# Patient Record
Sex: MALE | Race: WHITE | Employment: FULL TIME | URBAN - METROPOLITAN AREA
[De-identification: names, ages, dates, MRNs, and addresses within clinical notes are randomized per-mention and may not be internally consistent; named-entity substitution may affect disease eponyms.]

---

## 2017-01-18 ENCOUNTER — OFFICE VISIT (OUTPATIENT)
Dept: FAMILY MEDICINE CLINIC | Age: 27
End: 2017-01-18

## 2017-01-18 VITALS
RESPIRATION RATE: 18 BRPM | BODY MASS INDEX: 37.24 KG/M2 | WEIGHT: 281 LBS | TEMPERATURE: 98.1 F | DIASTOLIC BLOOD PRESSURE: 82 MMHG | HEART RATE: 96 BPM | HEIGHT: 73 IN | OXYGEN SATURATION: 98 % | SYSTOLIC BLOOD PRESSURE: 138 MMHG

## 2017-01-18 DIAGNOSIS — T78.40XA ALLERGIC REACTION, INITIAL ENCOUNTER: Primary | ICD-10-CM

## 2017-01-18 PROCEDURE — 99213 OFFICE O/P EST LOW 20 MIN: CPT | Performed by: FAMILY MEDICINE

## 2017-01-18 RX ORDER — METHYLPREDNISOLONE 4 MG/1
TABLET ORAL
COMMUNITY
Start: 2017-01-17 | End: 2019-03-05

## 2017-01-18 RX ORDER — FAMOTIDINE 20 MG/1
TABLET, FILM COATED ORAL
COMMUNITY
Start: 2017-01-17 | End: 2019-03-05

## 2017-01-22 ASSESSMENT — ENCOUNTER SYMPTOMS
GASTROINTESTINAL NEGATIVE: 1
EYES NEGATIVE: 1
SHORTNESS OF BREATH: 0
RESPIRATORY NEGATIVE: 1
ALLERGIC/IMMUNOLOGIC NEGATIVE: 1

## 2019-03-05 ENCOUNTER — OFFICE VISIT (OUTPATIENT)
Dept: INTERNAL MEDICINE CLINIC | Age: 29
End: 2019-03-05
Payer: COMMERCIAL

## 2019-03-05 ENCOUNTER — TELEPHONE (OUTPATIENT)
Dept: INTERNAL MEDICINE CLINIC | Age: 29
End: 2019-03-05

## 2019-03-05 VITALS
HEART RATE: 78 BPM | OXYGEN SATURATION: 99 % | TEMPERATURE: 97.9 F | BODY MASS INDEX: 31.18 KG/M2 | HEIGHT: 74 IN | DIASTOLIC BLOOD PRESSURE: 88 MMHG | WEIGHT: 243 LBS | SYSTOLIC BLOOD PRESSURE: 130 MMHG | RESPIRATION RATE: 16 BRPM

## 2019-03-05 DIAGNOSIS — J02.9 SORE THROAT: ICD-10-CM

## 2019-03-05 DIAGNOSIS — R10.84 GENERALIZED ABDOMINAL PAIN: Primary | ICD-10-CM

## 2019-03-05 DIAGNOSIS — H66.002 ACUTE SUPPURATIVE OTITIS MEDIA OF LEFT EAR WITHOUT SPONTANEOUS RUPTURE OF TYMPANIC MEMBRANE, RECURRENCE NOT SPECIFIED: Primary | ICD-10-CM

## 2019-03-05 DIAGNOSIS — R53.83 FATIGUE, UNSPECIFIED TYPE: ICD-10-CM

## 2019-03-05 LAB
HETEROPHILE ANTIBODIES: NORMAL
S PYO AG THROAT QL: NORMAL

## 2019-03-05 PROCEDURE — 86308 HETEROPHILE ANTIBODY SCREEN: CPT | Performed by: NURSE PRACTITIONER

## 2019-03-05 PROCEDURE — 99213 OFFICE O/P EST LOW 20 MIN: CPT | Performed by: NURSE PRACTITIONER

## 2019-03-05 PROCEDURE — 87880 STREP A ASSAY W/OPTIC: CPT | Performed by: NURSE PRACTITIONER

## 2019-03-05 RX ORDER — CEFDINIR 300 MG/1
300 CAPSULE ORAL 2 TIMES DAILY
Qty: 20 CAPSULE | Refills: 0 | Status: SHIPPED | OUTPATIENT
Start: 2019-03-05 | End: 2019-03-15

## 2019-03-05 RX ORDER — MOXIFLOXACIN HYDROCHLORIDE 400 MG/1
400 TABLET ORAL DAILY
Qty: 10 TABLET | Refills: 0 | Status: SHIPPED | OUTPATIENT
Start: 2019-03-05 | End: 2019-03-15

## 2019-03-05 ASSESSMENT — ENCOUNTER SYMPTOMS
TROUBLE SWALLOWING: 0
EYES NEGATIVE: 1
ABDOMINAL PAIN: 0
COUGH: 0
SINUS PRESSURE: 0
SORE THROAT: 1
SHORTNESS OF BREATH: 0
FACIAL SWELLING: 0
CHANGE IN BOWEL HABIT: 0
SWOLLEN GLANDS: 1
WHEEZING: 0
NAUSEA: 0
VISUAL CHANGE: 0
VOMITING: 0

## 2019-03-05 ASSESSMENT — PATIENT HEALTH QUESTIONNAIRE - PHQ9
SUM OF ALL RESPONSES TO PHQ QUESTIONS 1-9: 2
SUM OF ALL RESPONSES TO PHQ QUESTIONS 1-9: 2
2. FEELING DOWN, DEPRESSED OR HOPELESS: 1
1. LITTLE INTEREST OR PLEASURE IN DOING THINGS: 1
SUM OF ALL RESPONSES TO PHQ9 QUESTIONS 1 & 2: 2

## 2019-03-07 ENCOUNTER — TELEPHONE (OUTPATIENT)
Dept: INTERNAL MEDICINE CLINIC | Age: 29
End: 2019-03-07

## 2019-03-08 ENCOUNTER — TELEPHONE (OUTPATIENT)
Dept: INTERNAL MEDICINE CLINIC | Age: 29
End: 2019-03-08

## 2019-03-08 RX ORDER — CEFDINIR 300 MG/1
300 CAPSULE ORAL 2 TIMES DAILY
Qty: 20 CAPSULE | Refills: 0 | Status: SHIPPED | OUTPATIENT
Start: 2019-03-08 | End: 2019-03-18

## 2019-03-28 ENCOUNTER — OFFICE VISIT (OUTPATIENT)
Dept: INTERNAL MEDICINE CLINIC | Age: 29
End: 2019-03-28
Payer: COMMERCIAL

## 2019-03-28 VITALS
OXYGEN SATURATION: 99 % | BODY MASS INDEX: 30.81 KG/M2 | DIASTOLIC BLOOD PRESSURE: 78 MMHG | SYSTOLIC BLOOD PRESSURE: 128 MMHG | WEIGHT: 240 LBS | RESPIRATION RATE: 16 BRPM | TEMPERATURE: 99 F | HEART RATE: 79 BPM

## 2019-03-28 DIAGNOSIS — K64.9 HEMORRHOIDS, UNSPECIFIED HEMORRHOID TYPE: Primary | ICD-10-CM

## 2019-03-28 DIAGNOSIS — S46.912A STRAIN OF LEFT SHOULDER, INITIAL ENCOUNTER: ICD-10-CM

## 2019-03-28 DIAGNOSIS — G47.33 OSA (OBSTRUCTIVE SLEEP APNEA): ICD-10-CM

## 2019-03-28 PROCEDURE — 99213 OFFICE O/P EST LOW 20 MIN: CPT | Performed by: FAMILY MEDICINE

## 2019-03-28 ASSESSMENT — ENCOUNTER SYMPTOMS
EYES NEGATIVE: 1
ALLERGIC/IMMUNOLOGIC NEGATIVE: 1
SHORTNESS OF BREATH: 0
RESPIRATORY NEGATIVE: 1
GASTROINTESTINAL NEGATIVE: 1

## 2019-05-16 ENCOUNTER — OFFICE VISIT (OUTPATIENT)
Dept: GASTROENTEROLOGY | Age: 29
End: 2019-05-16
Payer: COMMERCIAL

## 2019-05-16 VITALS
HEIGHT: 74 IN | DIASTOLIC BLOOD PRESSURE: 70 MMHG | TEMPERATURE: 97.6 F | OXYGEN SATURATION: 98 % | WEIGHT: 246 LBS | SYSTOLIC BLOOD PRESSURE: 122 MMHG | HEART RATE: 84 BPM | BODY MASS INDEX: 31.57 KG/M2

## 2019-05-16 DIAGNOSIS — K59.1 FUNCTIONAL DIARRHEA: ICD-10-CM

## 2019-05-16 DIAGNOSIS — K92.9 FUNCTIONAL GASTROINTESTINAL DISTURBANCE: Primary | ICD-10-CM

## 2019-05-16 DIAGNOSIS — K64.8 OTHER HEMORRHOIDS: ICD-10-CM

## 2019-05-16 PROCEDURE — 99203 OFFICE O/P NEW LOW 30 MIN: CPT | Performed by: INTERNAL MEDICINE

## 2019-05-16 NOTE — PROGRESS NOTES
Gastroenterology Clinic Visit    Radha Shannon  [id number]  Chief Complaint   Patient presents with    Consultation     HPI: 34 y.o. male presents to the clinic with symptoms of abdominal discomfort, diarrhea mostly after eating at restaurants and food delivery. Patient has made significant changes to his diet which has improved his symptoms. Patient was seen by primary care physician with symptoms consistent with hemorrhoids, was prescribed Anusol with hydrocortisone which has improved his symptoms significantly. Patient denies any constipation. Patient also has been using probiotics which he started after he was treated with antibiotics for ear infection, he has noticed significant improvement in his symptoms after starting probiotics  Patient did have erratic food intake especially at college, patient finished his studies approximately a year ago and is working with a company dealing with medical supplies currently. Between the episodes when patient eating at home he does not have any significant abdominal pain or diarrhea. He does report to excessive stress and anxiety. He denies any depression or sleep issues  He quit smoking 5 years ago. He drinks on occasional basis. Review of Systems   All other systems reviewed and are negative. History reviewed. No pertinent past medical history. Past Surgical History:   Procedure Laterality Date    TONSILLECTOMY AND ADENOIDECTOMY       Current Outpatient Medications on File Prior to Visit   Medication Sig Dispense Refill    Probiotic Product (PROBIOTIC DAILY PO) Take 300 mg by mouth      hydrocortisone (ANUSOL-HC) 2.5 % rectal cream Place rectally 2 times daily. 1 Tube 3     No current facility-administered medications on file prior to visit.       Family History   Problem Relation Age of Onset    High Blood Pressure Father     Cancer Other         GM      Social History     Socioeconomic History    Marital status: Single     Spouse name: None  Number of children: None    Years of education: None    Highest education level: None   Occupational History    None   Social Needs    Financial resource strain: None    Food insecurity:     Worry: None     Inability: None    Transportation needs:     Medical: None     Non-medical: None   Tobacco Use    Smoking status: Never Smoker    Smokeless tobacco: Never Used   Substance and Sexual Activity    Alcohol use: No    Drug use: No    Sexual activity: None   Lifestyle    Physical activity:     Days per week: None     Minutes per session: None    Stress: None   Relationships    Social connections:     Talks on phone: None     Gets together: None     Attends Bahai service: None     Active member of club or organization: None     Attends meetings of clubs or organizations: None     Relationship status: None    Intimate partner violence:     Fear of current or ex partner: None     Emotionally abused: None     Physically abused: None     Forced sexual activity: None   Other Topics Concern    None   Social History Narrative    None       Blood pressure 122/70, pulse 84, temperature 97.6 °F (36.4 °C), height 6' 2\" (1.88 m), weight 246 lb (111.6 kg), SpO2 98 %. Physical Exam   Constitutional: He is oriented to person, place, and time. He appears well-developed and well-nourished. No distress. Eyes: No scleral icterus. Cardiovascular: Normal rate and regular rhythm. Pulmonary/Chest: Effort normal and breath sounds normal.   Abdominal: Soft. Normal appearance and bowel sounds are normal. He exhibits no distension, no ascites and no mass. There is no hepatosplenomegaly. There is no tenderness. There is no rebound, no guarding and no CVA tenderness. Musculoskeletal: Normal range of motion. Lymphadenopathy:     He has no cervical adenopathy. Neurological: He is alert and oriented to person, place, and time. Psychiatric: He has a normal mood and affect.  His behavior is normal. Judgment and thought content normal.     Laboratory, Pathology, Radiology reviewed indetail with relevant important investigations summarized below:  Lab Results   Component Value Date    WBC 7.9 06/07/2016    HGB 16.0 06/07/2016    HCT 46.5 06/07/2016    MCV 85.2 06/07/2016     06/07/2016     Lab Results   Component Value Date    ALT 24 06/07/2016    AST 19 06/07/2016    ALKPHOS 102 06/07/2016    BILITOT 0.5 06/07/2016     Endoscopic investigations: None    Assessmentand Plan:  34 y.o. male with clinical history consistent with functional gastrointestinal disturbance. Hemorrhoids responding well to Anusol with hydrocortisone  - Lifestyle modification with stress reduction, relaxation techniques discussed in detail.  - Dietary changes discussed  - Fiber supplementation, issue with fiber increasing gas and bloating discussed  - Hemorrhoid care discussed    Return if symptoms worsen or fail to improve. Lm Sadler MD   Staff Gastroenterologist  [facility]    Please note this report has been partially produced using speech recognition software and may cause contain errors related to thatsystem including grammar, punctuation and spelling as well as words and phrases that may seem inappropriate. If there are questions or concerns please feel free to contact me to clarify.

## 2019-05-30 ENCOUNTER — OFFICE VISIT (OUTPATIENT)
Dept: FAMILY MEDICINE CLINIC | Age: 29
End: 2019-05-30
Payer: COMMERCIAL

## 2019-05-30 VITALS
DIASTOLIC BLOOD PRESSURE: 80 MMHG | TEMPERATURE: 98.2 F | BODY MASS INDEX: 31.13 KG/M2 | RESPIRATION RATE: 16 BRPM | WEIGHT: 242.6 LBS | HEIGHT: 74 IN | HEART RATE: 60 BPM | SYSTOLIC BLOOD PRESSURE: 128 MMHG | OXYGEN SATURATION: 98 %

## 2019-05-30 DIAGNOSIS — R07.0 THROAT PAIN IN ADULT: ICD-10-CM

## 2019-05-30 DIAGNOSIS — Z11.59 NEED FOR HEPATITIS B SCREENING TEST: ICD-10-CM

## 2019-05-30 DIAGNOSIS — Z88.9 MULTIPLE ALLERGIES: Primary | ICD-10-CM

## 2019-05-30 DIAGNOSIS — Z11.4 SCREENING FOR HIV (HUMAN IMMUNODEFICIENCY VIRUS): ICD-10-CM

## 2019-05-30 LAB
HBV SURFACE AB TITR SER: REACTIVE MIU/ML
S PYO AG THROAT QL: NORMAL

## 2019-05-30 PROCEDURE — 99213 OFFICE O/P EST LOW 20 MIN: CPT | Performed by: PHYSICIAN ASSISTANT

## 2019-05-30 PROCEDURE — 87880 STREP A ASSAY W/OPTIC: CPT | Performed by: PHYSICIAN ASSISTANT

## 2019-05-30 RX ORDER — FLUTICASONE PROPIONATE 50 MCG
1 SPRAY, SUSPENSION (ML) NASAL DAILY
Qty: 1 BOTTLE | Refills: 0 | Status: SHIPPED | OUTPATIENT
Start: 2019-05-30 | End: 2020-09-28

## 2019-05-30 RX ORDER — AZITHROMYCIN 250 MG/1
250 TABLET, FILM COATED ORAL SEE ADMIN INSTRUCTIONS
Qty: 6 TABLET | Refills: 0 | Status: SHIPPED | OUTPATIENT
Start: 2019-05-30 | End: 2019-06-04

## 2019-05-30 ASSESSMENT — ENCOUNTER SYMPTOMS
SINUS PRESSURE: 1
BACK PAIN: 1
NAUSEA: 1
DIARRHEA: 1
CONSTIPATION: 0
EYE PAIN: 0
VOMITING: 0
SINUS PAIN: 1
WHEEZING: 0
SHORTNESS OF BREATH: 0
COUGH: 0
SORE THROAT: 0

## 2019-05-30 NOTE — PROGRESS NOTES
Subjective  Sri Kory, 34 y.o. male presents today with:  Chief Complaint   Patient presents with    Sinus Problem     Patient c/o sinus pain, runny nose, spots in the back of his throat nausea and fatigue x2 days. 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 Denies fevers, chills, cough  Has history of environmental and food allergies would like to see an allergist  His work requires proof of hep b immunity - only able to get two vaccines to to local reaction  Review of Systems   Constitutional: Positive for fatigue. HENT: Positive for sinus pressure and sinus pain. Negative for ear discharge and sore throat. Eyes: Negative for pain. Respiratory: Negative for cough, shortness of breath and wheezing. Gastrointestinal: Positive for diarrhea and nausea. Negative for constipation and vomiting. Genitourinary: Negative for dysuria. Musculoskeletal: Positive for back pain. Negative for neck pain. Skin: Negative for rash. Allergic/Immunologic: Negative for environmental allergies and food allergies. Neurological: Positive for headaches. Negative for dizziness. Psychiatric/Behavioral: Negative for sleep disturbance. History reviewed. No pertinent past medical history.   Past Surgical History:   Procedure Laterality Date    TONSILLECTOMY AND ADENOIDECTOMY       Social History     Socioeconomic History    Marital status: Single     Spouse name: Not on file    Number of children: Not on file    Years of education: Not on file    Highest education level: Not on file   Occupational History    Not on file   Social Needs    Financial resource strain: Not on file    Food insecurity:     Worry: Not on file     Inability: Not on file    Transportation needs:     Medical: Not on file     Non-medical: Not on file   Tobacco Use    Smoking status: Never Smoker    Smokeless tobacco: Never Used   Substance and Sexual Activity    Alcohol use: No    Drug use: No    Sexual activity: Not on file   Lifestyle    Physical activity:     Days per week: Not on file     Minutes per session: Not on file    Stress: Not on file   Relationships    Social connections:     Talks on phone: Not on file     Gets together: Not on file     Attends Buddhist service: Not on file     Active member of club or organization: Not on file     Attends meetings of clubs or organizations: Not on file     Relationship status: Not on file    Intimate partner violence:     Fear of current or ex partner: Not on file     Emotionally abused: Not on file     Physically abused: Not on file     Forced sexual activity: Not on file   Other Topics Concern    Not on file   Social History Narrative    Not on file     Family History   Problem Relation Age of Onset    High Blood Pressure Father     Cancer Other         GM        Allergies   Allergen Reactions    Amoxicillin Itching     pt finished 10 day course of Amoxicillin and proceeded to have itching to bilateral hands, arms, neck, and top of head without any visible rash    Tessalon [Benzonatate] Itching     Current Outpatient Medications   Medication Sig Dispense Refill    azithromycin (ZITHROMAX) 250 MG tablet Take 1 tablet by mouth See Admin Instructions for 5 days 500mg on day 1 followed by 250mg on days 2 - 5 6 tablet 0    Probiotic Product (PROBIOTIC DAILY PO) Take 300 mg by mouth      hydrocortisone (ANUSOL-HC) 2.5 % rectal cream Place rectally 2 times daily. 1 Tube 3     No current facility-administered medications for this visit.         Objective    Vitals:    05/30/19 1315   BP: 128/80   Site: Right Upper Arm   Position: Sitting   Cuff Size: Large Adult   Pulse: 60   Resp: 16   Temp: 98.2 °F (36.8 °C)   TempSrc: Oral   SpO2: 98%   Weight: 242 lb 9.6 oz (110 kg)   Height: 6' 2\" (1.88 m)     Physical Exam   Constitutional: He is oriented to person, place, and time. He appears well-developed and well-nourished. HENT:   Head: Normocephalic and atraumatic. Right Ear: A middle ear effusion is present. Left Ear: A middle ear effusion is present. Mouth/Throat: Oropharyngeal exudate present. Eyes: Pupils are equal, round, and reactive to light. Conjunctivae and EOM are normal.   Neck: Normal range of motion. Neck supple. Cardiovascular: Normal rate, regular rhythm and normal heart sounds. Pulmonary/Chest: Effort normal and breath sounds normal.   Lymphadenopathy:     He has no cervical adenopathy. Neurological: He is alert and oriented to person, place, and time. Skin: Skin is warm and dry. Psychiatric: His affect is blunt. Assessment & Plan    Diagnosis Orders   1. Multiple allergies  Amb External Referral To Allergy   2. Throat pain in adult  POCT rapid strep A    Throat Culture   3. Need for hepatitis B screening test     4. Screening for HIV (human immunodeficiency virus)           Orders Placed This Encounter   Procedures    Throat Culture     Standing Status:   Future     Standing Expiration Date:   5/30/2020    HIV-1,2 Combo Ag/Ab By TAMEKA, Reflexive Panel    Hepatitis B Surface Antibody    Amb External Referral To Allergy     Referral Priority:   Routine     Referral Type:   Consult for Advice and Opinion     Referral Reason:   Specialty Services Required     Requested Specialty:   Allergy     Number of Visits Requested:   1    POCT rapid strep A     Orders Placed This Encounter   Medications    azithromycin (ZITHROMAX) 250 MG tablet     Sig: Take 1 tablet by mouth See Admin Instructions for 5 days 500mg on day 1 followed by 250mg on days 2 - 5     Dispense:  6 tablet     Refill:  0     There are no discontinued medications. Return if symptoms worsen or fail to improve.     Latonia Chavez PA-C

## 2019-06-01 LAB
HIV 1,2 COMBO ANTIGEN/ANTIBODY: NEGATIVE
THROAT CULTURE: NORMAL

## 2020-01-16 LAB
ANION GAP SERPL CALCULATED.3IONS-SCNC: 12 MMOL/L (ref 10–20)
BICARBONATE: 30 MMOL/L (ref 21–32)
CHLORIDE BLD-SCNC: 103 MMOL/L (ref 98–107)
CREAT SERPL-MCNC: 1.06 MG/DL (ref 0.5–1.3)
GFR AFRICAN AMERICAN: >60 ML/MIN/1.73M2
GFR NON-AFRICAN AMERICAN: >60 ML/MIN/1.73M2
MAGNESIUM: 2.1 MG/DL (ref 1.6–2.4)
POTASSIUM SERPL-SCNC: 4.2 MMOL/L (ref 3.5–5.3)
SODIUM BLD-SCNC: 141 MMOL/L (ref 136–145)
T4 FREE: 0.9 NG/DL (ref 0.61–1.1)
TSH SERPL DL<=0.05 MIU/L-ACNC: 2.54 MIU/L (ref 0.44–3.9)
UREA NITROGEN: 13 MG/DL (ref 6–23)

## 2020-08-10 ENCOUNTER — APPOINTMENT (OUTPATIENT)
Dept: GENERAL RADIOLOGY | Age: 30
End: 2020-08-10
Payer: COMMERCIAL

## 2020-08-10 ENCOUNTER — HOSPITAL ENCOUNTER (EMERGENCY)
Age: 30
Discharge: HOME | End: 2020-08-10
Attending: EMERGENCY MEDICINE
Payer: COMMERCIAL

## 2020-08-10 VITALS
HEART RATE: 112 BPM | HEIGHT: 75 IN | TEMPERATURE: 99.7 F | BODY MASS INDEX: 32.33 KG/M2 | WEIGHT: 260 LBS | RESPIRATION RATE: 14 BRPM | SYSTOLIC BLOOD PRESSURE: 149 MMHG | OXYGEN SATURATION: 97 % | DIASTOLIC BLOOD PRESSURE: 94 MMHG

## 2020-08-10 PROCEDURE — 99284 EMERGENCY DEPT VISIT MOD MDM: CPT

## 2020-08-10 PROCEDURE — 73562 X-RAY EXAM OF KNEE 3: CPT

## 2020-08-10 ASSESSMENT — PAIN SCALES - GENERAL: PAINLEVEL_OUTOF10: 3

## 2020-08-10 ASSESSMENT — PAIN DESCRIPTION - LOCATION: LOCATION: KNEE

## 2020-08-10 ASSESSMENT — PAIN DESCRIPTION - ORIENTATION: ORIENTATION: RIGHT

## 2020-08-10 ASSESSMENT — PAIN DESCRIPTION - PAIN TYPE: TYPE: ACUTE PAIN

## 2020-08-10 ASSESSMENT — PAIN DESCRIPTION - DESCRIPTORS: DESCRIPTORS: ACHING

## 2020-08-10 ASSESSMENT — PAIN DESCRIPTION - FREQUENCY: FREQUENCY: CONTINUOUS

## 2020-08-11 NOTE — ED TRIAGE NOTES
Pt c/o right knee pain s/p playing softball. Pt states it hurts to bear weight on it. States he head a pop/crack. Pt alert and oriented x 4. Skin pink, warm, dry. Respirations even and unlabored. No distress noted at this time.

## 2020-08-11 NOTE — ED NOTES
Knee immobilizer applied to right knee, instructed patient on use. MSPs intact. RICE encouraged at home.      Tevin Avila RN  08/10/20 0948

## 2020-08-11 NOTE — ED PROVIDER NOTES
[facility] ED  EMERGENCY DEPARTMENT ENCOUNTER      Pt Name: Golden Noe  MRN: [id number]  [place] 1990  Date of evaluation: 8/10/2020  Provider: Lee Downing MD    [address] [place] [address]       Chief Complaint   Patient presents with    Knee Pain         HISTORY OF PRESENT ILLNESS   (Location/Symptom, Timing/Onset, Context/Setting, Quality, Duration, Modifying Factors, Severity)  Note limiting factors. 79-year-old male presenting with right knee pain. Patient was playing softball when he fell onto his knee. Noted a pop and was unable to walk afterwards. Has not taken anything for pain. Pain is localized to area just below the kneecap. No numbness or tingling. Nursing Notes were reviewed. REVIEW OF SYSTEMS    (2-9 systems for level 4, 10 or more for level 5)     Review of Systems   All other systems reviewed and are negative. Except as noted above the remainder of the review of systems was reviewed and negative. PAST MEDICAL HISTORY   No past medical history on file. SURGICAL HISTORY       Past Surgical History:   Procedure Laterality Date    TONSILLECTOMY AND ADENOIDECTOMY           CURRENT MEDICATIONS       Current Discharge Medication List      CONTINUE these medications which have NOT CHANGED    Details   fluticasone (FLONASE) 50 MCG/ACT nasal spray 1 spray by Each Nostril route daily  Qty: 1 Bottle, Refills: 0      Probiotic Product (PROBIOTIC DAILY PO) Take 300 mg by mouth      hydrocortisone (ANUSOL-HC) 2.5 % rectal cream Place rectally 2 times daily.   Qty: 1 Tube, Refills: 3             ALLERGIES     Amoxicillin and Tessalon [benzonatate]    FAMILY HISTORY       Family History   Problem Relation Age of Onset    High Blood Pressure Father     Cancer Other         GM          SOCIAL HISTORY       Social History     Socioeconomic History    Marital status: Single     Spouse name: Not on file    Number of children: Not on file    Years of education: Not on file  Highest education level: Not on file   Occupational History    Not on file   Social Needs    Financial resource strain: Not on file    Food insecurity     Worry: Not on file     Inability: Not on file    Transportation needs     Medical: Not on file     Non-medical: Not on file   Tobacco Use    Smoking status: Never Smoker    Smokeless tobacco: Never Used   Substance and Sexual Activity    Alcohol use: No    Drug use: No    Sexual activity: Not on file   Lifestyle    Physical activity     Days per week: Not on file     Minutes per session: Not on file    Stress: Not on file   Relationships    Social connections     Talks on phone: Not on file     Gets together: Not on file     Attends Sabianist service: Not on file     Active member of club or organization: Not on file     Attends meetings of clubs or organizations: Not on file     Relationship status: Not on file    Intimate partner violence     Fear of current or ex partner: Not on file     Emotionally abused: Not on file     Physically abused: Not on file     Forced sexual activity: Not on file   Other Topics Concern    Not on file   Social History Narrative    Not on file       SCREENINGS               PHYSICAL EXAM    (up to 7 for level 4, 8 or more for level 5)     ED Triage Vitals   BP Temp Temp Source Pulse Resp SpO2 Height Weight   08/10/20 2141 08/10/20 2140 08/10/20 2140 08/10/20 2141 08/10/20 2141 08/10/20 2140 08/10/20 2140 08/10/20 2140   (!) 149/94 99.7 °F (37.6 °C) Oral 112 14 97 % 6' 3\" (1.905 m) 260 lb (117.9 kg)       Physical Exam  Vitals signs and nursing note reviewed. Constitutional:       General: He is not in acute distress. Appearance: Normal appearance. He is well-developed. HENT:      Head: Normocephalic and atraumatic. Eyes:      Conjunctiva/sclera: Conjunctivae normal.   Neck:      Musculoskeletal: Normal range of motion and neck supple. Cardiovascular:      Rate and Rhythm: Normal rate and regular rhythm. softball game. X-ray shows no fracture. Suspect possible ligamentous injury. Given knee immobilizer and crutches. Recommend supportive care with ice, Tylenol and Motrin. Given orthopedic follow-up. Patient will be discharged home in good condition. Patient has been hemodynamically stable throughout ED course and is appropriate for outpatient follow up. Patient should follow up with ortho in 2-3 days or return to ED immediately for any new or worsening symptoms, non weight bearing until seen by ortho. Patient is well appearing on discharge and agreeable with plan of care. Patient Progress  Patient progress: stable      Procedures    CRITICAL CARE TIME   Total Critical Care time was 0 minutes, excluding separately reportable procedures. There was a high probability of clinically significant/life threatening deterioration in the patient's condition which required my urgent intervention. FINAL IMPRESSION      1.  Acute pain of right knee          DISPOSITION/PLAN   DISPOSITION Decision To Discharge 08/10/2020 10:44:45 PM      (Please note that portions of this note were completed with a voice recognition program.  Efforts were made to edit the dictations but occasionally words are mis-transcribed.)    Garcia Suárez MD (electronically signed)  Attending Emergency Physician        Garcia Suárez MD  08/10/20 7534

## 2020-08-25 ENCOUNTER — HOSPITAL ENCOUNTER (OUTPATIENT)
Dept: MRI IMAGING | Age: 30
Discharge: HOME | End: 2020-08-27
Payer: COMMERCIAL

## 2020-08-25 PROCEDURE — 73721 MRI JNT OF LWR EXTRE W/O DYE: CPT

## 2020-09-28 ENCOUNTER — HOSPITAL ENCOUNTER (OUTPATIENT)
Dept: PREADMISSION TESTING | Age: 30
Discharge: HOME | End: 2020-10-02
Payer: COMMERCIAL

## 2020-09-28 ENCOUNTER — NURSE ONLY (OUTPATIENT)
Dept: PRIMARY CARE CLINIC | Age: 30
End: 2020-09-28

## 2020-09-28 VITALS
RESPIRATION RATE: 16 BRPM | DIASTOLIC BLOOD PRESSURE: 76 MMHG | BODY MASS INDEX: 33.29 KG/M2 | WEIGHT: 259.4 LBS | HEART RATE: 77 BPM | TEMPERATURE: 98.4 F | SYSTOLIC BLOOD PRESSURE: 142 MMHG | OXYGEN SATURATION: 98 % | HEIGHT: 74 IN

## 2020-09-28 PROCEDURE — U0003 INFECTIOUS AGENT DETECTION BY NUCLEIC ACID (DNA OR RNA); SEVERE ACUTE RESPIRATORY SYNDROME CORONAVIRUS 2 (SARS-COV-2) (CORONAVIRUS DISEASE [COVID-19]), AMPLIFIED PROBE TECHNIQUE, MAKING USE OF HIGH THROUGHPUT TECHNOLOGIES AS DESCRIBED BY CMS-2020-01-R: HCPCS

## 2020-09-28 RX ORDER — SODIUM CHLORIDE, SODIUM LACTATE, POTASSIUM CHLORIDE, CALCIUM CHLORIDE 600; 310; 30; 20 MG/100ML; MG/100ML; MG/100ML; MG/100ML
INJECTION, SOLUTION INTRAVENOUS CONTINUOUS
Status: CANCELLED | OUTPATIENT
Start: 2020-10-02

## 2020-09-28 RX ORDER — SODIUM CHLORIDE 0.9 % (FLUSH) 0.9 %
10 SYRINGE (ML) INJECTION PRN
Status: CANCELLED | OUTPATIENT
Start: 2020-10-02

## 2020-09-28 RX ORDER — LIDOCAINE HYDROCHLORIDE 10 MG/ML
1 INJECTION, SOLUTION EPIDURAL; INFILTRATION; INTRACAUDAL; PERINEURAL
Status: CANCELLED | OUTPATIENT
Start: 2020-10-02 | End: 2020-10-02

## 2020-09-28 RX ORDER — SODIUM CHLORIDE 0.9 % (FLUSH) 0.9 %
10 SYRINGE (ML) INJECTION EVERY 12 HOURS SCHEDULED
Status: CANCELLED | OUTPATIENT
Start: 2020-10-02

## 2020-10-01 NOTE — H&P
[address]                      [facility], [address] [facility]                              HISTORY AND PHYSICAL    PATIENT NAME: Barbara Aguilar                     :        1990  MED REC NO:   [id number]                            ROOM:  ACCOUNT NO:   [de-identified]                           ADMIT DATE: 10/02/2020  PROVIDER:     Qasim Bustillos PA-C    FAMILY PROVIDER:  Dr. Nat Holly. CHIEF COMPLAINT:  Right knee pain and instability. HISTORY OF PRESENT ILLNESS:  The patient injured his right knee playing  softball, had immediate pain and swelling. Diagnostic studies show ACL  tendon tearing as well as medial meniscal tear. After risks, benefits,  and alternatives were discussed, the patient elects to proceed with  right knee arthroscopy ACL reconstruction. This will be performed on  10/02/2020 at [facility] in [facility]. PAST MEDICAL HISTORY:  Noncontributory. PAST SURGICAL HISTORY:  Significant for ear tubes as infant and wisdom  teeth extraction. He had no issues. ALLERGIES:  THE PATIENT DOES REPORT ALLERGY TO PENICILLIN, HAD  ANAPHYLAXIS. HE ALSO HAS A POULTRY ALLERGY. SOCIAL HISTORY:  The patient denies any substance abuse. Reports one or  two alcoholic beverages per month. Has a previous history of smoking,  but has quit. FAMILY HISTORY:  Noncontributory. REVIEW OF SYSTEMS:  Noncontributory. PHYSICAL EXAMINATION:  GENERAL:  A 51-year-old  male. Height 6 feet 3 inches, weight  260 pounds. EYES:  Pupils equal, round, react to light and accommodation. Extraocular eye movements are intact. He wears corrective lenses. CHEST:  Lungs are clear to auscultation bilaterally. CARDIAC:  Regular rate and rhythm. No murmurs, rubs, or gallops  appreciated. ABDOMEN:  Soft, nontender. Normoactive bowel sounds x4 quadrants. EXTREMITIES:  Right knee:  Positive effusion. Positive George. Positive drawer sign. Current range of motion is from 0 to 140 degrees. NEUROLOGIC:  CN II through XII grossly intact. ASSESSMENT:  Right knee ACL tear and medial meniscal tear. PLAN:  Right knee arthroscopy ACL reconstruction, possible medial  meniscectomy versus meniscal repair. Surgery will be on 10/02/2020 at  [facility] in [facility].         Rory Ortiz    D: 10/01/2020 10:37:59       T: 10/01/2020 10:46:16     [email or web address]  Job#: [id number]     Doc#: [id number]    CC:

## 2020-10-02 ENCOUNTER — ANESTHESIA EVENT (OUTPATIENT)
Dept: OPERATING ROOM | Age: 30
End: 2020-10-02
Payer: COMMERCIAL

## 2020-10-02 ENCOUNTER — ANESTHESIA (OUTPATIENT)
Dept: OPERATING ROOM | Age: 30
End: 2020-10-02
Payer: COMMERCIAL

## 2020-10-02 ENCOUNTER — HOSPITAL ENCOUNTER (OUTPATIENT)
Age: 30
Setting detail: OUTPATIENT SURGERY
Discharge: HOME | End: 2020-10-02
Attending: ORTHOPAEDIC SURGERY | Admitting: ORTHOPAEDIC SURGERY
Payer: COMMERCIAL

## 2020-10-02 VITALS
OXYGEN SATURATION: 97 % | BODY MASS INDEX: 33.24 KG/M2 | RESPIRATION RATE: 16 BRPM | TEMPERATURE: 97.8 F | WEIGHT: 259 LBS | HEART RATE: 78 BPM | SYSTOLIC BLOOD PRESSURE: 140 MMHG | DIASTOLIC BLOOD PRESSURE: 74 MMHG | HEIGHT: 74 IN

## 2020-10-02 VITALS — SYSTOLIC BLOOD PRESSURE: 107 MMHG | DIASTOLIC BLOOD PRESSURE: 54 MMHG | OXYGEN SATURATION: 94 %

## 2020-10-02 PROCEDURE — 7100000001 HC PACU RECOVERY - ADDTL 15 MIN: Performed by: ORTHOPAEDIC SURGERY

## 2020-10-02 PROCEDURE — 2709999900 HC NON-CHARGEABLE SUPPLY: Performed by: ORTHOPAEDIC SURGERY

## 2020-10-02 PROCEDURE — 2580000003 HC RX 258: Performed by: ORTHOPAEDIC SURGERY

## 2020-10-02 PROCEDURE — 6360000002 HC RX W HCPCS: Performed by: ANESTHESIOLOGY

## 2020-10-02 PROCEDURE — 7100000000 HC PACU RECOVERY - FIRST 15 MIN: Performed by: ORTHOPAEDIC SURGERY

## 2020-10-02 PROCEDURE — 7100000011 HC PHASE II RECOVERY - ADDTL 15 MIN: Performed by: ORTHOPAEDIC SURGERY

## 2020-10-02 PROCEDURE — C1776 JOINT DEVICE (IMPLANTABLE): HCPCS | Performed by: ORTHOPAEDIC SURGERY

## 2020-10-02 PROCEDURE — 3700000000 HC ANESTHESIA ATTENDED CARE: Performed by: ORTHOPAEDIC SURGERY

## 2020-10-02 PROCEDURE — 3600000004 HC SURGERY LEVEL 4 BASE: Performed by: ORTHOPAEDIC SURGERY

## 2020-10-02 PROCEDURE — 3600000014 HC SURGERY LEVEL 4 ADDTL 15MIN: Performed by: ORTHOPAEDIC SURGERY

## 2020-10-02 PROCEDURE — 6360000002 HC RX W HCPCS: Performed by: ORTHOPAEDIC SURGERY

## 2020-10-02 PROCEDURE — 7100000010 HC PHASE II RECOVERY - FIRST 15 MIN: Performed by: ORTHOPAEDIC SURGERY

## 2020-10-02 PROCEDURE — 2500000003 HC RX 250 WO HCPCS: Performed by: ORTHOPAEDIC SURGERY

## 2020-10-02 PROCEDURE — C1713 ANCHOR/SCREW BN/BN,TIS/BN: HCPCS | Performed by: ORTHOPAEDIC SURGERY

## 2020-10-02 PROCEDURE — 64447 NJX AA&/STRD FEMORAL NRV IMG: CPT | Performed by: ANESTHESIOLOGY

## 2020-10-02 PROCEDURE — 6370000000 HC RX 637 (ALT 250 FOR IP): Performed by: ANESTHESIOLOGY

## 2020-10-02 PROCEDURE — 2580000003 HC RX 258: Performed by: NURSE PRACTITIONER

## 2020-10-02 PROCEDURE — 2720000010 HC SURG SUPPLY STERILE: Performed by: ORTHOPAEDIC SURGERY

## 2020-10-02 PROCEDURE — 3700000001 HC ADD 15 MINUTES (ANESTHESIA): Performed by: ORTHOPAEDIC SURGERY

## 2020-10-02 DEVICE — SCREW INTFR L30MM DIA10MM VENT BIOCOMPOSITE: Type: IMPLANTABLE DEVICE | Site: KNEE | Status: FUNCTIONAL

## 2020-10-02 DEVICE — PIN FIX L50MM DIA5MM SFT TISS KNEE AMORPHOUS PLLA FOR ACL: Type: IMPLANTABLE DEVICE | Site: KNEE | Status: FUNCTIONAL

## 2020-10-02 RX ORDER — OXYCODONE HYDROCHLORIDE 5 MG/1
5 TABLET ORAL EVERY 4 HOURS PRN
Status: DISCONTINUED | OUTPATIENT
Start: 2020-10-02 | End: 2020-10-02 | Stop reason: HOSPADM

## 2020-10-02 RX ORDER — SODIUM CHLORIDE, SODIUM LACTATE, POTASSIUM CHLORIDE, CALCIUM CHLORIDE 600; 310; 30; 20 MG/100ML; MG/100ML; MG/100ML; MG/100ML
INJECTION, SOLUTION INTRAVENOUS CONTINUOUS
Status: DISCONTINUED | OUTPATIENT
Start: 2020-10-02 | End: 2020-10-02 | Stop reason: HOSPADM

## 2020-10-02 RX ORDER — LIDOCAINE HYDROCHLORIDE 10 MG/ML
1 INJECTION, SOLUTION EPIDURAL; INFILTRATION; INTRACAUDAL; PERINEURAL
Status: COMPLETED | OUTPATIENT
Start: 2020-10-02 | End: 2020-10-02

## 2020-10-02 RX ORDER — METOCLOPRAMIDE HYDROCHLORIDE 5 MG/ML
10 INJECTION INTRAMUSCULAR; INTRAVENOUS
Status: COMPLETED | OUTPATIENT
Start: 2020-10-02 | End: 2020-10-02

## 2020-10-02 RX ORDER — KETOROLAC TROMETHAMINE 30 MG/ML
INJECTION, SOLUTION INTRAMUSCULAR; INTRAVENOUS PRN
Status: DISCONTINUED | OUTPATIENT
Start: 2020-10-02 | End: 2020-10-02 | Stop reason: SDUPTHER

## 2020-10-02 RX ORDER — MORPHINE SULFATE 10 MG/ML
INJECTION, SOLUTION INTRAMUSCULAR; INTRAVENOUS PRN
Status: DISCONTINUED | OUTPATIENT
Start: 2020-10-02 | End: 2020-10-02 | Stop reason: ALTCHOICE

## 2020-10-02 RX ORDER — SODIUM CHLORIDE 0.9 % (FLUSH) 0.9 %
10 SYRINGE (ML) INJECTION PRN
Status: CANCELLED | OUTPATIENT
Start: 2020-10-02

## 2020-10-02 RX ORDER — MAGNESIUM HYDROXIDE 1200 MG/15ML
LIQUID ORAL CONTINUOUS PRN
Status: COMPLETED | OUTPATIENT
Start: 2020-10-02 | End: 2020-10-02

## 2020-10-02 RX ORDER — FENTANYL CITRATE 50 UG/ML
INJECTION, SOLUTION INTRAMUSCULAR; INTRAVENOUS PRN
Status: DISCONTINUED | OUTPATIENT
Start: 2020-10-02 | End: 2020-10-02 | Stop reason: SDUPTHER

## 2020-10-02 RX ORDER — DIPHENHYDRAMINE HYDROCHLORIDE 50 MG/ML
12.5 INJECTION INTRAMUSCULAR; INTRAVENOUS
Status: DISCONTINUED | OUTPATIENT
Start: 2020-10-02 | End: 2020-10-02 | Stop reason: HOSPADM

## 2020-10-02 RX ORDER — DEXAMETHASONE SODIUM PHOSPHATE 10 MG/ML
INJECTION INTRAMUSCULAR; INTRAVENOUS PRN
Status: DISCONTINUED | OUTPATIENT
Start: 2020-10-02 | End: 2020-10-02 | Stop reason: SDUPTHER

## 2020-10-02 RX ORDER — SODIUM CHLORIDE 0.9 % (FLUSH) 0.9 %
10 SYRINGE (ML) INJECTION EVERY 12 HOURS SCHEDULED
Status: CANCELLED | OUTPATIENT
Start: 2020-10-02

## 2020-10-02 RX ORDER — ROPIVACAINE HYDROCHLORIDE 5 MG/ML
INJECTION, SOLUTION EPIDURAL; INFILTRATION; PERINEURAL PRN
Status: DISCONTINUED | OUTPATIENT
Start: 2020-10-02 | End: 2020-10-02 | Stop reason: SDUPTHER

## 2020-10-02 RX ORDER — ONDANSETRON 2 MG/ML
INJECTION INTRAMUSCULAR; INTRAVENOUS PRN
Status: DISCONTINUED | OUTPATIENT
Start: 2020-10-02 | End: 2020-10-02 | Stop reason: SDUPTHER

## 2020-10-02 RX ORDER — ONDANSETRON 2 MG/ML
4 INJECTION INTRAMUSCULAR; INTRAVENOUS
Status: COMPLETED | OUTPATIENT
Start: 2020-10-02 | End: 2020-10-02

## 2020-10-02 RX ORDER — MEPERIDINE HYDROCHLORIDE 25 MG/ML
12.5 INJECTION INTRAMUSCULAR; INTRAVENOUS; SUBCUTANEOUS EVERY 5 MIN PRN
Status: DISCONTINUED | OUTPATIENT
Start: 2020-10-02 | End: 2020-10-02 | Stop reason: HOSPADM

## 2020-10-02 RX ORDER — MORPHINE SULFATE 2 MG/ML
2 INJECTION, SOLUTION INTRAMUSCULAR; INTRAVENOUS
Status: DISCONTINUED | OUTPATIENT
Start: 2020-10-02 | End: 2020-10-02 | Stop reason: HOSPADM

## 2020-10-02 RX ORDER — PROPOFOL 10 MG/ML
INJECTION, EMULSION INTRAVENOUS PRN
Status: DISCONTINUED | OUTPATIENT
Start: 2020-10-02 | End: 2020-10-02 | Stop reason: SDUPTHER

## 2020-10-02 RX ORDER — MIDAZOLAM HYDROCHLORIDE 1 MG/ML
INJECTION INTRAMUSCULAR; INTRAVENOUS PRN
Status: DISCONTINUED | OUTPATIENT
Start: 2020-10-02 | End: 2020-10-02 | Stop reason: SDUPTHER

## 2020-10-02 RX ORDER — HYDROCODONE BITARTRATE AND ACETAMINOPHEN 5; 325 MG/1; MG/1
2 TABLET ORAL PRN
Status: COMPLETED | OUTPATIENT
Start: 2020-10-02 | End: 2020-10-02

## 2020-10-02 RX ORDER — HYDROCODONE BITARTRATE AND ACETAMINOPHEN 5; 325 MG/1; MG/1
1 TABLET ORAL PRN
Status: COMPLETED | OUTPATIENT
Start: 2020-10-02 | End: 2020-10-02

## 2020-10-02 RX ORDER — FENTANYL CITRATE 50 UG/ML
50 INJECTION, SOLUTION INTRAMUSCULAR; INTRAVENOUS EVERY 10 MIN PRN
Status: DISCONTINUED | OUTPATIENT
Start: 2020-10-02 | End: 2020-10-02 | Stop reason: HOSPADM

## 2020-10-02 RX ORDER — SODIUM CHLORIDE 0.9 % (FLUSH) 0.9 %
10 SYRINGE (ML) INJECTION EVERY 12 HOURS SCHEDULED
Status: DISCONTINUED | OUTPATIENT
Start: 2020-10-02 | End: 2020-10-02 | Stop reason: HOSPADM

## 2020-10-02 RX ORDER — MORPHINE SULFATE 4 MG/ML
4 INJECTION, SOLUTION INTRAMUSCULAR; INTRAVENOUS
Status: DISCONTINUED | OUTPATIENT
Start: 2020-10-02 | End: 2020-10-02 | Stop reason: HOSPADM

## 2020-10-02 RX ORDER — BUPIVACAINE HYDROCHLORIDE 2.5 MG/ML
INJECTION, SOLUTION EPIDURAL; INFILTRATION; INTRACAUDAL PRN
Status: DISCONTINUED | OUTPATIENT
Start: 2020-10-02 | End: 2020-10-02 | Stop reason: ALTCHOICE

## 2020-10-02 RX ORDER — SODIUM CHLORIDE 0.9 % (FLUSH) 0.9 %
10 SYRINGE (ML) INJECTION PRN
Status: DISCONTINUED | OUTPATIENT
Start: 2020-10-02 | End: 2020-10-02 | Stop reason: HOSPADM

## 2020-10-02 RX ADMIN — FENTANYL CITRATE 50 MCG: 50 INJECTION, SOLUTION INTRAMUSCULAR; INTRAVENOUS at 07:33

## 2020-10-02 RX ADMIN — PROPOFOL 300 MG: 10 INJECTION, EMULSION INTRAVENOUS at 07:33

## 2020-10-02 RX ADMIN — MIDAZOLAM HYDROCHLORIDE 2 MG: 2 INJECTION, SOLUTION INTRAMUSCULAR; INTRAVENOUS at 07:06

## 2020-10-02 RX ADMIN — KETOROLAC TROMETHAMINE 30 MG: 30 INJECTION, SOLUTION INTRAMUSCULAR; INTRAVENOUS at 08:38

## 2020-10-02 RX ADMIN — METOCLOPRAMIDE HYDROCHLORIDE 10 MG: 5 INJECTION INTRAMUSCULAR; INTRAVENOUS at 09:30

## 2020-10-02 RX ADMIN — ROPIVACAINE HYDROCHLORIDE 30 ML: 5 INJECTION, SOLUTION EPIDURAL; INFILTRATION; PERINEURAL at 07:10

## 2020-10-02 RX ADMIN — LIDOCAINE HYDROCHLORIDE 0.1 ML: 10 INJECTION, SOLUTION EPIDURAL; INFILTRATION; INTRACAUDAL; PERINEURAL at 06:36

## 2020-10-02 RX ADMIN — ONDANSETRON 4 MG: 2 INJECTION INTRAMUSCULAR; INTRAVENOUS at 09:20

## 2020-10-02 RX ADMIN — HYDROCODONE BITARTRATE AND ACETAMINOPHEN 1 TABLET: 5; 325 TABLET ORAL at 10:17

## 2020-10-02 RX ADMIN — ONDANSETRON 4 MG: 2 INJECTION INTRAMUSCULAR; INTRAVENOUS at 07:35

## 2020-10-02 RX ADMIN — FENTANYL CITRATE 100 MCG: 50 INJECTION, SOLUTION INTRAMUSCULAR; INTRAVENOUS at 07:06

## 2020-10-02 RX ADMIN — KETOROLAC TROMETHAMINE 30 MG: 30 INJECTION, SOLUTION INTRAMUSCULAR; INTRAVENOUS at 08:30

## 2020-10-02 RX ADMIN — VANCOMYCIN HYDROCHLORIDE 1000 MG: 1 INJECTION, POWDER, LYOPHILIZED, FOR SOLUTION INTRAVENOUS at 06:41

## 2020-10-02 RX ADMIN — DEXAMETHASONE SODIUM PHOSPHATE 10 MG: 10 INJECTION INTRAMUSCULAR; INTRAVENOUS at 07:38

## 2020-10-02 RX ADMIN — SODIUM CHLORIDE, POTASSIUM CHLORIDE, SODIUM LACTATE AND CALCIUM CHLORIDE 1000 ML: 600; 310; 30; 20 INJECTION, SOLUTION INTRAVENOUS at 06:37

## 2020-10-02 ASSESSMENT — PULMONARY FUNCTION TESTS
PIF_VALUE: 14
PIF_VALUE: 14
PIF_VALUE: 13
PIF_VALUE: 14
PIF_VALUE: 13
PIF_VALUE: 14
PIF_VALUE: 11
PIF_VALUE: 13
PIF_VALUE: 13
PIF_VALUE: 1
PIF_VALUE: 12
PIF_VALUE: 13
PIF_VALUE: 8
PIF_VALUE: 13
PIF_VALUE: 11
PIF_VALUE: 8
PIF_VALUE: 11
PIF_VALUE: 13
PIF_VALUE: 11
PIF_VALUE: 11
PIF_VALUE: 13
PIF_VALUE: 13
PIF_VALUE: 2
PIF_VALUE: 13
PIF_VALUE: 13
PIF_VALUE: 2
PIF_VALUE: 13
PIF_VALUE: 12
PIF_VALUE: 8
PIF_VALUE: 13
PIF_VALUE: 8
PIF_VALUE: 13
PIF_VALUE: 14
PIF_VALUE: 13
PIF_VALUE: 13
PIF_VALUE: 11
PIF_VALUE: 4
PIF_VALUE: 11
PIF_VALUE: 12
PIF_VALUE: 13
PIF_VALUE: 21
PIF_VALUE: 13
PIF_VALUE: 13
PIF_VALUE: 11
PIF_VALUE: 13
PIF_VALUE: 11
PIF_VALUE: 14
PIF_VALUE: 11
PIF_VALUE: 13
PIF_VALUE: 14
PIF_VALUE: 7
PIF_VALUE: 13
PIF_VALUE: 12
PIF_VALUE: 13
PIF_VALUE: 12
PIF_VALUE: 13
PIF_VALUE: 0
PIF_VALUE: 13
PIF_VALUE: 13
PIF_VALUE: 14
PIF_VALUE: 12
PIF_VALUE: 12
PIF_VALUE: 11
PIF_VALUE: 1
PIF_VALUE: 13
PIF_VALUE: 13
PIF_VALUE: 8
PIF_VALUE: 13
PIF_VALUE: 8
PIF_VALUE: 1
PIF_VALUE: 19
PIF_VALUE: 13
PIF_VALUE: 12
PIF_VALUE: 13
PIF_VALUE: 13
PIF_VALUE: 14
PIF_VALUE: 11
PIF_VALUE: 11
PIF_VALUE: 13
PIF_VALUE: 12
PIF_VALUE: 8

## 2020-10-02 ASSESSMENT — PAIN SCALES - GENERAL
PAINLEVEL_OUTOF10: 2
PAINLEVEL_OUTOF10: 4
PAINLEVEL_OUTOF10: 5
PAINLEVEL_OUTOF10: 4

## 2020-10-02 ASSESSMENT — PAIN DESCRIPTION - DESCRIPTORS: DESCRIPTORS: ACHING

## 2020-10-02 ASSESSMENT — PAIN DESCRIPTION - LOCATION: LOCATION: KNEE

## 2020-10-02 ASSESSMENT — PAIN DESCRIPTION - PAIN TYPE: TYPE: SURGICAL PAIN

## 2020-10-02 NOTE — OP NOTE
[address]                      [address], [address] [facility]                                OPERATIVE REPORT    PATIENT NAME: Chelsi Condon                     :        1990  MED REC NO:   [id number]                            ROOM:  ACCOUNT NO:   [de-identified]                           ADMIT DATE: 10/02/2020  PROVIDER:     Anatoly Martinez MD    DATE OF PROCEDURE:  10/02/2020    PREOPERATIVE DIAGNOSES:  Right ACL tear, medial meniscus tear. POSTOPERATIVE DIAGNOSES:  Right ACL tear, medial meniscus tear, lateral  meniscus tear. OPERATIONS PERFORMED:  1. Arthroscopic partial, medial, and lateral meniscectomy. 2.  Arthroscopic ACL reconstruction with autogenous hamstring tendons. 3.  Harvesting of right knee autogenous hamstring tendons. SURGEON:  Anatoly Martinez MD    ASSISTANT:  Adrian Schultz PA-C was present throughout the entire case. Given the nature of the disease process and the procedure, a skilled  surgical first assistant was necessary during the case. The assistant  was necessary to hold retractors and manipulate the extremity during the  procedure. A certified scrub tech was at the back table managing the  instruments and supplies for the surgical case. ESTIMATED BLOOD LOSS:  Minimal.    FLUIDS:  Less than 3 L. ANESTHESIA:  General with a regional block. COMPLICATIONS:  None. INDICATIONS:  A 81-year-old male with knee pain, ACL tear, meniscus  tear, now for arthroscopy. SUMMATION OF EVENTS:  The patient was brought to the operating room. After induction of anesthesia, routine prep and drape. At this time,  exam with anesthesia showed a 3 to 4+ Lachman and Pivot shift. The  patient had stability of the collaterals. There was no rotatory  instability. There was no posterior dial test.  It was negative at 0,  30, and 60 degrees. There was no posterior instability.     Standard routine prep and drape, tourniquet was placed, but not inflated  and we harvested the hamstring tendons. The skin incision was made of 2  to 3 cm just medial to tibial tubercle. We identified the pes anserinus  and harvested bluntly the gracilis and semitendinosus tendons with a  tendon stripper bluntly. Hemostasis was obtained. The tendons were  taken to the back table, cleaned, and folded into a quadruple strand  graft that did nicely to a 9 mm tunnel. At this time, we began the arthroscopic portion of the case. Esmarch exsanguination was employed. Tourniquet was inflated. Portals  were established around the patellar tendon and we sequentially  inspected the knee. Patellofemoral joint was clean. Medial and lateral gutters were empty. Medial joint space and chondral surface was intact. There was a complex  tear of the posterior horn and medial meniscus as explained by the MRI  and partial meniscectomy comprising around 50% of the posterior horn of  the medial meniscus was carried out. Transition zones were created and  the majority of the meniscus was left intact. Just the posterior horn  was partially resected. The chondral surfaces were intact. In the ACL notch position, the ACL was completely devoured of its  attachment. There was a large wadded up piece of ACL or cyclops lesion  that was debrided and cleaned. The over the top position was inspected  and a small notchplasty was performed. In the figure-of-four position, there were similarly complex tear of the  posterior horn of the lateral meniscus just involving the inner rim  about 20% of the meniscus in the white zone, so a partial meniscectomy  was out at the posterior horn of lateral meniscus with transition zone  to the lateral size of the meniscus. The anterior portion and direct  lateral meniscus was left intact. There was good circumferential  meniscus tissue. The chondral surfaces were intact.   At this time, we  began the ACL reconstruction portion of the case.    Through the same skin incision, but a separate fascial plain, a guide  pin was inserted into the old ACL footprint from the tibial side. We  opened the tunnel to 9 mm and smoothened and cleaned it. Using a 7 mm over the top position, the graft was placed at around the  10 o'clock position on the right knee. A guide pin was resected. Tunnel was drilled 30 mm in length and 9 mm in diameter. Using the Arthrex outrigger system, a guide pin was then brought from  the lateral to medial across the condylar access of the femur through  the outrigger system and out the medial side of the knee. The lateral cortex was opened. A flexible wire was exchanged and the  wire was pulled through the knee joint out the tibial tunnel. The graft was placed over the flexible wire and the graft was easily  recessed to roughly 25 to 30 mm on the femoral side. Using the Arthrex Bio-cross pin, the pin was inserted from lateral to  medial locking the graft into place securing it under direct  observation. We then tensioned the graft in situ taking it through 30  ranges of motions at 35 pounds of pressure and at 35 degrees. We  inserted a 10 mm Bio-Composite Arthrex tibial screw with excellent  purchase. This allowed for photo-documentation. A hands-free showed  that there was negative Lachman and Pivoted full extension. Lavage  irrigation was completed. Photo-documentation of the graft was  completed. All wounds were closed with 2-0 and 4-0 Monocryl. Compressive dressing  was applied and Marcaine, Duramorph was inserted into the portal site  and tibial tunnel for comfort. The patient was transferred to recovery  room in a knee immobilizer.         Johnson Manrique MD    D: 10/02/2020 9:48:15       T: 10/02/2020 11:15:53     [email or web address]  Job#: [id number]     Doc#: [id number]    CC:

## 2020-10-02 NOTE — BRIEF OP NOTE
Brief Postoperative Note      Patient: Nirali Chavez  YOB: 1990  MRN: [id number]    Date of Procedure: 10/2/2020    Pre-Op Diagnosis: RIGHT: ACL TEAR - KNEE, MMT, LMT    Post-Op Diagnosis: Same       Procedure(s):  RIGHT: ARTHROSCOPY KNEE ANTERIOR CRUCIATE LIGAMENT RECONSTRUCTION AND POSSIBLE MEDIAL REPAIR. Surgeon(s):  Mike Rodriguez MD    Assistant:  Physician Assistant: Tiffanie Shanks PA-C    Anesthesia: General    Estimated Blood Loss (mL): Minimal    Complications: None    Specimens:   * No specimens in log *    Implants:  Implant Name Type Inv.  Item Serial No.  Lot No. LRB No. Used Action   IMPL KNEE BIO TRNSFX INTERFER ST [id number] Knee IMPL KNEE BIO TRNSFX INTERFER ST [id number]  [employer] [id number] Right 1 Implanted   SCREW BIOCOMP IF VENTED [id number] Knee SCREW BIOCOMP IF VENTED [id number]  [employer] [id number] Right 1 Implanted         Drains: * No LDAs found *    Findings: acl tear  lmt and mmt    Electronically signed by Mike Rodriguez MD on 10/2/2020 at 8:51 AM

## 2020-10-02 NOTE — ANESTHESIA POSTPROCEDURE EVALUATION
Department of Anesthesiology  Postprocedure Note    Patient: Nirali Chavez  MRN: [id number]  YOB: 1990  Date of evaluation: 10/2/2020  Time:  9:02 AM     Procedure Summary     Date:  10/02/20 Room / Location:  [address]    Anesthesia Start:  6402 Anesthesia Stop:      Procedure:  RIGHT: ARTHROSCOPY KNEE ANTERIOR CRUCIATE LIGAMENT RECONSTRUCTION AND POSSIBLE MEDIAL REPAIR. (Right ) Diagnosis:  (RIGHT: ACL TEAR - KNEE, MMT)    Surgeon:  Mike Rodriguez MD Responsible Provider:  Vicente Greenberg MD    Anesthesia Type:  general ASA Status:  2          Anesthesia Type: general    Taylor Phase I: Taylor Score: 10    Taylor Phase II:      Last vitals: Reviewed and per EMR flowsheets.        Anesthesia Post Evaluation    Patient location during evaluation: PACU  Patient participation: complete - patient participated  Level of consciousness: awake  Pain score: 0  Airway patency: patent  Nausea & Vomiting: no nausea  Complications: no  Cardiovascular status: blood pressure returned to baseline  Respiratory status: acceptable  Hydration status: euvolemic

## 2020-10-02 NOTE — PROGRESS NOTES
Pt assisted up to side of cart , states \"I feel slightly light headed\", encouraged to si on side of cart and use urinal, voided, rested back on cart. Pt states \"I feel better now. \" right leg dressing clean and dry, ice and immobilizer maintained. Right pedal pulse palpable. Right toes cool and mobile, pink in color.

## 2020-10-02 NOTE — PROGRESS NOTES
Received in short stay from pacu accompanied in by GABRIELA damico rn, Pt awake and alert. Breath sounds clear to anterior auscultation. SAO2  97% on room air. Right knee dressing clean and dry, ice and immobilizer on. Right pedal pulse palpable at 2+. Right toes slight cool and mobile, pt states \"slight numbness\" but also right knee \"is sore at \"5\" on pain scale.  States, \"I want to wait a minute on pain med \"

## 2020-10-02 NOTE — ANESTHESIA PROCEDURE NOTES
Peripheral Block    Patient location during procedure: pre-op  Staffing  Anesthesiologist: Blake Marsh MD  Performed: anesthesiologist   Preanesthetic Checklist  Completed: patient identified, site marked, surgical consent, pre-op evaluation, timeout performed, IV checked, risks and benefits discussed, monitors and equipment checked, anesthesia consent given, oxygen available and patient being monitored  Peripheral Block  Patient position: supine  Prep: ChloraPrep  Patient monitoring: cardiac monitor, continuous pulse ox, IV access and frequent blood pressure checks  Block type: Saphenous  Laterality: right  Injection technique: single-shot  Procedures: ultrasound guided  Local infiltration: ropivacaine  Infiltration strength: 0.5 %  Dose: 30 mL  Provider prep: mask and sterile gloves  Local infiltration: ropivacaine  Needle  Needle type: combined needle/nerve stimulator   Needle gauge: 21 G  Needle length: 10 cm  Needle localization: ultrasound guidance  Test dose: negative  Assessment  Injection assessment: negative aspiration for heme, no paresthesia on injection and local visualized surrounding nerve on ultrasound  Paresthesia pain: none  Slow fractionated injection: yes  Hemodynamics: stable  Reason for block: post-op pain management

## 2020-10-02 NOTE — PROGRESS NOTES
Discharge instructions reviewed with patient, patient verbalizes understanding. Pt states \"i'd like to rest a minute before I have to get dressed. \" Pt encourgaed to rest. More gingerale and crackers given to patient. Statement Selected

## 2020-10-02 NOTE — ANESTHESIA PRE PROCEDURE
Department of Anesthesiology  Preprocedure Note       Name:  Torie Scott   Age:  31 y.o.  :  1990                                          MRN:  [id number]         Date:  10/2/2020      Surgeon: Mia Camarena):  Cortez Girard MD    Procedure: Procedure(s):  RIGHT: ARTHROSCOPY KNEE ANTERIOR CRUCIATE LIGAMENT RECONSTRUCTION AND POSSIBLE MEDIAL REPAIR. ARTHREX; AUTOGRAFT LATEX ALLERGY    Medications prior to admission:   Prior to Admission medications    Medication Sig Start Date End Date Taking? Authorizing Provider   Probiotic Product (PROBIOTIC DAILY PO) Take 300 mg by mouth   Yes Historical Provider, MD   hydrocortisone (ANUSOL-HC) 2.5 % rectal cream Place rectally 2 times daily.  3/28/19   Zoe Ruiz MD       Current medications:    Current Facility-Administered Medications   Medication Dose Route Frequency Provider Last Rate Last Dose    lactated ringers infusion   Intravenous Continuous ADARSH Bolton  mL/hr at 10/02/20 0637 1,000 mL at 10/02/20 6739    sodium chloride flush 0.9 % injection 10 mL  10 mL Intravenous 2 times per day ADARSH Bolton CNP        sodium chloride flush 0.9 % injection 10 mL  10 mL Intravenous PRN ADARSH Bolton CNP        vancomycin 1000 mg IVPB in 250 mL D5W addavial  1,000 mg Intravenous Once Cortez Girard  mL/hr at 10/02/20 0641 1,000 mg at 10/02/20 0641    fentaNYL (SUBLIMAZE) injection 50 mcg  50 mcg Intravenous Q10 Min PRN Bianca Cooper MD        HYDROmorphone (DILAUDID) injection 0.5 mg  0.5 mg Intravenous Q10 Min PRN Bianca Cooper MD        HYDROcodone-acetaminophen [facility]) 5-325 MG per tablet 1 tablet  1 tablet Oral PRN Bianca Cooper MD        Or    HYDROcodone-acetaminophen [facility]) 5-325 MG per tablet 2 tablet  2 tablet Oral PRN Bianca Cooper MD        diphenhydrAMINE (BENADRYL) injection 12.5 mg  12.5 mg Intravenous Once PRN Bianca Cooper MD       [facility] ondansetron (ZOFRAN) injection 4 mg  4 mg Intravenous Once PRN [place] MD Leticia        metoclopramide [facility]) injection 10 mg  10 mg Intravenous Once PRN [place] MD Leticia        meperidine (DEMEROL) injection 12.5 mg  12.5 mg Intravenous Q5 Min PRN [place] MD Leticia           Allergies: Allergies   Allergen Reactions    Latex Itching     Added based on information entered during case entry, please review and add reactions, type, and severity as needed    Pcn [Penicillins] Anaphylaxis    Poultry Meal Anaphylaxis    Amoxicillin Itching     pt finished 10 day course of Amoxicillin and proceeded to have itching to bilateral hands, arms, neck, and top of head without any visible rash    Tessalon [Benzonatate] Itching       Problem List:  There is no problem list on file for this patient. Past Medical History:        Diagnosis Date    Heart palpitations        Past Surgical History:        Procedure Laterality Date    TONSILLECTOMY AND ADENOIDECTOMY      TYMPANOSTOMY TUBE PLACEMENT      WISDOM TOOTH EXTRACTION         Social History:    Social History     Tobacco Use    Smoking status: Former Smoker     Packs/day: 0.25     Types: Cigarettes     Last attempt to quit: 2015     Years since quittin.0    Smokeless tobacco: Never Used   Substance Use Topics    Alcohol use:  No                                Counseling given: Not Answered      Vital Signs (Current):   Vitals:    10/02/20 0611   BP: (!) 142/88   Pulse: 87   Resp: 20   Temp: 97.8 °F (36.6 °C)   TempSrc: Temporal   SpO2: 98%   Weight: 259 lb (117.5 kg)   Height: 6' 2\" (1.88 m)                                              BP Readings from Last 3 Encounters:   10/02/20 (!) 142/88   20 (!) 142/76   08/10/20 (!) 149/94       NPO Status: Time of last liquid consumption: 0000                        Time of last solid consumption: 0000                        Date of last liquid consumption: 10/02/20 Date of last solid food consumption: 10/02/20    BMI:   Wt Readings from Last 3 Encounters:   10/02/20 259 lb (117.5 kg)   09/28/20 259 lb 6.4 oz (117.7 kg)   08/10/20 260 lb (117.9 kg)     Body mass index is 33.25 kg/m². CBC:   Lab Results   Component Value Date    WBC 7.9 06/07/2016    RBC 5.45 06/07/2016    HGB 16.0 06/07/2016    HCT 46.5 06/07/2016    MCV 85.2 06/07/2016    RDW 13.0 06/07/2016     06/07/2016       CMP:   Lab Results   Component Value Date     01/16/2020    K 4.2 01/16/2020     01/16/2020    CO2 28 06/07/2016    BUN 12 06/07/2016    CREATININE 1.06 01/16/2020    GFRAA >60 01/16/2020    LABGLOM >60 01/16/2020    GLUCOSE 94 06/07/2016    PROT 7.6 06/07/2016    CALCIUM 10.0 06/07/2016    BILITOT 0.5 06/07/2016    ALKPHOS 102 06/07/2016    AST 19 06/07/2016    ALT 24 06/07/2016       POC Tests: No results for input(s): POCGLU, POCNA, POCK, POCCL, POCBUN, POCHEMO, POCHCT in the last 72 hours.     Coags: No results found for: PROTIME, INR, APTT    HCG (If Applicable): No results found for: PREGTESTUR, PREGSERUM, HCG, HCGQUANT     ABGs: No results found for: PHART, PO2ART, VXO3UXF, ZSF9EFL, BEART, X2BNNVKY     Type & Screen (If Applicable):  No results found for: LABABO, LABRH    Drug/Infectious Status (If Applicable):  No results found for: HIV, HEPCAB    COVID-19 Screening (If Applicable):   Lab Results   Component Value Date    COVID19 Not Detected 09/28/2020         Anesthesia Evaluation  Patient summary reviewed and Nursing notes reviewed no history of anesthetic complications:   Airway: Mallampati: II  TM distance: >3 FB   Neck ROM: full  Mouth opening: > = 3 FB Dental: normal exam         Pulmonary:Negative Pulmonary ROS and normal exam                               Cardiovascular:Negative CV ROS                      Neuro/Psych:   Negative Neuro/Psych ROS              GI/Hepatic/Renal: Neg GI/Hepatic/Renal ROS            Endo/Other: Negative Endo/Other ROS Pt had PAT visit. Abdominal:           Vascular: negative vascular ROS. Anesthesia Plan      general     ASA 2       Induction: intravenous. MIPS: Prophylactic antiemetics administered. Anesthetic plan and risks discussed with patient.         Attending anesthesiologist reviewed and agrees with Pre Eval content              Tahmina Mcmillan MD   10/2/2020

## 2020-10-09 ENCOUNTER — HOSPITAL ENCOUNTER (OUTPATIENT)
Dept: PHYSICAL THERAPY | Age: 30
Setting detail: THERAPIES SERIES
Discharge: HOME | End: 2020-10-09
Payer: COMMERCIAL

## 2020-10-09 PROCEDURE — 97110 THERAPEUTIC EXERCISES: CPT

## 2020-10-09 PROCEDURE — 97162 PT EVAL MOD COMPLEX 30 MIN: CPT

## 2020-10-09 PROCEDURE — 97016 VASOPNEUMATIC DEVICE THERAPY: CPT

## 2020-10-09 ASSESSMENT — PAIN DESCRIPTION - DESCRIPTORS: DESCRIPTORS: ACHING;SORE;SHARP

## 2020-10-09 ASSESSMENT — PAIN DESCRIPTION - ORIENTATION: ORIENTATION: RIGHT

## 2020-10-09 ASSESSMENT — PAIN DESCRIPTION - PAIN TYPE: TYPE: SURGICAL PAIN

## 2020-10-09 ASSESSMENT — PAIN SCALES - GENERAL: PAINLEVEL_OUTOF10: 0

## 2020-10-09 ASSESSMENT — PAIN DESCRIPTION - PROGRESSION: CLINICAL_PROGRESSION: GRADUALLY IMPROVING

## 2020-10-09 ASSESSMENT — PAIN - FUNCTIONAL ASSESSMENT: PAIN_FUNCTIONAL_ASSESSMENT: PREVENTS OR INTERFERES WITH ALL ACTIVE AND SOME PASSIVE ACTIVITIES

## 2020-10-09 ASSESSMENT — PAIN DESCRIPTION - FREQUENCY: FREQUENCY: CONTINUOUS

## 2020-10-09 ASSESSMENT — PAIN DESCRIPTION - LOCATION: LOCATION: KNEE

## 2020-10-09 ASSESSMENT — PAIN DESCRIPTION - ONSET: ONSET: SUDDEN

## 2020-10-09 NOTE — PROGRESS NOTES
[address]     Ph: [phone]  Fax: [phone]    [] Certification  [] Recertification [x]  Plan of Care  [] Progress Note [] Discharge      To:  Felipe Suárez      From:  Jermaine Dietrich, PT, DPT  Patient: Saurabh Méndez     : 1990  Diagnosis: ACL tear, MMT     Date: 10/9/2020  Treatment Diagnosis: decreased R knee AROM, decreased R LE strength, impaired gait and functional mobility, and decreased functional activity tolerance     Progress Report Period from:  10/9/2020  to 10/9/2020    Total # of Visits to Date: 1   No Show: 0    Canceled Appointment: 0     OBJECTIVE:   Short Term Goals - Time Frame for Short term goals: 4- 5 weeks    Goals Current/Discharge status  Met   Short term goal 1: The pt will report decreased R knee pain </= 2/10 at worst to increase ease with ADL's  8/10 at worst  [] yes  [x] no   Short term goal 2: The patient will ambulate >/=350 ft independently without AD in order to safely ambulate in the community  Bed mobility  Supine to Sit: Modified independent  Sit to Supine: Modified independent  Comment: slight increased efort to lift R LE though able to indep    Transfers  Sit to Stand: Modified independent  Stand to sit: Modified independent  Comment: requires adaptations d/t immobilizer    Ambulation 1  Surface: carpet  Device: Axillary Crutches  Other Apparatus: Knee Immobilizer(Right)  Assistance: Modified Independent  Quality of Gait: good reciprocal pattern with normal step length  Distance: within dept clincal distances [] yes  [] no     Long Term Goals - Time Frame for Long term goals : 10 weeks  Goals Current/ Discharge status Met   Long term goal 1: The pt will demo improved R LE strength 5/5 in order to safely ambulate unlimited distances without AD indep at PLOF Strength RLE  Comment: hip flex 4/5 ankle DF 5/5 knee 3/5   [] yes  [x] no   Long term goal 2: The pt will demo improved R knee AROM >/=0-130* in order to perform stairs recip indep at PLOF AROM RLE (degrees)  RLE General AROM: knee in supine -10-65; AAROM flexion 85     Stairs  # Steps : 4  Stairs Height: 6\"  Device: Crutches  Assistance: Modified independent   Comment: Vc's for sequencing with good carryover [] yes  [x] no   Long term goal 3: The pt will have a increase in LEFS score >/=10 points in order to increase functional activity tolerance 13/80 [] yes  [x] no   Long term goal 4: The pt will demo improved R SLS >/=30 seconds to increase ease with ambulation NT this date  [] yes  [x] no   Long term goal 5: The pt will be indep/compliant with HEP in order to self manage symptoms upon D/C ongoing [] yes  [x] no      Body structures, Functions, Activity limitations: Decreased functional mobility , Decreased ADL status, Decreased ROM, Decreased strength, Decreased balance, Increased pain, Decreased high-level IADLs, Decreased endurance  Assessment: Pt presents s/p R ACL reconstruction with HS graft and medial menisectomy 10/2/20 with decreased R knee AROM, decreased R Le strength, impaired gait and functional mobility, and decreased functional activity tolerance. These impairments currently limit his fucntional abilities to stand, ambulate, squat, perofrm ADL's, household duties, work duties, or stairs at [employer] without limitations.   Prognosis: Excellent  Discharge Recommendations: Continue to assess pending progress    PT Education: Goals;PT Role;Plan of Care;Home Exercise Program;Functional Mobility Training  Patient Education: stair training    PLAN: [x] Evaluate and Treat  Frequency/Duration:  Plan  Times per week: 1-2  Plan weeks: 10  Current Treatment Recommendations: Strengthening, ROM, Balance Training, Neuromuscular Re-education, Manual Therapy - Soft Tissue Mobilization, Manual Therapy - Joint Manipulation, Gait Training, Transfer Training, Stair training, Patient/Caregiver Education & Training, Safety Education & Training, Home Exercise Program, Aquatics, Equipment Evaluation, Education, & procurement, Modalities, Positioning  Plan Comment: start 2x per week decrease to 1 as able d/t visit limit     Precautions:Restrictions/Precautions: Weight Bearing Right Lower Extremity Weight Bearing: Weight Bearing As Tolerated      Other position/activity restrictions: WBAT with crutches and knee immobilizer x2 weeks, may remove for PT  Mod falls risk, follow protocol in chart                   Patient Status:[x] Continue/ Initiate plan of Care    [] Discharge PT. Recommend pt continue with HEP. [] Additional visits requested, Please re-certify for additional visits:        Signature: Electronically signed by Renae Cadena PT on 10/9/20 at 9:58 AM EDT    If you have any questions or concerns, please don't hesitate to call. Thank you for your referral.    I have reviewed this plan of care and certify a need for medically necessary rehabilitation services.     Physician Signature:__________________________________________________________  Date:  Please sign and return

## 2020-10-09 NOTE — PROGRESS NOTES
from home next week)  Type of occupation:   Leisure & Hobbies: softball, playing drums, working on cars  IADL Comments: current functional level </=20%     Subjective:  Subjective: Pt reports tearing R ACL and medial meniscus while playing softabll 8/10/20 and had surgical repair of ACL with HS graft and medial menisectomy 10/2/20. Pt also reports he strained his MCL at the time of injury. Pt reports he is currently not taking pain meds aside from OTC. Pt was provided SLR and heel slides.   Comments: RTD 10/21/20    Objective:   Ambulation 1  Surface: carpet  Device: Axillary Crutches  Other Apparatus: Knee Immobilizer(Right)  Assistance: Modified Independent  Quality of Gait: good reciprocal pattern with normal step length  Distance: within dept clincal distances  Stairs  # Steps : 4  Stairs Height: 6\"  Device: Crutches  Assistance: Modified independent   Comment: Vc's for sequencing with good carryover     Transfers  Sit to Stand: Modified independent  Stand to sit: Modified independent  Comment: requires adaptations d/t immobilizer    Strength RLE  Comment: hip flex 4/5 ankle DF 5/5 knee 3/5  Strength LLE  Comment: 5/5 grossly     AROM RLE (degrees)  RLE General AROM: knee in supine -10-65; AAROM flexion 85     AROM LLE (degrees)  LLE General AROM: knee 0-138 in supine    Observation/Palpation  Observation: increased ecchymosis noted throughout R HS, popliteal fossa, gastroc/soleus, R quad atrophy  Scar: x5 R anterior knee with scabbing covered with steri strips, scabbing noted throughout  Bed mobility  Supine to Sit: Modified independent  Sit to Supine: Modified independent  Comment: slight increased efort to lift R LE though able to indep    Additional Measures  Special Tests: NA d/t recent surgery     Exercises:   Exercises  Exercise 1: QS 5\"x10  Exercise 2: Heel slides with strap supine 5\"x10  Exercise 3: SLR with QS prior to lift x5  Exercise 4: gastroc stretch*  Exercise 5: Hip abd*  Exercise 6: HR/TR in immobilizer*  Exercise 7: ankle pumps*  Exercise 20: HEP: heel slides with strap, QS, SLR  Modalities:  Modalities  Cryotherapy (Minutes\Location): Int cold/compression x10 min to R knee supine with LE elevated for pain/edema control mid patella pre=44 cm post= NT   Manual:  Manual therapy  Joint mobilization: R patella*  PROM: R knee*  *Indicates exercise,modality, or manual techniques to be initiated when appropriate  Assessment: Body structures, Functions, Activity limitations: Decreased functional mobility , Decreased ADL status, Decreased ROM, Decreased strength, Decreased balance, Increased pain, Decreased high-level IADLs, Decreased endurance  Assessment: Pt presents s/p R ACL reconstruction with HS graft and medial menisectomy 10/2/20 with decreased R knee AROM, decreased R Le strength, impaired gait and functional mobility, and decreased functional activity tolerance. These impairments currently limit his fucntional abilities to stand, ambulate, squat, perofrm ADL's, household duties, work duties, or stairs at [facility] without limitations.   Prognosis: Excellent  Discharge Recommendations: Continue to assess pending progress  Activity Tolerance: Patient Tolerated treatment well     Decision Making: Medium Complexity  History: med  Exam: high; LEFS 13/80  Clinical Presentation: med    Plan  Frequency/Duration:  Plan  Times per week: 1-2  Plan weeks: 10  Current Treatment Recommendations: Strengthening, ROM, Balance Training, Neuromuscular Re-education, Manual Therapy - Soft Tissue Mobilization, Manual Therapy - Joint Manipulation, Gait Training, Transfer Training, Stair training, Patient/Caregiver Education & Training, Safety Education & Training, Home Exercise Program, Aquatics, Equipment Evaluation, Education, & procurement, Modalities, Positioning  Plan Comment: start 2x per week decrease to 1 as able d/t visit limit     Patient Education  New Education Provided: PT Education: Goals;PT Role;Plan of Care;Home Exercise Program;Functional Mobility Training  Patient Education: stair training    POST-PAIN     Pain Rating (0-10 pain scale):   0/10  Location and pain description same as pre-treatment unless indicated. Action: [] NA  [] Call Physician  [x] Perform HEP  [] Meds as prescribed    Evaluation and patient rights have been reviewed and patient agrees with plan of care. Yes  [x]  No  []   Explain:     Marks Fall Risk Assessment  Risk Factor Scale  Score   History of Falls [] Yes  [x] No 25  0    Secondary Diagnosis [] Yes  [x] No 15  0    Ambulatory Aid [] Furniture  [x] Crutches/cane/walker  [] None/bedrest/wheelchair/nurse 30  15  0 15   IV/Heparin Lock [] Yes  [x] No 20  0    Gait/Transferring [] Impaired  [x] Weak  [] Normal/bedrest/immobile 20  10  0 10   Mental Status [] Forgets limitations  [x] Oriented to own ability 15  0       Total: 25     Based on the Assessment score: check the appropriate box.   [x]  No intervention needed   Low =   Score of 0-24  []  Use standard prevention interventions Moderate =  Score of 24-44   [] Discuss fall prevention strategies   [] Indicate moderate falls risk on eval  []  Use high risk prevention interventions High = Score of 45 and higher   [] Discuss fall prevention strategies   [] Provide supervision during treatment time    Goals  Short term goals  Time Frame for Short term goals: 4- 5 weeks  Short term goal 1: The pt will report decreased R knee pain </= 2/10 at worst to increase ease with ADL's  Short term goal 2: The patient will ambulate >/=350 ft independently without AD in order to safely ambulate in the community  Long term goals  Time Frame for Long term goals : 10 weeks  Long term goal 1: The pt will demo improved R LE strength 5/5 in order to safely ambulate unlimited distances without AD indep at PLOF  Long term goal 2: The pt will demo improved R knee AROM >/=0-130* in order to perform stairs recip indep at PLOF  Long term goal 3: The pt will have a

## 2020-10-14 ENCOUNTER — HOSPITAL ENCOUNTER (OUTPATIENT)
Dept: PHYSICAL THERAPY | Age: 30
Setting detail: THERAPIES SERIES
Discharge: HOME | End: 2020-10-14
Payer: COMMERCIAL

## 2020-10-14 PROCEDURE — 97140 MANUAL THERAPY 1/> REGIONS: CPT

## 2020-10-14 PROCEDURE — 97110 THERAPEUTIC EXERCISES: CPT

## 2020-10-14 NOTE — PROGRESS NOTES
[id number] [address]  Outpatient Physical Therapy    Treatment Note        Date: 10/14/2020  Patient: Pilar Esparza  : 1990  ACCT #: [de-identified]  Referring Practitioner: Kateryna Palacios  Diagnosis: ACL tear, MMT    Visit Information:  PT Visit Information  Onset Date: 10/02/20  PT Insurance Information: [address]  Total # of Visits Approved: 20  Total # of Visits to Date: 2  No Show: 0  Canceled Appointment: 0  Progress Note Counter:  (HARD MAX at 20 visits)    Subjective: Pt reports he's starting to feel sensation around the incision now. Cont's to deny any pain. Pt states he feels like he's not getting a good quad contraction w/ QS. Comments: RTD 10/21/20  HEP Compliance:  [x] Good [] Fair [] Poor [] Reports not doing due to:    Vital Signs  Patient Currently in Pain: Denies   Pain Screening  Patient Currently in Pain: Denies    OBJECTIVE:   Exercises  Exercise 1: QS 5\"x15 w/ towel under ankle  Exercise 2: Heel slides with strap supine 5\"x15  Exercise 3: SLR with QS prior to lift x10  Exercise 4: gastroc stretch w/ strap 3x30'' RLE  Exercise 5: S/L hip abd x10  Exercise 6: HR/TR in immobilizer x10 ea (focus on equal WB'ing)  Exercise 7: ankle pumps w/ towel under  Exercise 20: HEP: gastroc stretch, hip abd, HR/TR, ankle pumps    Strength: [x] NT  [] MMT completed:    ROM: [] NT  [x] ROM measurements:  AROM RLE (degrees)  RLE General AROM: knee in supine -3-90 AAROM w/ strap for knee flexion     Manual:   Manual therapy  Joint mobilization: R patella mobs by therapist, then demo'd for pt for HEP    Modalities:  Modalities  Cryotherapy (Minutes\Location): Int cold/compression x10 min to R knee supine with LE elevated for pain/edema control mid patella pre=43.5 cm post=43.0 cm     *Indicates exercise, modality, or manual techniques to be initiated when appropriate    Assessment:    Body structures, Functions, Activity limitations: Decreased functional mobility , Decreased ADL status, Decreased ROM, Decreased strength, Decreased balance, Increased pain, Decreased high-level IADLs, Decreased endurance  Assessment: Initiated tx per POC/within protocol for inc ROM of R knee. Pt demo'ing good P/AAROM w/ knee flexion to 90 deg and knee ext at 3 deg. Pt states QS felt normal/improved technique w/ towel roll under heel. Pt also demo's good patellar mob strategy for HEP. Treatment Diagnosis: decreased R knee AROM, decreased R LE strength, impaired gait and functional mobility, and decreased functional activity tolerance  Prognosis: Excellent     Goals:  Short term goals  Time Frame for Short term goals: 4- 5 weeks  Short term goal 1: The pt will report decreased R knee pain </= 2/10 at worst to increase ease with ADL's  Short term goal 2: The patient will ambulate >/=350 ft independently without AD in order to safely ambulate in the community  Long term goals  Time Frame for Long term goals : 10 weeks  Long term goal 1: The pt will demo improved R LE strength 5/5 in order to safely ambulate unlimited distances without AD indep at PLOF  Long term goal 2: The pt will demo improved R knee AROM >/=0-130* in order to perform stairs recip indep at PLOF  Long term goal 3: The pt will have a increase in LEFS score >/=10 points in order to increase functional activity tolerance  Long term goal 4: The pt will demo improved R SLS >/=30 seconds to increase ease with ambulation  Long term goal 5: The pt will be indep/compliant with HEP in order to self manage symptoms upon D/C  Progress toward goals: inc ROM per protocol    POST-PAIN       Pain Rating (0-10 pain scale):  0 /10   Location and pain description same as pre-treatment unless indicated.    Action: [] NA   [x] Perform HEP  [] Meds as prescribed  [] Modalities as prescribed   [] Call Physician     Frequency/Duration:  Plan  Times per week: 1-2  Plan weeks: 10  Specific instructions for Next Treatment: Pt able to progress to week two of protocol 10/16/2020 ([place])  Current Treatment Recommendations: Strengthening, ROM, Balance Training, Neuromuscular Re-education, Manual Therapy - Soft Tissue Mobilization, Manual Therapy - Joint Manipulation, Gait Training, Transfer Training, Stair training, Patient/Caregiver Education & Training, Safety Education & Training, Home Exercise Program, Aquatics, Equipment Evaluation, Education, & procurement, Modalities, Positioning  Plan Comment: start 2x per week decrease to 1 as able d/t visit limit     Pt to continue current HEP. See objective section for any therapeutic exercise changes, additions or modifications this date.          PT Individual Minutes  Time In: 0492  Time Out: 1742  Minutes: 48  Timed Code Treatment Minutes: 38 Minutes  Procedure Minutes:10     Timed Activity Minutes Units   Ther Ex 33 2   Manual  5 1       Signature:  Electronically signed by Yamileth Acuna PTA on 10/14/20 at 4:56 PM EDT

## 2020-10-16 ENCOUNTER — HOSPITAL ENCOUNTER (OUTPATIENT)
Dept: PHYSICAL THERAPY | Age: 30
Setting detail: THERAPIES SERIES
Discharge: HOME | End: 2020-10-16
Payer: COMMERCIAL

## 2020-10-16 PROCEDURE — 97016 VASOPNEUMATIC DEVICE THERAPY: CPT

## 2020-10-16 PROCEDURE — 97110 THERAPEUTIC EXERCISES: CPT

## 2020-10-16 NOTE — PROGRESS NOTES
[id number] [address]  Outpatient Physical Therapy    Treatment Note        Date: 10/16/2020  Patient: Lauren   : 1990  ACCT #: [de-identified]  Referring Practitioner: Darrow Lennox  Diagnosis: ACL tear, MMT    Visit Information:  PT Visit Information  Onset Date: 10/02/20  PT Insurance Information: [employer]  Total # of Visits Approved: 20  Total # of Visits to Date: 3  No Show: 0  Canceled Appointment: 0  Progress Note Counter: 3/20 (HARD MAX at 20 visits)    Subjective: Pt denies any pain, states incision is looking dry. Comments: RTD 10/21/20  HEP Compliance:  [] Good [] Fair [] Poor [] Reports not doing due to:    Vital Signs  Patient Currently in Pain: Denies   Pain Screening  Patient Currently in Pain: Denies    OBJECTIVE:   Exercises  Exercise 1: QS 5\"x20 w/ towel under ankle  Exercise 2: Heel slides with strap supine 5\"x20  Exercise 3: SLR with QS prior to lift x12  Exercise 4: gastroc stretch w/ strap 3x30'' RLE  Exercise 5: S/L hip abd x12  Exercise 7: R hip add x12  Exercise 8: seated LAQ isometrics at 90 deg and 60 deg knee flex 5''x10 (pt using LLE for resistance)  Exercise 9: standing weight shifts at mat w/o brace lat and fwd/bkwd x20 ea  Exercise 10: wall squats (limited ROM ~30 deg, focus on equal WB'ing) x10  Exercise 19: ambulation around dept w/o knee immobilizer, using b/l crutches, supervision, mild-moderate R knee instability, no LOB, VC's for heel strike, toe off, and knee flex through R swing phase w/ good carryover. instructed pt to wear brace when walking lng distances or on uneven ground, otherwise at home can ambulate w/o. Exercise 20: HEP: hip add, wt shifts, multi-angle iso    Strength: [x] NT  [] MMT completed:    ROM: [] NT  [x] ROM measurements:  AROM RLE (degrees)  RLE General AROM: knee in supine 0-90 AAROM w/ strap for knee flexion     Modalities:  Modalities  Cryotherapy (Minutes\Location):  Int cold/compression x10 min to R knee supine with LE elevated for pain/edema control mid patella pre=42.5 cm post=40.5 cm     *Indicates exercise, modality, or manual techniques to be initiated when appropriate    Assessment: Body structures, Functions, Activity limitations: Decreased functional mobility , Decreased ADL status, Decreased ROM, Decreased strength, Decreased balance, Increased pain, Decreased high-level IADLs, Decreased endurance  Assessment: Cont tx per POC/within protocol for inc strength, ROM, and improved gait. Pt demo's good ROM meeting goal in protocol. Per orders, pt able to ambulate w/o knee immobilizer beginning today. Pt able to ambulate w/ mild-moderate R knee instability and gait deviations that improved over time w/ cues. Treatment Diagnosis: decreased R knee AROM, decreased R LE strength, impaired gait and functional mobility, and decreased functional activity tolerance  Prognosis: Excellent     Goals:  Short term goals  Time Frame for Short term goals: 4- 5 weeks  Short term goal 1: The pt will report decreased R knee pain </= 2/10 at worst to increase ease with ADL's  Short term goal 2: The patient will ambulate >/=350 ft independently without AD in order to safely ambulate in the community  Long term goals  Time Frame for Long term goals : 10 weeks  Long term goal 1: The pt will demo improved R LE strength 5/5 in order to safely ambulate unlimited distances without AD indep at PLOF  Long term goal 2: The pt will demo improved R knee AROM >/=0-130* in order to perform stairs recip indep at PLOF  Long term goal 3: The pt will have a increase in LEFS score >/=10 points in order to increase functional activity tolerance  Long term goal 4: The pt will demo improved R SLS >/=30 seconds to increase ease with ambulation  Long term goal 5:  The pt will be indep/compliant with HEP in order to self manage symptoms upon D/C  Progress toward goals: inc ROM, strength    POST-PAIN       Pain Rating (0-10 pain scale):   0/10   Location and pain description

## 2020-10-21 ENCOUNTER — HOSPITAL ENCOUNTER (OUTPATIENT)
Dept: PHYSICAL THERAPY | Age: 30
Setting detail: THERAPIES SERIES
Discharge: HOME | End: 2020-10-21
Payer: COMMERCIAL

## 2020-10-21 PROCEDURE — 97116 GAIT TRAINING THERAPY: CPT

## 2020-10-21 PROCEDURE — 97110 THERAPEUTIC EXERCISES: CPT

## 2020-10-21 PROCEDURE — 97016 VASOPNEUMATIC DEVICE THERAPY: CPT

## 2020-10-21 NOTE — PROGRESS NOTES
[id number] [address]  Outpatient Physical Therapy    Treatment Note        Date: 10/21/2020  Patient: Nirali Chavez  : 1990  ACCT #: [de-identified]  Referring Practitioner: Nat Lucero  Diagnosis: ACL tear, MMT    Visit Information:  PT Visit Information  Onset Date: 10/02/20  PT Insurance Information: [employer]  Total # of Visits Approved: 20  Total # of Visits to Date: 4  No Show: 0  Canceled Appointment: 0  Progress Note Counter:  (HARD MAX at 20 visits)    Subjective: Pt went to MD appt this AM and brought in new order stating \"wean off crutches, d/c immobilizer. wear short runner brace for next month, may remove for PT. Full ROM and wt bearing. Pt reports walking w/ 0-2 crutches w/ improving stability. Comments: RTD 20  HEP Compliance:  [x] Good [] Fair [] Poor [] Reports not doing due to:    Vital Signs  Patient Currently in Pain: Denies   Pain Screening  Patient Currently in Pain: Denies    OBJECTIVE:   Exercises  Exercise 2: Heel slides with strap [id number]  Exercise 3: SLR with QS prior to lift x20  Exercise 5: S/L hip abd x20  Exercise 6: prone hip ext x10  Exercise 7: R hip add x20  Exercise 9: standing weight shifts at mat w/o brace lat and fwd/bkwd x20 ea  Exercise 10: wall squats (limited ROM ~30 deg, focus on equal WB'ing) x10  Exercise 11: SLS w/ b/l fingertip support x20'', SLS w/ 1 fingertip support x20'', SLS w/o UE support x20''  Exercise 18: *brace donned for WB'ing activity*  Exercise 19: ambulation w/ brace and 1 crutch around dept w/ good stability and much improved heel strike/toe off and knee flexion observed. Also trialed gait w/o AD around dept w/ good stability, mild antalgia, and cont'd good heel strike/toe off/knee flexion.   Exercise 20: HEP: prone hip ext, SLS, wall squats    Strength: [x] NT  [] MMT completed:    ROM: [] NT  [] ROM measurements:  AROM RLE (degrees)  RLE General AROM: knee 0-108 deg supine     Modalities:  Modalities  Cryotherapy (Minutes\Location): Int cold/compression x10 min to R knee supine with LE elevated for pain/edema control mid patella pre=43.5 cm post=42.0 cm     *Indicates exercise, modality, or manual techniques to be initiated when appropriate    Assessment: Body structures, Functions, Activity limitations: Decreased functional mobility , Decreased ADL status, Decreased ROM, Decreased strength, Decreased balance, Increased pain, Decreased high-level IADLs, Decreased endurance  Assessment: Much education given to pt re: current status in regards to protocol and MD's new orders. Pt eager to wean from crutches, however has good understanding of protocol and desire to have good stability for prolonged distances, etc. Educated pt that he can use 1 crutch for longer community distances and trial w/o AD in home w/ knee brace donned w/ pt agreeable. Pt demo's improved AROM of R knee now that he has no ROM restriction. Will cont to gradually wean from AD and improve strength, ROM, and stability of R knee.   Treatment Diagnosis: decreased R knee AROM, decreased R LE strength, impaired gait and functional mobility, and decreased functional activity tolerance  Prognosis: Excellent     Goals:  Short term goals  Time Frame for Short term goals: 4- 5 weeks  Short term goal 1: The pt will report decreased R knee pain </= 2/10 at worst to increase ease with ADL's  Short term goal 2: The patient will ambulate >/=350 ft independently without AD in order to safely ambulate in the community  Long term goals  Time Frame for Long term goals : 10 weeks  Long term goal 1: The pt will demo improved R LE strength 5/5 in order to safely ambulate unlimited distances without AD indep at PLOF  Long term goal 2: The pt will demo improved R knee AROM >/=0-130* in order to perform stairs recip indep at PLOF  Long term goal 3: The pt will have a increase in LEFS score >/=10 points in order to increase functional activity tolerance  Long term goal 4: The pt will

## 2020-10-23 ENCOUNTER — HOSPITAL ENCOUNTER (OUTPATIENT)
Dept: PHYSICAL THERAPY | Age: 30
Setting detail: THERAPIES SERIES
Discharge: HOME | End: 2020-10-23
Payer: COMMERCIAL

## 2020-10-23 PROCEDURE — 97110 THERAPEUTIC EXERCISES: CPT

## 2020-10-23 PROCEDURE — 97016 VASOPNEUMATIC DEVICE THERAPY: CPT

## 2020-10-23 PROCEDURE — 97140 MANUAL THERAPY 1/> REGIONS: CPT

## 2020-10-23 NOTE — PROGRESS NOTES
[id number] [address]  Outpatient Physical Therapy    Treatment Note        Date: 10/23/2020  Patient: Beba Givens  : 1990  ACCT #: [de-identified]  Referring Practitioner: Rob Luis  Diagnosis: ACL tear, MMT    Visit Information:  PT Visit Information  Onset Date: 10/02/20  PT Insurance Information: [employer]  Total # of Visits Approved: 20  Total # of Visits to Date: 5  No Show: 0  Canceled Appointment: 0  Progress Note Counter:  (HARD MAX at 20 visits)    Subjective: Pt reports significant soreness specifically to Rt HS following LV. Pt also notes \"popping\" sensation in Rt knee w/ concentric portion of wall squats however is not painful. No pain currently however pt c/o inc sensitivity over incision upon wearing brace; pt using ramon pad to ease friction. Pt cont's to amb w/ 1 crutch however has started walking short distances at home w/o AD. Comments: RTD 20  HEP Compliance:  [x] Good [] Fair [] Poor [] Reports not doing due to:    Vital Signs  Patient Currently in Pain: Denies   Pain Screening  Patient Currently in Pain: Denies    OBJECTIVE:   Exercises  Exercise 2: Heel slides with strap [id number]''x10  Exercise 3: SLR with QS prior to lift x20  Exercise 5: S/L hip abd x20  Exercise 6: prone hip ext x10  Exercise 7: R hip add x20  Exercise 9: standing weight shifts at mat w/o brace lat and fwd/bkwd x20 ea  Exercise 10: wall squats (limited ROM ~30 deg, focus on equal WB'ing) x10  Exercise 11: SLS w/o UE support 3x30\"  Exercise 12: HS stretch 3x30\"  Exercise 13: B HR x10, B HR w/ Rt ecc focus x10  Exercise 18: *brace donned for WB'ing activity*  Exercise 19: ambulation w/ brace and 1 crutch around dept w/ good stability and much improved heel strike/toe off and knee flexion observed. Also trialed gait w/o AD around dept w/ good stability, mild antalgia, and cont'd good heel strike/toe off/knee flexion.   Exercise 20: HEP:     Strength: [x] NT  [] MMT completed:     ROM: [] NT  [x] ROM measurements:  AROM RLE (degrees)  RLE General AROM: knee 0-112 deg supine      Manual:   Manual therapy  PROM: Knee flexion PROM 4 min in supine    Modalities:  Modalities  Cryotherapy (Minutes\Location): Int cold/compression x10 min to R knee supine with LE elevated for pain/edema control mid patella pre=42.9 cm post=42.0 cm     *Indicates exercise, modality, or manual techniques to be initiated when appropriate    Assessment: Body structures, Functions, Activity limitations: Decreased functional mobility , Decreased ADL status, Decreased ROM, Decreased strength, Decreased balance, Increased pain, Decreased high-level IADLs, Decreased endurance  Assessment: Pt cont's to tolerate further progression of ROM and strengthening program according to sx protocol w/ addition of gentle HS stretch and standing HR's this date; new exs tolerated well. Pt completed wall slides w/o onset of pain or popping upon maintaining ~45 deg squat. Further improvement in Rt knee AROM achieved since LV following manual however cont's to have end range pain. Therapist edu pt on various techniques to improve desensitize of lm incisional region.   Treatment Diagnosis: decreased R knee AROM, decreased R LE strength, impaired gait and functional mobility, and decreased functional activity tolerance  Prognosis: Excellent     Goals:  Short term goals  Time Frame for Short term goals: 4- 5 weeks  Short term goal 1: The pt will report decreased R knee pain </= 2/10 at worst to increase ease with ADL's  Short term goal 2: The patient will ambulate >/=350 ft independently without AD in order to safely ambulate in the community    Long term goals  Time Frame for Long term goals : 10 weeks  Long term goal 1: The pt will demo improved R LE strength 5/5 in order to safely ambulate unlimited distances without AD indep at [facility]  Long term goal 2: The pt will demo improved R knee AROM >/=0-130* in order to perform stairs recip indep at [facility]  Long term goal 3: The pt will have a increase in LEFS score >/=10 points in order to increase functional activity tolerance  Long term goal 4: The pt will demo improved R SLS >/=30 seconds to increase ease with ambulation  Long term goal 5: The pt will be indep/compliant with HEP in order to self manage symptoms upon D/C  Progress toward goals: Rt LE strength     POST-PAIN       Pain Rating (0-10 pain scale):   0/10   Location and pain description same as pre-treatment unless indicated. Action: [x] NA   [] Perform HEP  [] Meds as prescribed  [] Modalities as prescribed   [] Call Physician     Frequency/Duration:  Plan  Times per week: 1-2  Plan weeks: 10  Current Treatment Recommendations: Strengthening, ROM, Balance Training, Neuromuscular Re-education, Manual Therapy - Soft Tissue Mobilization, Manual Therapy - Joint Manipulation, Gait Training, Transfer Training, Stair training, Patient/Caregiver Education & Training, Safety Education & Training, Home Exercise Program, Aquatics, Equipment Evaluation, Education, & procurement, Modalities, Positioning  Plan Comment: start 2x per week decrease to 1 as able d/t visit limit     Pt to continue current HEP. See objective section for any therapeutic exercise changes, additions or modifications this date.      PT Individual Minutes  Time In: 2205  Time Out: 0813  Minutes: 52  Timed Code Treatment Minutes: 42 Minutes  Procedure Minutes: 10 min (game ready)      Timed Activity Minutes Units   Ther Ex 38 3   Manual  4 0       Signature:  Electronically signed by Patricia De La Paz PTA on 10/23/20 at 9:42 AM EDT

## 2020-10-28 ENCOUNTER — HOSPITAL ENCOUNTER (OUTPATIENT)
Dept: PHYSICAL THERAPY | Age: 30
Setting detail: THERAPIES SERIES
Discharge: HOME | End: 2020-10-28
Payer: COMMERCIAL

## 2020-10-28 PROCEDURE — 97016 VASOPNEUMATIC DEVICE THERAPY: CPT

## 2020-10-28 PROCEDURE — 97110 THERAPEUTIC EXERCISES: CPT

## 2020-10-28 NOTE — PROGRESS NOTES
[address] [address]  Outpatient Physical Therapy    Treatment Note        Date: 10/28/2020  Patient: Salomón Cash  : 1990  ACCT #: [de-identified]  Referring Practitioner: Albania Miller  Diagnosis: ACL tear, MMT    Visit Information:  PT Visit Information  Onset Date: 10/02/20  PT Insurance Information: [address]  Total # of Visits Approved: 20  Total # of Visits to Date: 6  No Show: 0  Canceled Appointment: 0  Progress Note Counter:  (HARD MAX at 20 visits)    Subjective: Pt reports yesterday he was sitting in meetings for most of the day and he would get sharp shooting pain in R medial knee towards foot. Pt states it would happen w/ knee bent at 90 deg or even extended. Pt reports no instance of shocks today. Pt ambulating w/ 1 crutch still sometimes in the home when moving around a lot and out in community. Pt reports occ small buckling on R knee during R toe off in gait cycle. Pt reports inc calf tightness lately. Comments: RTD 20  HEP Compliance:  [x] Good [] Fair [] Poor [] Reports not doing due to:    Vital Signs  Patient Currently in Pain: Denies   Pain Screening  Patient Currently in Pain: Denies    OBJECTIVE:   Exercises  Exercise 1: Bike*  Exercise 2: Heel slides with strap supine 5''x10  Exercise 3: 4-way hip x20 ea  Exercise 4: gastroc stretch w/ strap 3x30'' RLE  Exercise 5: HS stretch 3x30\"  Exercise 6: SLS w/o UE support 3x30\"  Exercise 7: wall squats (limited ROM ~45 deg, focus on equal WB'ing) x10  Exercise 8: B HR x15, B HR w/ Rt ecc focus x15  Exercise 18: *brace donned for WB'ing activity*    Strength: [x] NT  [] MMT completed:     ROM: [] NT  [x] ROM measurements:  AROM RLE (degrees)  RLE General AROM: knee 0-114 deg supine; knee AAROM w/ strap 116 deg supine    Modalities:  Modalities  Cryotherapy (Minutes\Location):  Int cold/compression x10 min to R knee supine with LE elevated for pain/edema control mid patella pre=43.0 cm post=42.0 cm     *Indicates exercise, modality, or manual techniques to be initiated when appropriate    Assessment: Body structures, Functions, Activity limitations: Decreased functional mobility , Decreased ADL status, Decreased ROM, Decreased strength, Decreased balance, Increased pain, Decreased high-level IADLs, Decreased endurance  Assessment: Cont tx per POC for inc strength, rom, and improved gait. Pt denies any inc in pain throughout session or instances of shooting pain, though notes inc fatigue in R knee as tx progressed. Educated pt on next phase of protocol and discussed alter-G as potential for gait and eventually running when pt is able. Pt able to ambulate around dept w/o crutch w/ VC's for dec R pronation w/ good follow through. Treatment Diagnosis: decreased R knee AROM, decreased R LE strength, impaired gait and functional mobility, and decreased functional activity tolerance  Prognosis: Excellent     Goals:  Short term goals  Time Frame for Short term goals: 4- 5 weeks  Short term goal 1: The pt will report decreased R knee pain </= 2/10 at worst to increase ease with ADL's  Short term goal 2: The patient will ambulate >/=350 ft independently without AD in order to safely ambulate in the community  Long term goals  Time Frame for Long term goals : 10 weeks  Long term goal 1: The pt will demo improved R LE strength 5/5 in order to safely ambulate unlimited distances without AD indep at P[facility]  Long term goal 2: The pt will demo improved R knee AROM >/=0-130* in order to perform stairs recip indep at [facility]  Long term goal 3: The pt will have a increase in LEFS score >/=10 points in order to increase functional activity tolerance  Long term goal 4: The pt will demo improved R SLS >/=30 seconds to increase ease with ambulation  Long term goal 5:  The pt will be indep/compliant with HEP in order to self manage symptoms upon D/C  Progress toward goals: inc strength, rom, improve gait    POST-PAIN       Pain Rating (0-10 pain scale):   0/10 Location and pain description same as pre-treatment unless indicated. Action: [] NA   [x] Perform HEP  [] Meds as prescribed  [] Modalities as prescribed   [] Call Physician     Frequency/Duration:  Plan  Times per week: 1-2  Plan weeks: 10  Specific instructions for Next Treatment: Pt able to progress to week four of protocol 10/30/2020 ([place])  Current Treatment Recommendations: Strengthening, ROM, Balance Training, Neuromuscular Re-education, Manual Therapy - Soft Tissue Mobilization, Manual Therapy - Joint Manipulation, Gait Training, Transfer Training, Stair training, Patient/Caregiver Education & Training, Safety Education & Training, Home Exercise Program, Aquatics, Equipment Evaluation, Education, & procurement, Modalities, Positioning  Plan Comment: start 2x per week decrease to 1 as able d/t visit limit     Pt to continue current HEP. See objective section for any therapeutic exercise changes, additions or modifications this date.     PT Individual Minutes  Time In: 1470  Time Out: 1709  Minutes: 48  Timed Code Treatment Minutes: 38 Minutes  Procedure Minutes: 10     Timed Activity Minutes Units   Ther Ex 38 3       Signature:  Electronically signed by Sunita Ron PTA on 10/28/20 at 5:17 PM EDT

## 2020-10-30 ENCOUNTER — HOSPITAL ENCOUNTER (OUTPATIENT)
Dept: PHYSICAL THERAPY | Age: 30
Setting detail: THERAPIES SERIES
Discharge: HOME | End: 2020-10-30
Payer: COMMERCIAL

## 2020-10-30 PROCEDURE — 97016 VASOPNEUMATIC DEVICE THERAPY: CPT

## 2020-10-30 PROCEDURE — 97140 MANUAL THERAPY 1/> REGIONS: CPT

## 2020-10-30 PROCEDURE — 97110 THERAPEUTIC EXERCISES: CPT

## 2020-10-30 ASSESSMENT — PAIN DESCRIPTION - ORIENTATION: ORIENTATION: RIGHT

## 2020-10-30 ASSESSMENT — PAIN DESCRIPTION - DESCRIPTORS: DESCRIPTORS: SORE

## 2020-10-30 ASSESSMENT — PAIN SCALES - GENERAL: PAINLEVEL_OUTOF10: 1

## 2020-10-30 NOTE — PROGRESS NOTES
[id number] [address]  Outpatient Physical Therapy    Treatment Note        Date: 10/30/2020  Patient: Slick Carpenter  : 1990  ACCT #: [de-identified]  Referring Practitioner: Chani Andujar  Diagnosis: ACL tear, MMT    Visit Information:  PT Visit Information  Onset Date: 10/02/20  PT Insurance Information: [address]  Total # of Visits Approved: 20  Total # of Visits to Date: 7  No Show: 0  Canceled Appointment: 0  Progress Note Counter:  (HARD MAX at 20 visits)    Subjective: Pt presenting to appt reporting concerns w/ feeling a sudden \"pop\" in posterior knee/ medial hamstring region while performing toe up in standing following HR exercise. Pt expresses inc soreness along  HS since \"incident\" however pain has since eased and does not exceed 1-2/10. Pt reports doing desensitization massage at home states \"It seems to really be helping, Iit doesn't feel good but I can tell it's working. \"  Comments: RTD 20  HEP Compliance:  [x] Good [] Fair [] Poor [] Reports not doing due to:    Vital Signs  Patient Currently in Pain: Yes   Pain Screening  Patient Currently in Pain: Yes  Pain Assessment  Pain Assessment: 0-10  Pain Level: 1(1-2)  Pain Orientation: Right  Pain Descriptors: Sore    OBJECTIVE:   Exercises  Exercise 1: Bike, seat L5 5 min  Exercise 2: Heel slides with strap supine 5''x10  Exercise 3: 4-way hip x20 ea (consider progressing w/ ankle wt's NV)  Exercise 4: gastroc stretch w/ strap 3x30'' RLE  Exercise 5: HS stretch 3x30\"  Exercise 6: SLS w/o UE support 3x30\"  Exercise 7: wall squats (limited ROM ~45 deg, focus on equal WB'ing) x10  Exercise 8: B HR w/ Rt ecc focus 2x10  Exercise 9: TG full range squats w/ double leg eccentric 2x10 L9  Exercise 10: Isolated HS curls*  Exercise 18: *brace donned for WB'ing activity*    Strength: [x] NT  [] MMT completed:     ROM: [] NT  [x] ROM measurements:  AROM RLE (degrees)  RLE General AROM: knee 0-114 deg supine post tx      Manual:   Manual

## 2020-11-04 ENCOUNTER — HOSPITAL ENCOUNTER (OUTPATIENT)
Dept: PHYSICAL THERAPY | Age: 30
Setting detail: THERAPIES SERIES
Discharge: HOME | End: 2020-11-04
Payer: COMMERCIAL

## 2020-11-04 PROCEDURE — 97016 VASOPNEUMATIC DEVICE THERAPY: CPT

## 2020-11-04 PROCEDURE — 97110 THERAPEUTIC EXERCISES: CPT

## 2020-11-04 NOTE — PROGRESS NOTES
techniques to be initiated when appropriate    Assessment: Body structures, Functions, Activity limitations: Decreased functional mobility , Decreased ADL status, Decreased ROM, Decreased strength, Decreased balance, Increased pain, Decreased high-level IADLs, Decreased endurance  Assessment: Progressed therex this date per protocol for improved strength, rom, and improved gait. Initiated mod lynda stretch for dec quad tightness w/ pt reporting significant stretch felt w/o inc pain. Also added iso hold w/ wall squats w/ pt reporting inc fatigue and difficulty. Also initiated TM walking as pt felt he was not getting full knee ext during stance phase, however good gait quality observed, especially w/ use of mirror for biofeedback. Pt also observed to have less edema in R knee, evident w/ circumferential measurement much less than last tx prior to gameready. Treatment Diagnosis: decreased R knee AROM, decreased R LE strength, impaired gait and functional mobility, and decreased functional activity tolerance  Prognosis: Excellent     Goals:  Short term goals  Time Frame for Short term goals: 4- 5 weeks  Short term goal 1: The pt will report decreased R knee pain </= 2/10 at worst to increase ease with ADL's  Short term goal 2: The patient will ambulate >/=350 ft independently without AD in order to safely ambulate in the community    Long term goals  Time Frame for Long term goals : 10 weeks  Long term goal 1: The pt will demo improved R LE strength 5/5 in order to safely ambulate unlimited distances without AD indep at PLOF  Long term goal 2: The pt will demo improved R knee AROM >/=0-130* in order to perform stairs recip indep at PLOF  Long term goal 3: The pt will have a increase in LEFS score >/=10 points in order to increase functional activity tolerance  Long term goal 4: The pt will demo improved R SLS >/=30 seconds to increase ease with ambulation  Long term goal 5:  The pt will be indep/compliant with HEP in order to self manage symptoms upon D/C  Progress toward goals: inc strength, rom, improve gait    POST-PAIN       Pain Rating (0-10 pain scale):  0 /10   Location and pain description same as pre-treatment unless indicated. Action: [] NA   [] Perform HEP  [] Meds as prescribed  [] Modalities as prescribed   [] Call Physician     Frequency/Duration:  Plan  Times per week: 1-2  Plan weeks: 10  Current Treatment Recommendations: Strengthening, ROM, Balance Training, Neuromuscular Re-education, Manual Therapy - Soft Tissue Mobilization, Manual Therapy - Joint Manipulation, Gait Training, Transfer Training, Stair training, Patient/Caregiver Education & Training, Safety Education & Training, Home Exercise Program, Aquatics, Equipment Evaluation, Education, & procurement, Modalities, Positioning  Plan Comment: start 2x per week decrease to 1 as able d/t visit limit     Pt to continue current HEP. See objective section for any therapeutic exercise changes, additions or modifications this date.          PT Individual Minutes  Time In: 5255  Time Out: 7337  Minutes: 51  Timed Code Treatment Minutes: 41 Minutes  Procedure Minutes:10     Timed Activity Minutes Units   Ther Ex 41 3       Signature:  Electronically signed by Kelly Monroe PTA on 11/4/20 at 4:01 PM EST

## 2020-11-06 ENCOUNTER — HOSPITAL ENCOUNTER (OUTPATIENT)
Dept: PHYSICAL THERAPY | Age: 30
Setting detail: THERAPIES SERIES
Discharge: HOME | End: 2020-11-06
Payer: COMMERCIAL

## 2020-11-06 PROCEDURE — 97110 THERAPEUTIC EXERCISES: CPT

## 2020-11-06 NOTE — PROGRESS NOTES
[address] [address]  Outpatient Physical Therapy    Treatment Note        Date: 2020  Patient: Wei Vance  : 1990  ACCT #: [de-identified]  Referring Practitioner: Sheila Pierre  Diagnosis: ACL tear, MMT    Visit Information:  PT Visit Information  Onset Date: 10/02/20  PT Insurance Information: [address]  Total # of Visits Approved: 20  Total # of Visits to Date: 9  No Show: 0  Canceled Appointment: 0  Progress Note Counter:  (HARD MAX at 20 visits)    Subjective: Pt reports he feels he has contd difficulty fully straightening his knee with walking. Pt reports having intermittent shooting pains reaching 7-8/10 though are brief and occur on lateral LE/ITB, incision, and medial joint line. Comments: RTD 20  HEP Compliance:  [x] Good [] Fair [] Poor [] Reports not doing due to:    Vital Signs  Patient Currently in Pain: Denies   Pain Screening  Patient Currently in Pain: Denies    OBJECTIVE:   Exercises  Exercise 1: Bike, seat L5 5 min  Exercise 3: 4-way hip with RTB x10, R TKE RTB 5\"x15  Exercise 4: treadmill walking: fwd x5 mins 1. 3mph w/ focus on gait quality and equal weight shifting w/o UE support w/ mirror for biofeedback, etro x3 min 0.6 mph focus on TKE  Exercise 7: wall squats to about 80-90 deg 5'' hold x10  Exercise 9: TG full range squats w/ double leg eccentric 2x10 L9  Exercise 10: HS curls seated YTB x10  Exercise 11: small rockerboard 3 way x10 light- no UE assist  Exercise 12: step ups F/L 6\"x10  Exercise 13: lunges*  Exercise 14: SLS*  Exercise 20: HEP: 4 way hip, TKE, (verbally single leg HR vs dbl)    Strength: [] NT  [] MMT completed:  Strength RLE  Comment: SLR, knee flex, ext 4/5    ROM: [x] NT  [] ROM measurements:    Modalities: CP to R knee supine with Le elevated for post exertion pain control x10 mins     *Indicates exercise, modality, or manual techniques to be initiated when appropriate    Assessment:    Body structures, Functions, Activity limitations: Decreased functional mobility , Decreased ADL status, Decreased ROM, Decreased strength, Decreased balance, Increased pain, Decreased high-level IADLs, Decreased endurance  Assessment: Progressed wbing exercises for increased functional LE strength with focus on quad control for improved gait quality. Pt demos improving R knee strength through MMT though with contd limitations. All wbing exercises performed with brace doffed for improved LE strength, stability, and proprioception. Treatment Diagnosis: decreased R knee AROM, decreased R LE strength, impaired gait and functional mobility, and decreased functional activity tolerance  Prognosis: Excellent     Goals:  Short term goals  Time Frame for Short term goals: 4- 5 weeks  Short term goal 1: The pt will report decreased R knee pain </= 2/10 at worst to increase ease with ADL's  Short term goal 2: The patient will ambulate >/=350 ft independently without AD in order to safely ambulate in the community    Long term goals  Time Frame for Long term goals : 10 weeks  Long term goal 1: The pt will demo improved R LE strength 5/5 in order to safely ambulate unlimited distances without AD indep at PLOF  Long term goal 2: The pt will demo improved R knee AROM >/=0-130* in order to perform stairs recip indep at PLOF  Long term goal 3: The pt will have a increase in LEFS score >/=10 points in order to increase functional activity tolerance  Long term goal 4: The pt will demo improved R SLS >/=30 seconds to increase ease with ambulation  Long term goal 5: The pt will be indep/compliant with HEP in order to self manage symptoms upon D/C  Progress toward goals: good    POST-PAIN       Pain Rating (0-10 pain scale):  1 /10   Location and pain description same as pre-treatment unless indicated.    Action: [] NA   [x] Perform HEP  [] Meds as prescribed  [] Modalities as prescribed   [] Call Physician     Frequency/Duration:  Plan  Times per week: 1-2  Plan weeks: 10  Current Treatment Recommendations: Strengthening, ROM, Balance Training, Neuromuscular Re-education, Manual Therapy - Soft Tissue Mobilization, Manual Therapy - Joint Manipulation, Gait Training, Transfer Training, Stair training, Patient/Caregiver Education & Training, Safety Education & Training, Home Exercise Program, Aquatics, Equipment Evaluation, Education, & procurement, Modalities, Positioning  Plan Comment: start 2x per week decrease to 1 as able d/t visit limit     Pt to continue current HEP. See objective section for any therapeutic exercise changes, additions or modifications this date.     PT Individual Minutes  Time In: 0740  Time Out: 5247  Minutes: 52  Timed Code Treatment Minutes: 42 Minutes  Procedure Minutes: 10 CP     Timed Activity Minutes Units   Ther Ex 42 3       Signature:  Electronically signed by Myles Huddleston PT on 11/6/20 at 8:17 AM EST

## 2020-11-11 ENCOUNTER — HOSPITAL ENCOUNTER (OUTPATIENT)
Dept: PHYSICAL THERAPY | Age: 30
Setting detail: THERAPIES SERIES
Discharge: HOME | End: 2020-11-11
Payer: COMMERCIAL

## 2020-11-11 PROCEDURE — 97110 THERAPEUTIC EXERCISES: CPT

## 2020-11-11 PROCEDURE — 97140 MANUAL THERAPY 1/> REGIONS: CPT

## 2020-11-11 ASSESSMENT — PAIN DESCRIPTION - PAIN TYPE: TYPE: ACUTE PAIN

## 2020-11-11 ASSESSMENT — PAIN DESCRIPTION - LOCATION: LOCATION: KNEE

## 2020-11-11 ASSESSMENT — PAIN DESCRIPTION - DESCRIPTORS: DESCRIPTORS: CONSTANT

## 2020-11-11 ASSESSMENT — PAIN SCALES - GENERAL: PAINLEVEL_OUTOF10: 7

## 2020-11-11 ASSESSMENT — PAIN DESCRIPTION - ORIENTATION: ORIENTATION: RIGHT

## 2020-11-11 NOTE — PROGRESS NOTES
[address] [address]  Outpatient Physical Therapy    Treatment Note        Date: 2020  Patient: Abimbola Bell  : 1990  ACCT #: [de-identified]  Referring Practitioner: Lias Siu  Diagnosis: ACL tear, MMT    Visit Information:  PT Visit Information  Onset Date: 10/02/20  PT Insurance Information: [address]  Total # of Visits Approved: 20  Total # of Visits to Date: 10  No Show: 0  Canceled Appointment: 0  Progress Note Counter: 10/20 (HARD MAX at 20 visits)    Subjective: Pt reports soreness lasting 1 hour after LV. Pt states last night he was doing ant/post wt shifts and had sharp pain under the knee cap and is still risidual today. Pt reports cont'd brief sharp-shooting pains in medial knee.   Comments: RTD 20  HEP Compliance:  [x] Good [] Fair [] Poor [] Reports not doing due to:    Vital Signs  Patient Currently in Pain: Yes   Pain Screening  Patient Currently in Pain: Yes  Pain Assessment  Pain Assessment: 0-10  Pain Level: 7  Pain Type: Acute pain  Pain Location: Knee  Pain Orientation: Right  Pain Descriptors: Constant(\"it just hurts\")    OBJECTIVE:   Exercises  Exercise 1: Bike, seat L5 5 min  Exercise 3: 4-way hip with RTB x12, R TKE RTB 5\"x15 w/ minimal wt through RLE, then equal wt between b/l LE's 5''x15  Exercise 7: wall squats to about 90 deg 5'' hold x10  Exercise 11: large rockerboard 3 way x10 light- light fingertip support  Exercise 12: step ups F/L 6\"x12  Exercise 13: mini lunges fwd x10 b/l  Exercise 18: *brace doffed for WB'ing activity*    Strength: [x] NT  [] MMT completed:    ROM: [] NT  [x] ROM measurements:  AROM RLE (degrees)  RLE General AROM: knee 0-124     Manual:   Manual therapy  Joint mobilization: R patella mobs superior/inferior and lateral - no pain  Soft Tissue Mobalization: CFM to healed incision; demo'd for HEP  Other: total manual x8 mins    Modalities:  Modalities  Cryotherapy (Minutes\Location): D/C int compression d/t min changes and dec edema  Other: declined CP, will ice at home     *Indicates exercise, modality, or manual techniques to be initiated when appropriate    Assessment: Body structures, Functions, Activity limitations: Decreased functional mobility , Decreased ADL status, Decreased ROM, Decreased strength, Decreased balance, Increased pain, Decreased high-level IADLs, Decreased endurance  Assessment: Pt reporting no pain w/ knee mobs, however inc pain in ant knee w/ quad set or full knee ext in [address] While performing rockerboard, pt pointed to pain right under incision vs at direct ant knee, therefore demo'd CFM to healed incision w/ pt performing w/ good technique. Then upon standing and walking/performing full knee ext, pt stated it \"already felt a lot better. \" Initiated mini lunges fwd w/ good technique and no pain. Treatment Diagnosis: decreased R knee AROM, decreased R LE strength, impaired gait and functional mobility, and decreased functional activity tolerance  Prognosis: Excellent     Goals:  Short term goals  Time Frame for Short term goals: 4- 5 weeks  Short term goal 1: The pt will report decreased R knee pain </= 2/10 at worst to increase ease with ADL's  Short term goal 2: The patient will ambulate >/=350 ft independently without AD in order to safely ambulate in the community  Long term goals  Time Frame for Long term goals : 10 weeks  Long term goal 1: The pt will demo improved R LE strength 5/5 in order to safely ambulate unlimited distances without AD indep at PLOF  Long term goal 2: The pt will demo improved R knee AROM >/=0-130* in order to perform stairs recip indep at PLOF  Long term goal 3: The pt will have a increase in LEFS score >/=10 points in order to increase functional activity tolerance  Long term goal 4: The pt will demo improved R SLS >/=30 seconds to increase ease with ambulation  Long term goal 5:  The pt will be indep/compliant with HEP in order to self manage symptoms upon D/C  Progress toward goals: inc strength, rom, dec pain     POST-PAIN       Pain Rating (0-10 pain scale):   0-1/10   Location and pain description same as pre-treatment unless indicated. Action: [] NA   [x] Perform HEP  [] Meds as prescribed  [] Modalities as prescribed   [] Call Physician     Frequency/Duration:  Plan  Times per week: 1-2  Plan weeks: 10  Current Treatment Recommendations: Strengthening, ROM, Balance Training, Neuromuscular Re-education, Manual Therapy - Soft Tissue Mobilization, Manual Therapy - Joint Manipulation, Gait Training, Transfer Training, Stair training, Patient/Caregiver Education & Training, Safety Education & Training, Home Exercise Program, Aquatics, Equipment Evaluation, Education, & procurement, Modalities, Positioning  Plan Comment: start 2x per week decrease to 1 as able d/t visit limit     Pt to continue current HEP. See objective section for any therapeutic exercise changes, additions or modifications this date.          PT Individual Minutes  Time In: 0952  Time Out: 4171  Minutes: 42  Timed Code Treatment Minutes: 42 Minutes  Procedure Minutes:0     Timed Activity Minutes Units   Ther Ex 34 2   Manual  8 1       Signature:  Electronically signed by Gorge Masters PTA on 11/11/20 at 5:24 PM EST

## 2020-11-13 ENCOUNTER — HOSPITAL ENCOUNTER (OUTPATIENT)
Dept: PHYSICAL THERAPY | Age: 30
Setting detail: THERAPIES SERIES
Discharge: HOME | End: 2020-11-13
Payer: COMMERCIAL

## 2020-11-13 PROCEDURE — 97110 THERAPEUTIC EXERCISES: CPT

## 2020-11-13 NOTE — PROGRESS NOTES
[address] [address]  Outpatient Physical Therapy    Treatment Note        Date: 2020  Patient: Berkley Gurrola  : 1990  ACCT #: [de-identified]  Referring Practitioner: Dilcia Brown  Diagnosis: ACL tear, MMT    Visit Information:  PT Visit Information  Onset Date: 10/02/20  PT Insurance Information: [address]  Total # of Visits Approved: 20  Total # of Visits to Date: 11  No Show: 0  Canceled Appointment: 0  Progress Note Counter:  (HARD MAX at 20 visits)    Subjective: Pt presents to appt w/ no pain. Pt expressed significant changes and relief since incorporating CFM into daily routine. Comments: RTD 20  HEP Compliance:  [x] Good [] Fair [] Poor [] Reports not doing due to:    Vital Signs  Patient Currently in Pain: Denies   Pain Screening  Patient Currently in Pain: Denies    OBJECTIVE:   Exercises  Exercise 1: Bike, seat L5 5 min  Exercise 3: 4-way hip with RTB x12, R TKE RTB 5\"x15 w/ minimal wt through RLE, then equal wt between b/l LE's 5''x20  Exercise 7: wall squats to about 90 deg 5'' hold x10  Exercise 11: large rockerboard 3 way x15 ea  w/o support  Exercise 12: step ups F/L 6\"x12  Exercise 13: mini lunges fwd 2x10 b/l  Exercise 14: SLS 30\" w/o UE support (no LOB), 30\"x3 on foam w/o UE support (minimal sway)  Exercise 15:  Step downs on 4\" box  x10 Lt lead only this date  Exercise 18: *brace doffed for WB'ing activity*    Ambulation 1  Surface: carpet  Device: No Device  Other Apparatus: (w/o brace)  Assistance: Independent  Quality of Gait: good reciprocal pattern with normal step length  Distance: within dept clincal distances    Stairs  # Steps : 12  Stairs Height: 6\"  Device: No Device  Assistance: Independent  Comment: Pt completes w/ good eccentric control however requires to perform at slower than normal pace; mild quad discomfort/soreness    Strength: [] NT  [x] MMT completed:  Strength RLE  Comment: Knee:ext 4+/5, flex 5/5; Hip: 5/5 except ext 4+/5; ankle 5/5 ROM: [] NT  [x] ROM measurements:  AROM RLE (degrees)  RLE General AROM: knee 0-121 in supine post tx     Modalities:  Modalities  Cryotherapy (Minutes\Location): CP to Rt knee 10 min in seated for dec muscle soreness post exertion     *Indicates exercise, modality, or manual techniques to be initiated when appropriate    Assessment: Body structures, Functions, Activity limitations: Decreased functional mobility , Decreased ADL status, Decreased ROM, Decreased strength, Decreased balance, Increased pain, Decreased high-level IADLs, Decreased endurance  Assessment: Pt has shown great tolerance to continuous advancment of exercises according to protocol w/ muscle soreness recovering within 1-2 hrs post exertion. Pt has transitioned to [address] w/o brace without complaint. Pt approaching strength goals upon MMT w/ main area of weakness remaining in knee/hip ext. Further improvements in Lt knee ROM limited by \"tightness\" this date though denies incisional pain. Pt self reports 65% normal function at this time.   Treatment Diagnosis: decreased R knee AROM, decreased R LE strength, impaired gait and functional mobility, and decreased functional activity tolerance  Prognosis: Excellent     Goals:  Short term goals  Time Frame for Short term goals: 4- 5 weeks  Short term goal 1: The pt will report decreased R knee pain </= 2/10 at worst to increase ease with ADL's  Short term goal 2: The patient will ambulate >/=350 ft independently without AD in order to safely ambulate in the community  Long term goals  Time Frame for Long term goals : 10 weeks  Long term goal 1: The pt will demo improved R LE strength 5/5 in order to safely ambulate unlimited distances without AD indep at PLOF  Long term goal 2: The pt will demo improved R knee AROM >/=0-130* in order to perform stairs recip indep at PLOF  Long term goal 3: The pt will have improvement in LEFS score >/=70/80 in order to return to PLOF(updated 11/13/20)  Long term goal 4: The pt will demo improved R SLS >/=30 seconds to increase ease with ambulation  Long term goal 5: The pt will be indep/compliant with HEP in order to self manage symptoms upon D/C  Progress toward goals: Please refer to PN for details. POST-PAIN       Pain Rating (0-10 pain scale):   0/10   Location and pain description same as pre-treatment unless indicated. Action: [x] NA   [] Perform HEP  [] Meds as prescribed  [] Modalities as prescribed   [] Call Physician     Frequency/Duration:  Plan  Times per week: 1-2  Plan weeks: 10  Specific instructions for Next Treatment: Pt may begin phase 4 of protocol NV*  Current Treatment Recommendations: Strengthening, ROM, Balance Training, Neuromuscular Re-education, Manual Therapy - Soft Tissue Mobilization, Manual Therapy - Joint Manipulation, Gait Training, Transfer Training, Stair training, Patient/Caregiver Education & Training, Safety Education & Training, Home Exercise Program, Aquatics, Equipment Evaluation, Education, & procurement, Modalities, Positioning  Plan Comment: start 2x per week decrease to 1 as able d/t visit limit; PN completed this date for RTD 11/18/20     Pt to continue current HEP. See objective section for any therapeutic exercise changes, additions or modifications this date.      PT Individual Minutes  Time In: 0720  Time Out: 2188  Minutes: 50  Timed Code Treatment Minutes: 40 Minutes  Procedure Minutes: 10 min (CP)      Timed Activity Minutes Units   Ther Ex 40 3     Signature:  Electronically signed by Celine Boyd PTA on 11/13/20 at 10:37 AM EST

## 2020-11-13 NOTE — PROGRESS NOTES
Stephen robert, [address]     Ph: [phone]  Fax: [phone]    [] Certification  [] Recertification []  Plan of Care  [x] Progress Note [] Discharge      To:  Dilcia Brown      From:  Michael Holly, PT, DPT  Patient: Berkley Gurrola     : 1990  Diagnosis: ACL tear, MMT     Date: 2020  Treatment Diagnosis: decreased R knee AROM, decreased R LE strength, impaired gait and functional mobility, and decreased functional activity tolerance      Progress Report Period from:  10/9/2020  to 2020    Total # of Visits to Date: 11   No Show: 0    Canceled Appointment: 0     OBJECTIVE:   Short Term Goals - Time Frame for Short term goals: 4- 5 weeks    Goals Current/Discharge status  Met   Short term goal 1: The pt will report decreased R knee pain </= 2/10 at worst to increase ease with ADL's   Pt denies pain currently.  Pain ranges from 0-6/10 at most.  [x] yes  [x] no   Short term goal 2: The patient will ambulate >/=350 ft independently without AD in order to safely ambulate in the community  Ambulation 1  Surface: carpet  Device: No Device  Other Apparatus: (w/o brace)  Assistance: Independent  Quality of Gait: good reciprocal pattern with normal step length  Distance: within dept clincal distances [x] yes  [] no     Long Term Goals - Time Frame for Long term goals : 10 weeks  Goals Current/ Discharge status Met   Long term goal 1: The pt will demo improved R LE strength 5/5 in order to safely ambulate unlimited distances without AD indep at PLOF Strength RLE  Comment: Knee:ext 4+/5, flex 5/5; Hip: 5/5 except ext 4+/5; ankle 5/5    [x] yes  [x] no   Long term goal 2: The pt will demo improved R knee AROM >/=0-130* in order to perform stairs recip indep at PLOF AROM RLE (degrees)  RLE General AROM: knee 0-121 in supine post tx     Stairs  # Steps : 12  Stairs Height: 6\"  Device: No Device  Assistance: Independent  Comment: Pt completes w/ good eccentric control however requires to perform at slower than normal pace; mild quad discomfort/soreness [x] yes  [x] no   Long term goal 3: The pt will have a increase in LEFS score >/=10 points in order to increase functional activity tolerance   Updated: The pt will have improvement in LEFS score >/=70/80 in order to return to PLOF  LEFS=47/80 or 59% function; 65% function per verbal report  [x] yes  [x] no   Long term goal 4: The pt will demo improved R SLS >/=30 seconds to increase ease with ambulation Pt able to maintain good bal/stability 30\" on even surface and foam  [x] yes  [] no   Long term goal 5: The pt will be indep/compliant with HEP in order to self manage symptoms upon D/C Indep w/ ongoing  [x] yes  [x] no      Body structures, Functions, Activity limitations: Decreased functional mobility , Decreased ADL status, Decreased ROM, Decreased strength, Decreased balance, Increased pain, Decreased high-level IADLs, Decreased endurance  Assessment: Pt with good progress towards goals with contd deficits in functional R LE strength and knee ROM primarily limited by \"tightness. \"  Pt self reports 65% normal function at this time. Pt would cont to benefit from PT to further address remaining deficits and return to highest level of attainable function.    Specific instructions for Next Treatment: Pt may begin phase 4 of protocol NV*  Prognosis: Excellent  Discharge Recommendations: Continue to assess pending progress     PLAN:   Treatment Frquency/Duration:  Plan  Times per week: 1-2  Plan weeks: 10  Specific instructions for Next Treatment: Pt may begin phase 4 of protocol NV*  Current Treatment Recommendations: Strengthening, ROM, Balance Training, Neuromuscular Re-education, Manual Therapy - Soft Tissue Mobilization, Manual Therapy - Joint Manipulation, Gait Training, Transfer Training, Stair training, Patient/Caregiver Education & Training, Safety Education & Training, Home Exercise Program, Aquatics, Equipment Evaluation, Education, & procurement, Modalities, Positioning  Plan Comment: start 2x per week decrease to 1 as able d/t visit limit; PN completed this date for RTD 11/18/20     Precautions: Following progression per surgical protocol                      Patient Status:[x] Continue/ Initiate plan of Care    [] Discharge PT. Recommend pt continue with HEP. [] Additional visits requested, Please re-certify for additional visits:        Signature: Electronically signed by Slick Jang PT on 11/13/2020 at 11:00 AM  Electronically signed by Cynthia Espinoza PTA on 11/13/20 at 10:38 AM EST    If you have any questions or concerns, please don't hesitate to call. Thank you for your referral.    I have reviewed this plan of care and certify a need for medically necessary rehabilitation services.     Physician Signature:__________________________________________________________  Date:  Please sign and return

## 2020-11-18 ENCOUNTER — HOSPITAL ENCOUNTER (OUTPATIENT)
Dept: PHYSICAL THERAPY | Age: 30
Setting detail: THERAPIES SERIES
Discharge: HOME | End: 2020-11-18
Payer: COMMERCIAL

## 2020-11-18 PROCEDURE — 97110 THERAPEUTIC EXERCISES: CPT

## 2020-11-18 NOTE — PROGRESS NOTES
[address] [address]  Outpatient Physical Therapy    Treatment Note        Date: 2020  Patient: Ingrid Butler  : 1990  ACCT #: [de-identified]  Referring Practitioner: Radha Rodriguez  Diagnosis: ACL tear, MMT    Visit Information:  PT Visit Information  Onset Date: 10/02/20  PT Insurance Information: [address]  Total # of Visits Approved: 20  Total # of Visits to Date: 12  No Show: 0  Canceled Appointment: 0  Progress Note Counter:  (HARD MAX at 20 visits)    Subjective: Pt reports MD pleased w/ progress. Pt received new ACL brace that MD told pt to wear when active only. Pt reports no pain currently. Pt denies shooting-type pains the last few days. Pt states MD told him no sharp turns w/ running. Comments: RTD 2 months (2021)  HEP Compliance:  [x] Good [] Fair [] Poor [] Reports not doing due to:    Vital Signs  Patient Currently in Pain: Denies   Pain Screening  Patient Currently in Pain: Denies    OBJECTIVE:   Exercises  Exercise 1: Bike, seat L5 5 min  Exercise 3: 4-way hip with GTB x10, R TKE GTB 5''x15  Exercise 4: large rockerboard 4-way x15 ea w/o UE support  Exercise 5: TG Rt SL squat (w/ L toe down) x10  Exercise 6: step ups 8'' F/L x10 ea  Exercise 7: step downs 8''x10  Exercise 8: fwd/lat lunges x10 b/l  Exercise 9: mini-tramp jog dbl leg x20''; SL x20'', light jog x1 min  Exercise 10: alter-G fast walk w/ progression to jog w/ brace donned*  Exercise 18: *brace doffed for WB'ing activity*  Exercise 20: reviewed previous and current HEP w/ pt. Administered updated HEP w/ what ex's pt should now be focusing on: 4-way hip, TKE, step ups F/L, step downs, F/L lunges, SL heel raise, heel slides     Strength: [x] NT  [] MMT completed:     ROM: [] NT  [x] ROM measurements:  AROM RLE (degrees)  RLE General AROM: knee 0-125 in supine post tx    *Indicates exercise, modality, or manual techniques to be initiated when appropriate    Assessment:    Body structures, Functions, Activity limitations: Decreased functional mobility , Decreased ADL status, Decreased ROM, Decreased strength, Decreased balance, Increased pain, Decreased high-level IADLs, Decreased endurance  Assessment: progressed pt within 6-week protocol w/ good leonardo to new additions w/o c/o inc pain, only fatigue. Pt admits to nervousness on mini-tramp, though improved slightly w/ time. Pt also demo's increased R knee ROM this date. Discussed trialing alter-G NV w/ pt agreeable. Also discussed possibly decreasing frequency to 1x/week after NV d/t insurance limit w/ pt agreeable. Treatment Diagnosis: decreased R knee AROM, decreased R LE strength, impaired gait and functional mobility, and decreased functional activity tolerance  Prognosis: Excellent     Goals:  Short term goals  Time Frame for Short term goals: 4- 5 weeks  Short term goal 1: The pt will report decreased R knee pain </= 2/10 at worst to increase ease with ADL's  Short term goal 2: The patient will ambulate >/=350 ft independently without AD in order to safely ambulate in the community  Long term goals  Time Frame for Long term goals : 10 weeks  Long term goal 1: The pt will demo improved R LE strength 5/5 in order to safely ambulate unlimited distances without AD indep at PLOF  Long term goal 2: The pt will demo improved R knee AROM >/=0-130* in order to perform stairs recip indep at PLOF  Long term goal 3: The pt will have improvement in LEFS score >/=70/80 in order to return to PLOF(updated 11/13/20)  Long term goal 4: The pt will demo improved R SLS >/=30 seconds to increase ease with ambulation  Long term goal 5: The pt will be indep/compliant with HEP in order to self manage symptoms upon D/C  Progress toward goals: inc strength, rom, improve gait    POST-PAIN       Pain Rating (0-10 pain scale):   0/10   Location and pain description same as pre-treatment unless indicated.    Action: [] NA   [x] Perform HEP  [] Meds as prescribed  [] Modalities as prescribed   [] Call Physician     Frequency/Duration:  Plan  Times per week: 1-2  Plan weeks: 10  Current Treatment Recommendations: Strengthening, ROM, Balance Training, Neuromuscular Re-education, Manual Therapy - Soft Tissue Mobilization, Manual Therapy - Joint Manipulation, Gait Training, Transfer Training, Stair training, Patient/Caregiver Education & Training, Safety Education & Training, Home Exercise Program, Aquatics, Equipment Evaluation, Education, & procurement, Modalities, Positioning  Plan Comment: possibly decrease to 1x/week after NV d/t ins limit     Pt to continue current HEP. See objective section for any therapeutic exercise changes, additions or modifications this date.          PT Individual Minutes  Time In: 0337  Time Out: 6523  Minutes: 44  Timed Code Treatment Minutes: 44 Minutes  Procedure Minutes:0     Timed Activity Minutes Units   Ther Ex 44 3       Signature:  Electronically signed by Nataliia Goncalves PTA on 11/18/20 at 5:53 PM EST

## 2020-11-20 ENCOUNTER — HOSPITAL ENCOUNTER (OUTPATIENT)
Dept: PHYSICAL THERAPY | Age: 30
Setting detail: THERAPIES SERIES
Discharge: HOME | End: 2020-11-20
Payer: COMMERCIAL

## 2020-11-20 PROCEDURE — 97110 THERAPEUTIC EXERCISES: CPT

## 2020-11-20 NOTE — PROGRESS NOTES
[id number] [address]  Outpatient Physical Therapy    Treatment Note        Date: 2020  Patient: Abdiaziz Minaya  : 1990  ACCT #: [de-identified]  Referring Practitioner: Concepcion Steen  Diagnosis: ACL tear, MMT    Visit Information:  PT Visit Information  Onset Date: 10/02/20  PT Insurance Information: [address]  Total # of Visits Approved: 20  Total # of Visits to Date: 13  No Show: 0  Canceled Appointment: 0  Progress Note Counter:  (HARD MAX at 20 visits)    Subjective: no pain currently, my gait feel alot better, I was alittle sore from my new exercises  Comments: RTD 2 months (2021)  HEP Compliance:  [x] Good [] Fair [] Poor [] Reports not doing due to:    Vital Signs  Patient Currently in Pain: Denies   Pain Screening  Patient Currently in Pain: Denies    OBJECTIVE:   Exercises  Exercise 2: SLS w/ reach x 15  Exercise 3: 4-way hip with GTB x10, no UE support  Exercise 4: large rockerboard 4-way x15 ea w/o UE support  Exercise 8: BOSU lunges F/L 3 sec x 15  Exercise 9: mini-tramp jog dbl leg x20''; SL x20'', light jog x1 min  Exercise 10: Alter-G, 80%, various speeds up to 2.0mph, 8 min total       Strength: [] NT  [x] MMT completed:  Strength RLE  Comment: SLR 5/         ROM: [x] NT  [] ROM measurements:           *Indicates exercise, modality, or manual techniques to be initiated when appropriate    Assessment:         Body structures, Functions, Activity limitations: Decreased functional mobility , Decreased ADL status, Decreased ROM, Decreased strength, Decreased balance, Increased pain, Decreased high-level IADLs, Decreased endurance  Assessment: initiated walking in Alter-G at various speeds, w/ brace on, very very slight discomfort with RLE at full ext during gait, good gait pattern noted, added SLS w/ reach to improve LE strength, stability, good tolerance to all  Treatment Diagnosis: decreased R knee AROM, decreased R LE strength, impaired gait and functional mobility, and decreased functional activity tolerance  Prognosis: Excellent       Goals:  Short term goals  Time Frame for Short term goals: 4- 5 weeks  Short term goal 1: The pt will report decreased R knee pain </= 2/10 at worst to increase ease with ADL's  Short term goal 2: The patient will ambulate >/=350 ft independently without AD in order to safely ambulate in the community    Long term goals  Time Frame for Long term goals : 10 weeks  Long term goal 1: The pt will demo improved R LE strength 5/5 in order to safely ambulate unlimited distances without AD indep at PLOF  Long term goal 2: The pt will demo improved R knee AROM >/=0-130* in order to perform stairs recip indep at PLOF  Long term goal 3: The pt will have improvement in LEFS score >/=70/80 in order to return to PLOF(updated 11/13/20)  Long term goal 4: The pt will demo improved R SLS >/=30 seconds to increase ease with ambulation  Long term goal 5: The pt will be indep/compliant with HEP in order to self manage symptoms upon D/C  Progress toward goals:improved strength as noted    POST-PAIN       Pain Rating (0-10 pain scale):  0 /10   Location and pain description same as pre-treatment unless indicated. Action: [x] NA   [] Perform HEP  [] Meds as prescribed  [] Modalities as prescribed   [] Call Physician     Frequency/Duration:  Plan  Times per week: 1-2  Plan weeks: 10  Current Treatment Recommendations: Strengthening, ROM, Balance Training, Neuromuscular Re-education, Manual Therapy - Soft Tissue Mobilization, Manual Therapy - Joint Manipulation, Gait Training, Transfer Training, Stair training, Patient/Caregiver Education & Training, Safety Education & Training, Home Exercise Program, Aquatics, Equipment Evaluation, Education, & procurement, Modalities, Positioning  Plan Comment: possibly decrease to 1x/week after NV d/t ins limit     Pt to continue current HEP.   See objective section for any therapeutic exercise changes, additions or modifications

## 2020-11-25 ENCOUNTER — HOSPITAL ENCOUNTER (OUTPATIENT)
Dept: PHYSICAL THERAPY | Age: 30
Setting detail: THERAPIES SERIES
Discharge: HOME | End: 2020-11-25
Payer: COMMERCIAL

## 2020-11-25 NOTE — PROGRESS NOTES
[address]       Physical Therapy  Cancellation/No-show Note  Patient Name:  Carlos Napier  :  1990   Date:  2020  Referring Practitioner: Concha Macdonald  Diagnosis: ACL tear, MMT    Visit Information:  PT Visit Information  Onset Date: 10/02/20  PT Insurance Information: [address]  Total # of Visits Approved: 20  Total # of Visits to Date: 13  No Show: 0  Canceled Appointment: 1  Progress Note Counter:  (HARD MAX at 20 visits) CX 2020    For today's appointment patient:  [x]  Cancelled  []  Rescheduled appointment  []  No-show   []  Called pt to remind of next appointment     Reason given by patient:  []  Patient ill  []  Conflicting appointment  []  No transportation    [x]  Conflict with work  []  No reason given  []  Other:      Signature: Electronically signed by Jose Meza PTA on 20 at 4:52 PM EST

## 2020-11-27 ENCOUNTER — APPOINTMENT (OUTPATIENT)
Dept: PHYSICAL THERAPY | Age: 30
End: 2020-11-27
Payer: COMMERCIAL

## 2020-12-02 ENCOUNTER — HOSPITAL ENCOUNTER (OUTPATIENT)
Dept: PHYSICAL THERAPY | Age: 30
Setting detail: THERAPIES SERIES
Discharge: HOME | End: 2020-12-02
Payer: COMMERCIAL

## 2020-12-02 PROCEDURE — 97110 THERAPEUTIC EXERCISES: CPT

## 2020-12-02 NOTE — PROGRESS NOTES
[id number] [address]  Outpatient Physical Therapy    Treatment Note        Date: 2020  Patient: Torie Scott  : 1990  ACCT #: [de-identified]  Referring Practitioner: Amberly Shannon  Diagnosis: ACL tear, MMT    Visit Information:  PT Visit Information  Onset Date: 10/02/20  PT Insurance Information: [address]  Total # of Visits Approved: 20  Total # of Visits to Date: 14  No Show: 0  Canceled Appointment: 1  Progress Note Counter:  (HARD MAX at 20 visits)    Subjective: Pt denies pain currently, however pt states he slipped on ice this AM w/o brace on and his knee felt like it hyper-extended a little and he had pain, but it has since gone away. Pt also reports while doing heel slides at home, he feels a \"weird rolling or popping\" sensation at superio-lateral knee at end range. Pt also reports occ patellar tendon pain w/ ambulation, but not constant. Pt also asking about other calf ex's stating \"my calves still feel very weak. \"  Comments: RTD 2 months (2021)  HEP Compliance:  [x] Good [] Fair [] Poor [] Reports not doing due to:    Vital Signs  Patient Currently in Pain: Denies   Pain Screening  Patient Currently in Pain: Denies    OBJECTIVE:   Exercises  Exercise 5: TG Rt SL squat (w/ L toe down) x12; HR x15 w/ 5'' eccentric  Exercise 8: BOSU lunges F/L 3 sec x 15 b/l w/ int UE support  Exercise 9: mini-tramp jog dbl leg x20''; SL x20'', light jog x1 min  Exercise 10: Alter-G pant M, L11, 80% WB'ing ambulation to brisk walk 2.5-3. 2mph x6 mins  Exercise 11: HR off step w/ 5'' eccentric dbl leg x10; R SL w/ L toe down x10  Exercise 12: SL plyotoss @ tramp 2# ball x10; on foam x10  Exercise 20: HEP: dbl HR, SL HR, discussed rep progression on HEP for select ex's.     Strength: [x] NT  [] MMT completed:     ROM: [x] NT  [] ROM measurements:     Manual:   Manual therapy  Other: had pt perform heel slides, pt unable to reporduce sx's of reported \"weird rolloing or popping sensation. \"    *Indicates exercise, modality, or manual techniques to be initiated when appropriate    Assessment: Body structures, Functions, Activity limitations: Decreased functional mobility , Decreased ADL status, Decreased ROM, Decreased strength, Decreased balance, Increased pain, Decreased high-level IADLs, Decreased endurance  Assessment: Cont w/ tx per POC for inc strength, rom, and improved gait. Progressed speed in alter-G to a brisk walk up to 3. 2mph w/ pt denying any pain in patellar tendon. Also initiated various calf ex's to further progress strengthening HEP. Also added SL plyoball toss at minitramp w/ and w/o pt standing on foam w/ good stability observed. Unable to reproduce rolling/popping sensation in R knee that pt mentioned at beginning of tx. Treatment Diagnosis: decreased R knee AROM, decreased R LE strength, impaired gait and functional mobility, and decreased functional activity tolerance  Prognosis: Excellent     Goals:  Short term goals  Time Frame for Short term goals: 4- 5 weeks  Short term goal 1: The pt will report decreased R knee pain </= 2/10 at worst to increase ease with ADL's  Short term goal 2: The patient will ambulate >/=350 ft independently without AD in order to safely ambulate in the community  Long term goals  Time Frame for Long term goals : 10 weeks  Long term goal 1: The pt will demo improved R LE strength 5/5 in order to safely ambulate unlimited distances without AD indep at PLOF  Long term goal 2: The pt will demo improved R knee AROM >/=0-130* in order to perform stairs recip indep at PLOF  Long term goal 3: The pt will have improvement in LEFS score >/=70/80 in order to return to PLOF(updated 11/13/20)  Long term goal 4: The pt will demo improved R SLS >/=30 seconds to increase ease with ambulation  Long term goal 5:  The pt will be indep/compliant with HEP in order to self manage symptoms upon D/C  Progress toward goals: inc strength, rom, improve gait    POST-PAIN       Pain Rating (0-10 pain scale):   0/10   Location and pain description same as pre-treatment unless indicated. Action: [] NA   [x] Perform HEP  [] Meds as prescribed  [] Modalities as prescribed   [] Call Physician     Frequency/Duration:  Plan  Times per week: 1-2  Plan weeks: 10  Current Treatment Recommendations: Strengthening, ROM, Balance Training, Neuromuscular Re-education, Manual Therapy - Soft Tissue Mobilization, Manual Therapy - Joint Manipulation, Gait Training, Transfer Training, Stair training, Patient/Caregiver Education & Training, Safety Education & Training, Home Exercise Program, Aquatics, Equipment Evaluation, Education, & procurement, Modalities, Positioning  Plan Comment: pt decreased to 1x/wk d/t ins limitation     Pt to continue current HEP. See objective section for any therapeutic exercise changes, additions or modifications this date.          PT Individual Minutes  Time In: 2172  Time Out: 7145  Minutes: 39  Timed Code Treatment Minutes: 39 Minutes  Procedure Minutes:0     Timed Activity Minutes Units   Ther Ex 39 3       Signature:  Electronically signed by Heladio Sarkar PTA on 12/2/20 at 4:57 PM EST

## 2020-12-04 ENCOUNTER — APPOINTMENT (OUTPATIENT)
Dept: PHYSICAL THERAPY | Age: 30
End: 2020-12-04
Payer: COMMERCIAL

## 2020-12-09 ENCOUNTER — HOSPITAL ENCOUNTER (OUTPATIENT)
Dept: PHYSICAL THERAPY | Age: 30
Setting detail: THERAPIES SERIES
Discharge: HOME | End: 2020-12-09
Payer: COMMERCIAL

## 2020-12-09 PROCEDURE — 97110 THERAPEUTIC EXERCISES: CPT

## 2020-12-09 NOTE — PROGRESS NOTES
[address] [address]  Outpatient Physical Therapy    Treatment Note        Date: 2020  Patient: Holli Shone  : 1990  ACCT #: [de-identified]  Referring Practitioner: Kobe Lr  Diagnosis: ACL tear, MMT    Visit Information:  PT Visit Information  Onset Date: 10/02/20  PT Insurance Information: [address]  Total # of Visits Approved: 20  Total # of Visits to Date: 15  No Show: 0  Canceled Appointment: 1  Progress Note Counter: 15/20 (HARD MAX at 20 visits)    Subjective: Pt reports \"things are good, the calf exercises get me. \" Pt reports still having difficulty w/ knee flexion stretch d/t \"tightness. \" Pt reports he no longer has sharp shooting pains, but still will get pain in patellar tendon w/ heel strike/stance phase of gait. Pt reports \"popping\" sensation is happening less frequently. Comments: RTD 2 months (2021)  HEP Compliance:  [x] Good [] Fair [] Poor [] Reports not doing due to:    Vital Signs  Patient Currently in Pain: Denies   Pain Screening  Patient Currently in Pain: Denies    OBJECTIVE:   Exercises  Exercise 1: Bike, seat L4 5 min  Exercise 2: prone quad stretch w/ strap 30''x3  Exercise 3: leg press in gym 1x10 @ 60#, 2x10 @ 80#  Exercise 4: squats to floor to  black crate w/ 8# wt inside x10  Exercise 5: step ups F/L 10'' (metal step) x10 ea  Exercise 6: split squats x8 b/l  Exercise 7: inverted BOSU mini squats x10  Exercise 8: cool down stretching: HS stretch, gastroc stretch, hip flexor stretch 3x30'' ea    Strength: [x] NT  [] MMT completed:    ROM: [x] NT  [] ROM measurements:    *Indicates exercise, modality, or manual techniques to be initiated when appropriate    Assessment:    Body structures, Functions, Activity limitations: Decreased functional mobility , Decreased ADL status, Decreased ROM, Decreased strength, Decreased balance, Increased pain, Decreased high-level IADLs, Decreased endurance  Assessment: Progressed strengthening ex's this date for improved functional mobility as pt's ultimate goal is to return to heavy housework and assisting father w/ heavy-duty tasks around the house/in the yard. Pt demo's good stability w/ split squats and inverted BOSU mini-squats. Pt denies inc in pain, though notes significant muscle soreness. Treatment Diagnosis: decreased R knee AROM, decreased R LE strength, impaired gait and functional mobility, and decreased functional activity tolerance  Prognosis: Excellent     Goals:  Short term goals  Time Frame for Short term goals: 4- 5 weeks  Short term goal 1: The pt will report decreased R knee pain </= 2/10 at worst to increase ease with ADL's  Short term goal 2: The patient will ambulate >/=350 ft independently without AD in order to safely ambulate in the community  Long term goals  Time Frame for Long term goals : 10 weeks  Long term goal 1: The pt will demo improved R LE strength 5/5 in order to safely ambulate unlimited distances without AD indep at PLOF  Long term goal 2: The pt will demo improved R knee AROM >/=0-130* in order to perform stairs recip indep at PLOF  Long term goal 3: The pt will have improvement in LEFS score >/=70/80 in order to return to PLOF(updated 11/13/20)  Long term goal 4: The pt will demo improved R SLS >/=30 seconds to increase ease with ambulation  Long term goal 5: The pt will be indep/compliant with HEP in order to self manage symptoms upon D/C  Progress toward goals: inc strength, rom    POST-PAIN       Pain Rating (0-10 pain scale):  0 /10   Location and pain description same as pre-treatment unless indicated.    Action: [] NA   [x] Perform HEP  [] Meds as prescribed  [] Modalities as prescribed   [] Call Physician     Frequency/Duration:  Plan  Times per week: 1-2  Plan weeks: 10  Current Treatment Recommendations: Strengthening, ROM, Balance Training, Neuromuscular Re-education, Manual Therapy - Soft Tissue Mobilization, Manual Therapy - Joint Manipulation, Gait Training, Transfer

## 2020-12-11 ENCOUNTER — APPOINTMENT (OUTPATIENT)
Dept: PHYSICAL THERAPY | Age: 30
End: 2020-12-11
Payer: COMMERCIAL

## 2020-12-16 ENCOUNTER — HOSPITAL ENCOUNTER (OUTPATIENT)
Dept: PHYSICAL THERAPY | Age: 30
Setting detail: THERAPIES SERIES
Discharge: HOME | End: 2020-12-16
Payer: COMMERCIAL

## 2020-12-16 PROCEDURE — 97110 THERAPEUTIC EXERCISES: CPT

## 2020-12-16 NOTE — PROGRESS NOTES
[address] [address]  Outpatient Physical Therapy    Treatment Note        Date: 2020  Patient: Katie Tang  : 1990  ACCT #: [de-identified]  Referring Practitioner: Marleen Cristina  Diagnosis: ACL tear, MMT    Visit Information:  PT Visit Information  Onset Date: 10/02/20  PT Insurance Information: [address]  Total # of Visits Approved: 20  Total # of Visits to Date: 16  No Show: 0  Canceled Appointment: 1  Progress Note Counter:  (HARD MAX at 20 visits)    Subjective: Pt reports bumping knee into recliner while walking past it and had a lot of pain, but the next day pt states \"I felt great. \" Pt reports he cont's to get pain in full knee ext in anterior and posterior knee, but states it's only a 1/10 stating \"I can just feel it. \" Pt denies significant soreness after LV. Pt mentioned during tx that he is getting nausea and dizziness occasionally while stretching. Pt reports he often stretches into pain. Educated pt to dec intensity of stretch and assess tolerance.   Comments: RTD 2 months (2021)  HEP Compliance:  [x] Good [] Fair [] Poor [] Reports not doing due to:    Vital Signs  Patient Currently in Pain: Denies   Pain Screening  Patient Currently in Pain: Denies    OBJECTIVE:   Exercises  Exercise 1: Bike, seat L4 5 min  Exercise 3: leg press in gym 2x10 @ 80#, 2x10 @ 100#  Exercise 4: squats to floor to  black crate w/ 15# wt inside x10  Exercise 5: step ups F/L 10'' (metal step) x12 ea  Exercise 6: split squats x10 b/l  Exercise 7: inverted BOSU mini squats x12  Exercise 8: CC walkouts 6 plates 4-way x5 ea - occ LOB during lateral movements, but pt able to self correct  Exercise 9: pistol squats*  Exercise 10: cool down stretching: HS stretch, gastroc stretch stretch 30''x3 - dec intensity w/ improved leonardo and no nausea or dizziness reported    Strength: [x] NT  [] MMT completed:     ROM: [x] NT  [] ROM measurements:    *Indicates exercise, modality, or manual techniques Modalities as prescribed   [] Call Physician     Frequency/Duration:  Plan  Times per week: 1-2  Plan weeks: 10  Current Treatment Recommendations: Strengthening, ROM, Balance Training, Neuromuscular Re-education, Manual Therapy - Soft Tissue Mobilization, Manual Therapy - Joint Manipulation, Gait Training, Transfer Training, Stair training, Patient/Caregiver Education & Training, Safety Education & Training, Home Exercise Program, Aquatics, Equipment Evaluation, Education, & procurement, Modalities, Positioning  Plan Comment: pt decreased to 1x/wk d/t ins limitation     Pt to continue current HEP. See objective section for any therapeutic exercise changes, additions or modifications this date.          PT Individual Minutes  Time In: 9256  Time Out: 9847  Minutes: 42  Timed Code Treatment Minutes: 42 Minutes  Procedure Minutes:0     Timed Activity Minutes Units   Ther Ex 42 3       Signature:  Electronically signed by Kaitlynn Bonilla PTA on 12/16/20 at 5:48 PM EST

## 2020-12-17 ENCOUNTER — NURSE ONLY (OUTPATIENT)
Dept: FAMILY MEDICINE CLINIC | Age: 30
End: 2020-12-17

## 2020-12-17 ENCOUNTER — VIRTUAL VISIT (OUTPATIENT)
Dept: FAMILY MEDICINE CLINIC | Age: 30
End: 2020-12-17
Payer: COMMERCIAL

## 2020-12-17 DIAGNOSIS — B34.9 ACUTE VIRAL SYNDROME: ICD-10-CM

## 2020-12-17 DIAGNOSIS — Z20.822 CLOSE EXPOSURE TO COVID-19 VIRUS: ICD-10-CM

## 2020-12-17 PROCEDURE — 99213 OFFICE O/P EST LOW 20 MIN: CPT | Performed by: NURSE PRACTITIONER

## 2020-12-17 ASSESSMENT — PATIENT HEALTH QUESTIONNAIRE - PHQ9
SUM OF ALL RESPONSES TO PHQ QUESTIONS 1-9: 2
2. FEELING DOWN, DEPRESSED OR HOPELESS: 1
SUM OF ALL RESPONSES TO PHQ QUESTIONS 1-9: 2
1. LITTLE INTEREST OR PLEASURE IN DOING THINGS: 1
SUM OF ALL RESPONSES TO PHQ9 QUESTIONS 1 & 2: 2
SUM OF ALL RESPONSES TO PHQ QUESTIONS 1-9: 2

## 2020-12-17 ASSESSMENT — ENCOUNTER SYMPTOMS
COUGH: 0
NAUSEA: 1
WHEEZING: 0
SHORTNESS OF BREATH: 0
RHINORRHEA: 1
VOMITING: 0
DIARRHEA: 1
TROUBLE SWALLOWING: 0
SORE THROAT: 1

## 2020-12-17 NOTE — PATIENT INSTRUCTIONS
· Limit contact with people in your home. If possible, stay in a separate bedroom and use a separate bathroom. · Wear a cloth face cover when you are around other people. It can help stop the spread of the virus when you cough or sneeze. · If you have to leave home, avoid crowds and try to stay at least 6 feet away from other people. · Avoid contact with pets and other animals. · Cover your mouth and nose with a tissue when you cough or sneeze. Then throw it in the trash right away. · Wash your hands often, especially after you cough or sneeze. Use soap and water, and scrub for at least 20 seconds. If soap and water aren't available, use an alcohol-based hand . · Don't share personal household items. These include bedding, towels, cups and glasses, and eating utensils. · [address] in the warmest water allowed for the fabric type, and dry it completely. It's okay to wash other people's laundry with yours. · Clean and disinfect your home every day. Use household  and disinfectant wipes or sprays. Take special care to clean things that you grab with your hands. These include doorknobs, remote controls, phones, and handles on your refrigerator and microwave. And don't forget countertops, tabletops, bathrooms, and computer keyboards. When you can end self-isolation  · If you know or suspect that you have COVID-19, stay in self-isolation until:  ? You haven't had a fever for 3 days, and  ? Your symptoms have improved, and  ? It's been at least 10 days since your symptoms started. · Talk to your doctor about whether you also need testing, especially if you have a weakened immune system. When should you call for help? Call 911 anytime you think you may need emergency care. For example, call if you have life-threatening symptoms, such as:    · You have severe trouble breathing. (You can't talk at all.)     · You have constant chest pain or pressure.   · You are severely dizzy or lightheaded.     · You are confused or can't think clearly.     · Your face and lips have a blue color.     · You pass out (lose consciousness) or are very hard to wake up. Call your doctor now or seek immediate medical care if:    · You have moderate trouble breathing. (You can't speak a full sentence.)     · You are coughing up blood (more than about 1 teaspoon).     · You have signs of low blood pressure. These include feeling lightheaded; being too weak to stand; and having cold, pale, clammy skin. Watch closely for changes in your health, and be sure to contact your doctor if:    · Your symptoms get worse.     · You are not getting better as expected. Call before you go to the doctor's office. Follow their instructions. And wear a cloth face cover. Current as of: July 10, 2020               Content Version: 12.6  © 2006-2020 [employer]. Care instructions adapted under license by [facility] ([facility]). If you have questions about a medical condition or this instruction, always ask your healthcare professional. [address] any warranty or liability for your use of this information.

## 2020-12-17 NOTE — PROGRESS NOTES
TELEHEALTH EVALUATION -- Audio/Visual (During - public health emergency)    -   Myles Krishnan is a 31 y.o. male being evaluated by a Virtual Visit (video visit) encounter to address concerns as mentioned above. A caregiver was present when appropriate. Due to this being a TeleHealth encounter (During  public health emergency), evaluation of the following organ systems was limited: Vitals/Constitutional/EENT/Resp/CV/GI//MS/Neuro/Skin/Heme-Lymph-Imm. Pursuant to the emergency declaration under the [address] [place] [address], [address] [place] [address] and the [employer] Act, this Virtual Visit was conducted with patient's (and/or legal guardian's) consent, to reduce the patient's risk of exposure to COVID-19 and provide necessary medical care. The patient (and/or legal guardian) has also been advised to contact this office for worsening conditions or problems, and seek emergency medical treatment and/or call 911 if deemed necessary. Patient was contacted and agreed to proceed with a virtual visit via [email or web address]. me  The risks and benefits of converting to a virtual visit were discussed in light of the current infectious disease epidemic. Patient also understood that insurance coverage and co-pays are up to their individual insurance plans. Patient was located at their home. Provider was located at their office.      2020  Myles Krishnan (:  1990) has requested an audio/video evaluation for the following concern(s):    HPI     He had a covid exposure on  and   There was a work party and people that went are testing positive   Symptoms started over the weekend   Fatigue Tuesday  Muscle pain   Currently recovering from ACL surgery Did the depression screen and he chose several days for both questions. He feels he tolerates it well without medication. Did let him know if he would like to discuss further or think about any medication that we could see him for that. Pt understood. Review of Systems   Constitutional: Positive for fatigue. Negative for chills and fever. HENT: Positive for rhinorrhea and sore throat. Negative for congestion and trouble swallowing. Respiratory: Negative for cough, shortness of breath and wheezing. Gastrointestinal: Positive for diarrhea and nausea. Negative for vomiting. Musculoskeletal: Positive for myalgias. Skin: Negative for rash. Neurological: Positive for headaches. Negative for dizziness and light-headedness. Prior to Visit Medications    Medication Sig Taking? Authorizing Provider   Probiotic Product (PROBIOTIC DAILY PO) Take 300 mg by mouth  Historical Provider, MD   hydrocortisone (ANUSOL-HC) 2.5 % rectal cream Place rectally 2 times daily.   Yaa Dao MD       Past Medical History:   Diagnosis Date    Heart palpitations      Past Surgical History:   Procedure Laterality Date    ANTERIOR CRUCIATE LIGAMENT REPAIR Right 10/2/2020    RIGHT: ARTHROSCOPY KNEE ANTERIOR CRUCIATE LIGAMENT RECONSTRUCTION AND  MEDIAL + LATERAL MENISCENTOMY performed by Rei Gill MD at [address] [place] [address] TYMPANOSTOMY TUBE PLACEMENT      WISDOM TOOTH EXTRACTION       Social History     Socioeconomic History    Marital status: Single     Spouse name: Not on file    Number of children: Not on file    Years of education: Not on file    Highest education level: Not on file   Occupational History    Not on file   Social Needs    Financial resource strain: Not on file    Food insecurity     Worry: Not on file     Inability: Not on file    Transportation needs     Medical: Not on file     Non-medical: Not on file   Tobacco Use  Smoking status: Former Smoker     Packs/day: 0.25     Types: Cigarettes     Quit date: 2015     Years since quittin.2    Smokeless tobacco: Never Used   Substance and Sexual Activity    Alcohol use: No    Drug use: No    Sexual activity: Not on file   Lifestyle    Physical activity     Days per week: Not on file     Minutes per session: Not on file    Stress: Not on file   Relationships    Social connections     Talks on phone: Not on file     Gets together: Not on file     Attends [employer] service: Not on file     Active member of club or organization: Not on file     Attends meetings of clubs or organizations: Not on file     Relationship status: Not on file    Intimate partner violence     Fear of current or ex partner: Not on file     Emotionally abused: Not on file     Physically abused: Not on file     Forced sexual activity: Not on file   Other Topics Concern    Not on file   Social History Narrative    Not on file     Family History   Problem Relation Age of Onset    High Blood Pressure Father     Cancer Other         GM     Allergies   Allergen Reactions    Latex Itching     Added based on information entered during case entry, please review and add reactions, type, and severity as needed    Pcn [Penicillins] Anaphylaxis    Poultry Meal Anaphylaxis    Amoxicillin Itching     pt finished 10 day course of Amoxicillin and proceeded to have itching to bilateral hands, arms, neck, and top of head without any visible rash    Tessalon [Benzonatate] Itching       PMH, Surgical Hx, Family Hx, and Social Hx reviewed and updated. Health Maintenance reviewed.     PHYSICAL EXAMINATION:  \"[x]\" Indicates a positive item  \"[]\" Indicates a negative item    Vital Signs: (As obtained by patient/caregiver or practitioner observation)    Blood pressure-  Heart rate-    Respiratory rate-    Temperature-  98.8 Pulse oximetry- Constitutional: [x] Appears well-developed and well-nourished [x] No apparent distress      [] Abnormal-   Mental status  [x] Alert and awake  [x] Oriented to person/place/time [x]Able to follow commands      Eyes:  EOM    []  Normal  [] Abnormal-  Sclera  [x]  Normal  [] Abnormal -         Discharge [x]  None visible  [] Abnormal -    HENT:   [x] Normocephalic, atraumatic. [] Abnormal   [x] Mouth/Throat: Mucous membranes are moist.     External Ears [x] Normal  [] Abnormal-     Neck: [x] No visualized mass     Pulmonary/Chest: [x] Respiratory effort normal.  [x] No visualized signs of difficulty breathing or respiratory distress        [] Abnormal-      Musculoskeletal:   [] Normal gait with no signs of ataxia         [x] Normal range of motion of neck        [] Abnormal-       Neurological:       [x] No Facial Asymmetry (Cranial nerve 7 motor function) (limited exam to video visit)          [x] No gaze palsy        [] Abnormal-         Skin:        [x] No significant exanthematous lesions or discoloration noted on facial skin         [] Abnormal-            Psychiatric:       [x] Normal Affect [x] No Hallucinations        [] Abnormal-     Other pertinent observable physical exam findings-   Results for orders placed or performed in visit on 09/28/20   Covid-19 Ambulatory   Result Value Ref Range    SARS-CoV-2 Not Detected Not Detected    Source Anterior nares        ASSESSMENT/PLAN:      Assessment & Plan   Diagnoses and all orders for this visit:    Close exposure to COVID-19 virus  -     COVID-19 Ambulatory; Future    Acute viral syndrome  -     COVID-19 Ambulatory;  Future      Orders Placed This Encounter   Procedures    COVID-19 Ambulatory     Standing Status:   Future     Number of Occurrences:   1     Standing Expiration Date:   12/17/2021     Scheduling Instructions:      Saline media preferred given current shortage of viral transport media but both acceptable

## 2020-12-18 ENCOUNTER — APPOINTMENT (OUTPATIENT)
Dept: PHYSICAL THERAPY | Age: 30
End: 2020-12-18
Payer: COMMERCIAL

## 2020-12-18 LAB
SARS-COV-2: NOT DETECTED
SOURCE: NORMAL

## 2020-12-23 ENCOUNTER — HOSPITAL ENCOUNTER (OUTPATIENT)
Dept: PHYSICAL THERAPY | Age: 30
Setting detail: THERAPIES SERIES
Discharge: HOME | End: 2020-12-23
Payer: COMMERCIAL

## 2020-12-23 PROCEDURE — 97110 THERAPEUTIC EXERCISES: CPT

## 2020-12-23 NOTE — PROGRESS NOTES
[address] [address]  Outpatient Physical Therapy    Treatment Note        Date: 2020  Patient: Ramirez Jacob  : 1990  ACCT #: [de-identified]  Referring Practitioner: Edie Gann  Diagnosis: ACL tear, MMT    Visit Information:  PT Visit Information  Onset Date: 10/02/20  PT Insurance Information: [address]  Total # of Visits Approved: 20  Total # of Visits to Date: 17  No Show: 0  Canceled Appointment: 1  Progress Note Counter:  (HARD MAX at 20 visits)    Subjective: Pt denies pain this date. Pt reports improved leonardo to stretches w/ deec intensity. Pt states he's been really busy at work this week and has noticed inc fatigue in R>L LE. Pt reports having a \"hard knot\" to lateral knee cap when he flexes his quad. Pt states this is not painful. Comments: RTD (2021)  HEP Compliance:  [] Good [] Fair [] Poor [] Reports not doing due to:    Vital Signs  Patient Currently in Pain: Denies   Pain Screening  Patient Currently in Pain: Denies    OBJECTIVE:   Exercises  Exercise 1: Bike, seat L4 5 min  Exercise 3: leg press in gym 1x10 @ 80#, 1x10 @ 100#, 1x10 @ 120#  Exercise 4: goblet squat variation w/ DB low 20# x10  Exercise 5: step ups F/L 10'' (metal step) x10 ea w/ pt holding 2 8# DB's  Exercise 6: split squats x10 b/l  Exercise 8: CC walkouts 7 plates 4-way x5 ea - occ LOB during lateral movements, but pt able to self correct  Exercise 9: pistol squats w/ int UE support x10 b/l (dec ROM for improved stability/tolerance  Exercise 11: stool scoot w/ b/l LE's 2x25ft, RLE only 2x25ft    Strength: [x] NT  [] MMT completed:    ROM: [x] NT  [] ROM measurements:     Manual:   Manual therapy  Other: Palpated along pt's lateral knee cap while quad flexed and relaxed. Palpable knot felt more prominent when quad flexed, however pt reports no pain associated with this knot. Assume small build up of scar tissue.     *Indicates exercise, modality, or manual techniques to be initiated when appropriate    Assessment: Body structures, Functions, Activity limitations: Decreased functional mobility , Decreased ADL status, Decreased ROM, Decreased strength, Decreased balance, Increased pain, Decreased high-level IADLs, Decreased endurance  Assessment: Initiated multiple ex's this date for cont'd strength of RLE. Pt mostly challenged w/ addition of stool scoots w/ min discomfort in distal R HS, though pt states it went away quickly. Pt demo's min instability of R knee w/ inc depth during pistol squats, therefore dec ROM of squat for improved leonardo and tracking of R knee. Treatment Diagnosis: decreased R knee AROM, decreased R LE strength, impaired gait and functional mobility, and decreased functional activity tolerance  Prognosis: Excellent       Goals:  Short term goals  Time Frame for Short term goals: 4- 5 weeks  Short term goal 1: The pt will report decreased R knee pain </= 2/10 at worst to increase ease with ADL's  Short term goal 2: The patient will ambulate >/=350 ft independently without AD in order to safely ambulate in the community    Long term goals  Time Frame for Long term goals : 10 weeks  Long term goal 1: The pt will demo improved R LE strength 5/5 in order to safely ambulate unlimited distances without AD indep at PLOF  Long term goal 2: The pt will demo improved R knee AROM >/=0-130* in order to perform stairs recip indep at PLOF  Long term goal 3: The pt will have improvement in LEFS score >/=70/80 in order to return to PLOF(updated 11/13/20)  Long term goal 4: The pt will demo improved R SLS >/=30 seconds to increase ease with ambulation  Long term goal 5: The pt will be indep/compliant with HEP in order to self manage symptoms upon D/C  Progress toward goals: inc strength, rom    POST-PAIN       Pain Rating (0-10 pain scale):  \"I'm a little sore. \" /10   Location and pain description same as pre-treatment unless indicated.    Action: [] NA   [x] Perform HEP  [] Meds as prescribed  [] Modalities as prescribed   [] Call Physician     Frequency/Duration:  Plan  Times per week: 1-2  Plan weeks: 10  Current Treatment Recommendations: Strengthening, ROM, Balance Training, Neuromuscular Re-education, Manual Therapy - Soft Tissue Mobilization, Manual Therapy - Joint Manipulation, Gait Training, Transfer Training, Stair training, Patient/Caregiver Education & Training, Safety Education & Training, Home Exercise Program, Aquatics, Equipment Evaluation, Education, & procurement, Modalities, Positioning  Plan Comment: pt decreased to 1x/wk d/t ins limitation     Pt to continue current HEP. See objective section for any therapeutic exercise changes, additions or modifications this date.     PT Individual Minutes  Time In: 0419  Time Out: 2388  Minutes: 39  Timed Code Treatment Minutes: 39 Minutes  Procedure Minutes: 0     Timed Activity Minutes Units   Ther Ex 39 3       Signature:  Electronically signed by Sunita Ron PTA on 12/23/20 at 5:37 PM EST

## 2020-12-30 ENCOUNTER — HOSPITAL ENCOUNTER (OUTPATIENT)
Dept: PHYSICAL THERAPY | Age: 30
Setting detail: THERAPIES SERIES
Discharge: HOME | End: 2020-12-30
Payer: COMMERCIAL

## 2020-12-30 PROCEDURE — 97110 THERAPEUTIC EXERCISES: CPT

## 2020-12-30 NOTE — PROGRESS NOTES
[address] [address]  Outpatient Physical Therapy    Treatment Note        Date: 2020  Patient: Saurabh Méndez  : 1990  ACCT #: [de-identified]  Referring Practitioner: Felipe Suárez  Diagnosis: ACL tear, MMT    Visit Information:  PT Visit Information  Onset Date: 10/02/20  PT Insurance Information: [address]  Total # of Visits Approved: 20  Total # of Visits to Date: 18  No Show: 0  Canceled Appointment: 1  Progress Note Counter:  (HARD MAX at 20 visits)    Subjective: Pt denies pain currently. Pt states he still gets patellar tendon pain w/ full knee ext sometimes. Comments: RTD (2021)  HEP Compliance:  [x] Good [] Fair [] Poor [] Reports not doing due to:    Vital Signs  Patient Currently in Pain: Denies   Pain Screening  Patient Currently in Pain: Denies    OBJECTIVE:   Exercises  Exercise 1: Octane bike in gym level 8 x5 mins  Exercise 3: leg press in gym 1x10 @ 80#, 1x10 @ 100#, 1x10 @ 120#, 1x10 140#  Exercise 4: goblet squat variation w/ DB low 20# x12  Exercise 5: step ups F/L 10'' (metal step) x12 ea w/ pt holding 2 8# DB's  Exercise 9: pistol squats w/ int UE support x10 b/l (dec ROM for improved stability/tolerance  Exercise 11: stool scoot w/ b/l LE's 2x25ft, RLE only 2x25ft  Exercise 12: HS curl in gym 2x10 50#, 1x10 60#  Exercise 13: leg ext in gym 2x10 10#, 1x10 20#    Strength: [x] NT  [] MMT completed:    ROM: [x] NT  [] ROM measurements:  *Indicates exercise, modality, or manual techniques to be initiated when appropriate    Assessment: Body structures, Functions, Activity limitations: Decreased functional mobility , Decreased ADL status, Decreased ROM, Decreased strength, Decreased balance, Increased pain, Decreased high-level IADLs, Decreased endurance  Assessment: Pt is 12.5 weeks post-op today demo'ing good progress within protocol. Initiated submax leg ext and HS curl on gym equipment w/o c/o inc pain.  Pt demo's mild instability w/ pistol squats, especially during concentric control. Pt would benefit from remaining visits on POC to further progress strength and gain confidence to return to hobbies of working on various tasks around the house that include heavy lifting. Treatment Diagnosis: decreased R knee AROM, decreased R LE strength, impaired gait and functional mobility, and decreased functional activity tolerance  Prognosis: Excellent     Goals:  Short term goals  Time Frame for Short term goals: 4- 5 weeks  Short term goal 1: The pt will report decreased R knee pain </= 2/10 at worst to increase ease with ADL's  Short term goal 2: The patient will ambulate >/=350 ft independently without AD in order to safely ambulate in the community  Long term goals  Time Frame for Long term goals : 10 weeks  Long term goal 1: The pt will demo improved R LE strength 5/5 in order to safely ambulate unlimited distances without AD indep at PLOF  Long term goal 2: The pt will demo improved R knee AROM >/=0-130* in order to perform stairs recip indep at PLOF  Long term goal 3: The pt will have improvement in LEFS score >/=70/80 in order to return to PLOF(updated 11/13/20)  Long term goal 4: The pt will demo improved R SLS >/=30 seconds to increase ease with ambulation  Long term goal 5: The pt will be indep/compliant with HEP in order to self manage symptoms upon D/C  Progress toward goals: inc strength, rom    POST-PAIN       Pain Rating (0-10 pain scale):  0 /10   Location and pain description same as pre-treatment unless indicated.    Action: [] NA   [x] Perform HEP  [] Meds as prescribed  [] Modalities as prescribed   [] Call Physician     Frequency/Duration:  Plan  Times per week: 1-2  Plan weeks: 10  Current Treatment Recommendations: Strengthening, ROM, Balance Training, Neuromuscular Re-education, Manual Therapy - Soft Tissue Mobilization, Manual Therapy - Joint Manipulation, Gait Training, Transfer Training, Stair training, Patient/Caregiver Education & Training, Safety Education & Training, Home Exercise Program, Aquatics, Equipment Evaluation, Education, & procurement, Modalities, Positioning  Plan Comment: pt decreased to 1x/wk d/t ins limitation     Pt to continue current HEP. See objective section for any therapeutic exercise changes, additions or modifications this date.          PT Individual Minutes  Time In: 1300  Time Out: 7539  Minutes: 40  Timed Code Treatment Minutes: 40 Minutes  Procedure Minutes:0     Timed Activity Minutes Units   Ther Ex 40 3       Signature:  Electronically signed by Damon Kay PTA on 12/30/20 at 12:51 PM EST

## 2021-01-04 NOTE — PROGRESS NOTES
[address]       Physical Therapy  Cancellation/No-show Note  Patient Name:  Shari Nguyen  :  1990   Date:  2021          Visit Information:  PT Visit Information  Onset Date: 10/02/20  PT Insurance Information: [address]  Total # of Visits Approved: 20  Total # of Visits to Date: 18  No Show: 0  Canceled Appointment: 2  Progress Note Counter:  (HARD MAX at 20 visits) CX 2020    For today's appointment patient:  [x]  Cancelled  []  Rescheduled appointment  []  No-show   []  Called pt to remind of next appointment     Reason given by patient:  []  Patient ill  []  Conflicting appointment  []  No transportation    []  Conflict with work  []  No reason given  [x]  Other: pt quarantining to see  niece.      Signature: Electronically signed by Autsin Benites PTA on 21 at 3:54 PM EST

## 2021-01-06 ENCOUNTER — HOSPITAL ENCOUNTER (OUTPATIENT)
Dept: PHYSICAL THERAPY | Age: 31
Setting detail: THERAPIES SERIES
Discharge: HOME | End: 2021-01-06
Payer: COMMERCIAL

## 2021-01-13 ENCOUNTER — HOSPITAL ENCOUNTER (OUTPATIENT)
Dept: PHYSICAL THERAPY | Age: 31
Setting detail: THERAPIES SERIES
Discharge: HOME | End: 2021-01-13
Payer: COMMERCIAL

## 2021-01-13 PROCEDURE — 97110 THERAPEUTIC EXERCISES: CPT

## 2021-01-13 PROCEDURE — 97035 APP MDLTY 1+ULTRASOUND EA 15: CPT

## 2021-01-20 ENCOUNTER — HOSPITAL ENCOUNTER (OUTPATIENT)
Dept: PHYSICAL THERAPY | Age: 31
Setting detail: THERAPIES SERIES
Discharge: HOME | End: 2021-01-20
Payer: COMMERCIAL

## 2021-01-20 PROCEDURE — 97112 NEUROMUSCULAR REEDUCATION: CPT

## 2021-01-20 PROCEDURE — 97140 MANUAL THERAPY 1/> REGIONS: CPT

## 2021-01-20 PROCEDURE — 97035 APP MDLTY 1+ULTRASOUND EA 15: CPT

## 2021-01-20 NOTE — PROGRESS NOTES
[address] robert, [address]     Ph: [phone]  Fax: [phone]    [] Certification  [] Recertification []  Plan of Care  [x] Progress Note [] Discharge      To:  Cammiekatie Pritchett      From:  [place] Jorge PT, DPT  Patient: Avery Class     : 1990  Diagnosis: ACL tear, MMT     Date: 2021  Treatment Diagnosis: decreased R knee AROM, decreased R LE strength, impaired gait and functional mobility, and decreased functional activity tolerance     Progress Report Period from:  2020  to 2021    Total # of Visits to Date: 20   No Show: 0    Canceled Appointment: 2     OBJECTIVE:   Long Term Goals - Time Frame for Long term goals : 10 weeks  Goals Current/ Discharge status Met   Long term goal 1: The pt will demo improved R LE strength 5/5 in order to safely ambulate unlimited distances without AD indep at PLOF Strength RLE  Strength RLE: Exception  R Hip Flexion: 4+/5  R Hip Extension: 5/5  R Hip ABduction: 5/5  R Hip ADduction: 4+/5  R Hip Internal Rotation: 5/5  R Hip External Rotation: 5/5  R Knee Flexion: 4+/5  R Knee Extension: 4+/5  R Ankle Dorsiflexion: 5/5  R Ankle Plantar flexion: 5/5  Pt able to ambulate unlimited distances w/o AD, though must wear brace per MD. Pt reports pain w/ R knee ext during gait cycle around patellar tendon. [x] yes  [x] no   Long term goal 2: The pt will demo improved R knee AROM >/=0-130* in order to perform stairs recip indep at PLOF AROM RLE (degrees)  RLE General AROM: knee 0-122 in supine  Pt able to ascend/descend stairs reciprocally [x] yes  [x] no   Long term goal 3: The pt will have improvement in LEFS score >/=70/80 in order to return to PLOF(updated 20) LEFS 57/80 [] yes  [x] no   Long term goal 4: The pt will demo improved R SLS >/=30 seconds to increase ease with ambulation R SLS >/=30 seconds [x] yes  [] no   Long term goal 5:  The pt will be indep/compliant with HEP in order to self manage symptoms upon D/C Ongoing, indep/compliant w/ current [x] yes  [x] no    Long Term goal 6: The pt will be able to lift >/=25# without pain in R knee >2/10 in order to perform heavy houshehold activities and duties at [facility] Pain 5/10 at worst with contd difficulty performing heavy household duties  [] yes  [x] no      Body structures, Functions, Activity limitations: Decreased functional mobility , Decreased ADL status, Decreased ROM, Decreased strength, Decreased balance, Increased pain, Decreased high-level IADLs, Decreased endurance  Assessment: Pt demo's good progress towards goals w/ limitations still being knee flex/ext and R hip flexor strength. Pt overall reports pain ranging from 0-5/10 w/ most pain noted in patellar tendon after long periods of standing, heavy household activities, and during R knee ext in gait cycle. Pt demo's good stability of R knee w/ >/=30'' SLS w/o significant frontal plane instability. Pt would benefit from cont'd PT to further progress towards remaining deficits and return to relatively pain free activity level. Prognosis: Excellent  Discharge Recommendations: Continue to assess pending progress    PLAN:   Frequency/Duration:  Plan  Times per week: 1  Plan weeks: 4-5 additional  Current Treatment Recommendations: Strengthening, ROM, Balance Training, Neuromuscular Re-education, Manual Therapy - Soft Tissue Mobilization, Manual Therapy - Joint Manipulation, Gait Training, Transfer Training, Stair training, Patient/Caregiver Education & Training, Safety Education & Training, Home Exercise Program, Aquatics, Equipment Evaluation, Education, & procurement, Modalities, Positioning     Precautions: Pt to wear R knee brace during activity outside of PT per MD                        Patient Status:[x] Continue/ Initiate plan of Care    [] Discharge PT. Recommend pt continue with HEP.      [] Additional visits requested, Please re-certify for additional visits:        Signature: Electronically signed by Beto Miranda PT on 1/25/2021 at 8:15 AM  Objective Measures Obtained By: Electronically signed by Everardo Hampton PTA on 1/20/21 at 5:53 PM EST    If you have any questions or concerns, please don't hesitate to call. Thank you for your referral.    I have reviewed this plan of care and certify a need for medically necessary rehabilitation services.     Physician Signature:__________________________________________________________  Date:  Please sign and return

## 2021-01-20 NOTE — PROGRESS NOTES
seconds to increase ease with ambulation  Long term goal 5: The pt will be indep/compliant with HEP in order to self manage symptoms upon D/C  Progress toward goals: see PN    POST-PAIN       Pain Rating (0-10 pain scale):   0/10   Location and pain description same as pre-treatment unless indicated. Action: [] NA   [x] Perform HEP  [] Meds as prescribed  [] Modalities as prescribed   [] Call Physician     Frequency/Duration:  Plan  Times per week: 1-2  Plan weeks: 10  Current Treatment Recommendations: Strengthening, ROM, Balance Training, Neuromuscular Re-education, Manual Therapy - Soft Tissue Mobilization, Manual Therapy - Joint Manipulation, Gait Training, Transfer Training, Stair training, Patient/Caregiver Education & Training, Safety Education & Training, Home Exercise Program, Aquatics, Equipment Evaluation, Education, & procurement, Modalities, Positioning     Pt to continue current HEP. See objective section for any therapeutic exercise changes, additions or modifications this date.          PT Individual Minutes  Time In: 1701  Time Out: 8057  Minutes: 44  Timed Code Treatment Minutes: 44 Minutes  Procedure Minutes:0     Timed Activity Minutes Units   NMR 26 1   Manual  10 1   US 8 1       Signature:  Electronically signed by Austin Benites PTA on 1/20/21 at 5:51 PM EST

## 2021-01-25 ENCOUNTER — HOSPITAL ENCOUNTER (OUTPATIENT)
Dept: PHYSICAL THERAPY | Age: 31
Setting detail: THERAPIES SERIES
Discharge: HOME | End: 2021-01-25
Payer: COMMERCIAL

## 2021-01-25 PROCEDURE — 97035 APP MDLTY 1+ULTRASOUND EA 15: CPT

## 2021-01-25 PROCEDURE — 97140 MANUAL THERAPY 1/> REGIONS: CPT

## 2021-01-25 PROCEDURE — 97110 THERAPEUTIC EXERCISES: CPT

## 2021-01-25 NOTE — PROGRESS NOTES
[id number] [address]  Outpatient Physical Therapy    Treatment Note        Date: 2021  Patient: Andreina Interiano  : 1990  ACCT #: [de-identified]  Referring Practitioner: Patricia Chavez  Diagnosis: ACL tear, MMT    Visit Information:  PT Visit Information  Onset Date: 10/02/20  PT Insurance Information: [address]  Total # of Visits Approved: 20  Total # of Visits to Date: 21  No Show: 0  Canceled Appointment: 2  Progress Note Counter: -    Subjective: Pt reports 1 instance of medial \"shock\" while driving that happened, then immediately went away. Pt reports relief after US and cupping, but after sitting for about an hour and getting up, the pain returned, but has not been as intense. Comments: RTD 2-3 months  HEP Compliance:  [x] Good [] Fair [] Poor [] Reports not doing due to:    Vital Signs  Patient Currently in Pain: Denies   Pain Screening  Patient Currently in Pain: Denies    OBJECTIVE:   Exercises  Exercise 2: stiff leg RDL w/ cane placed behind back for alignment x10  Exercise 14: hip thrusts x10 w/o wt to focus on form  Exercise 15: DB swings 15# x10  Exercise 16: step downs w/ constant quad tension w/ heel tap fwd 4'' x10  Exercise 20: HEP: step downs    Strength: [x] NT  [] MMT completed:       ROM: [x] NT  [] ROM measurements:     Manual:   Manual therapy  Other: MFD cupping to R patellar tendon and slightly below for pain control w/ gliding technique, followed by 4 static cups x3 mins, then 4 static cups w/ ambulation around dept w/ pt denying any pain during ambulation; total manual time 10 mins    Modalities:  Modalities  Ultrasound: US to patellar tendon w/ and w/o patellar tendon mobs x6 mins +2 for setup/clean up, 50%, 3mghz, 1.0w/cm2     *Indicates exercise, modality, or manual techniques to be initiated when appropriate    Assessment:    Body structures, Functions, Activity limitations: Decreased functional mobility , Decreased ADL status, Decreased ROM, Decreased strength, Decreased balance, Increased pain, Decreased high-level IADLs, Decreased endurance  Assessment: Pt cont to respond well to US and cupping techniques w/ dec pain reported during gait cycle after. Initiated multiple HS/glute focus ex's in addition to quad step downs w/ constant tension/heel tap w/o c/o inc pain. Pt expressed he would like to trial jogging in clinic to ensure proper technique just in case he has to do it one day, therefore educated pt on bringing running shoes NV (pt wearing Vans today) to trial jogging as tolerated w/ pt agreeable. Treatment Diagnosis: decreased R knee AROM, decreased R LE strength, impaired gait and functional mobility, and decreased functional activity tolerance  Prognosis: Excellent     Goals:  Short term goals  Time Frame for Short term goals: 4- 5 weeks  Short term goal 1: The pt will report decreased R knee pain </= 2/10 at worst to increase ease with ADL's  Short term goal 2: The patient will ambulate >/=350 ft independently without AD in order to safely ambulate in the community  Long term goals  Time Frame for Long term goals : 10 weeks  Long term goal 1: The pt will demo improved R LE strength 5/5 in order to safely ambulate unlimited distances without AD indep at PLOF  Long term goal 2: The pt will demo improved R knee AROM >/=0-130* in order to perform stairs recip indep at PLOF  Long term goal 3: The pt will have improvement in LEFS score >/=70/80 in order to return to PLOF(updated 11/13/20)  Long term goal 4: The pt will demo improved R SLS >/=30 seconds to increase ease with ambulation  Long term goal 5:  The pt will be indep/compliant with HEP in order to self manage symptoms upon D/C  Long term goal 6: The pt will be able to lift >/=25# without pain in R knee >2/10 in order to perform heavy houshehold activities and duties at [facility]  Progress toward goals: inc strength, rom, dec pain    POST-PAIN       Pain Rating (0-10 pain scale):  0 /10   Location and pain description same as pre-treatment unless indicated. Action: [] NA   [x] Perform HEP  [] Meds as prescribed  [] Modalities as prescribed   [] Call Physician     Frequency/Duration:  Plan  Times per week: 1  Plan weeks: 4-5 additional  Current Treatment Recommendations: Strengthening, ROM, Balance Training, Neuromuscular Re-education, Manual Therapy - Soft Tissue Mobilization, Manual Therapy - Joint Manipulation, Gait Training, Transfer Training, Stair training, Patient/Caregiver Education & Training, Safety Education & Training, Home Exercise Program, Aquatics, Equipment Evaluation, Education, & procurement, Modalities, Positioning     Pt to continue current HEP. See objective section for any therapeutic exercise changes, additions or modifications this date.          PT Individual Minutes  Time In: 7735  Time Out: 7999  Minutes: 43  Timed Code Treatment Minutes: 43 Minutes  Procedure Minutes:0     Timed Activity Minutes Units   Ther Ex 25 1   Manual  10 1   US 8 1       Signature:  Electronically signed by Mordechai Duane, PTA on 1/25/21 at 5:42 PM EST

## 2021-01-27 ENCOUNTER — APPOINTMENT (OUTPATIENT)
Dept: PHYSICAL THERAPY | Age: 31
End: 2021-01-27
Payer: COMMERCIAL

## 2021-02-03 ENCOUNTER — HOSPITAL ENCOUNTER (OUTPATIENT)
Dept: PHYSICAL THERAPY | Age: 31
Setting detail: THERAPIES SERIES
Discharge: HOME | End: 2021-02-03
Payer: COMMERCIAL

## 2021-02-10 ENCOUNTER — HOSPITAL ENCOUNTER (OUTPATIENT)
Dept: PHYSICAL THERAPY | Age: 31
Setting detail: THERAPIES SERIES
Discharge: HOME | End: 2021-02-10
Payer: COMMERCIAL

## 2021-02-10 PROCEDURE — 97035 APP MDLTY 1+ULTRASOUND EA 15: CPT

## 2021-02-10 PROCEDURE — 97110 THERAPEUTIC EXERCISES: CPT

## 2021-02-10 NOTE — PROGRESS NOTES
[address] [address]  Outpatient Physical Therapy    Treatment Note        Date: 2/10/2021  Patient: Gale Jauregui  : 1990  ACCT #: [de-identified]  Referring Practitioner: Zahida Moreira  Diagnosis: ACL tear, MMT    Visit Information:  PT Visit Information  Onset Date: 10/02/20  PT Insurance Information: [address]  Total # of Visits Approved: 20  Total # of Visits to Date: 22  No Show: 0  Canceled Appointment: 3  Progress Note Counter: -    Subjective: Pt reports cont'd pain while walking. Pt reports pain can increase to 6-7/10 primarily while at work and pt feels this may be d/t wearing steel-toed boots. HEP Compliance:  [x] Good [] Fair [] Poor [] Reports not doing due to:    Vital Signs  Patient Currently in Pain: Denies   Pain Screening  Patient Currently in Pain: Denies    OBJECTIVE:   Exercises  Exercise 1: dynamic warm up: high knees, butt kicks, HS kicks, ice skaters, skipping 40ft x4 ea  Exercise 2: alter-G, L12, pant XL, 80% WB'ing, warm up walk 3.0-3.5mph x3'; jog 4-4. 5mph x3.5'; cool down walk 3.0mph x2.5'  Exercise 3: calf stretch at steps 30''x3  Exercise 4: broad jump at wall 20''x3  Exercise 5: dbl leg depth jump off 6'' step w/ soft landing x10 - VC's for = WB'ing    Strength: [x] NT  [] MMT completed:     ROM: [x] NT    Modalities:  Modalities  Ultrasound: US to patellar tendon w/ and w/o patellar tendon mobs x6 mins +2 for setup/clean up, 50%, 3mghz, 1.0w/cm2     *Indicates exercise, modality, or manual techniques to be initiated when appropriate    Assessment: Body structures, Functions, Activity limitations: Decreased functional mobility , Decreased ADL status, Decreased ROM, Decreased strength, Decreased balance, Increased pain, Decreased high-level IADLs, Decreased endurance  Assessment: Initiated jogging and various dynamic hopping/jumping movements w/ brace donned w/ good overall leonardo demo'd by pt.  Pt denied inc in pain, however reported inc R calf burning during jog that dec w/ R calf stretch. Pt observed to slightly over-supinate during gait and while jogging in [place]. Pt pleased w/ ability to jog and hop w/o pain and expressed inc confidence now. Will consider trialing w/o brace NV and progress therex as leonardo. Treatment Diagnosis: decreased R knee AROM, decreased R LE strength, impaired gait and functional mobility, and decreased functional activity tolerance  Prognosis: Excellent     Goals:  Short term goals  Time Frame for Short term goals: 4- 5 weeks  Short term goal 1: The pt will report decreased R knee pain </= 2/10 at worst to increase ease with ADL's  Short term goal 2: The patient will ambulate >/=350 ft independently without AD in order to safely ambulate in the community  Long term goals  Time Frame for Long term goals : 10 weeks  Long term goal 1: The pt will demo improved R LE strength 5/5 in order to safely ambulate unlimited distances without AD indep at PLOF  Long term goal 2: The pt will demo improved R knee AROM >/=0-130* in order to perform stairs recip indep at PLOF  Long term goal 3: The pt will have improvement in LEFS score >/=70/80 in order to return to PLOF(updated 11/13/20)  Long term goal 4: The pt will demo improved R SLS >/=30 seconds to increase ease with ambulation  Long term goal 5: The pt will be indep/compliant with HEP in order to self manage symptoms upon D/C  Long term goal 6: The pt will be able to lift >/=25# without pain in R knee >2/10 in order to perform heavy houshehold activities and duties at [facility]  Progress toward goals: inc strength, rom, dec pain    POST-PAIN       Pain Rating (0-10 pain scale):   0/10   Location and pain description same as pre-treatment unless indicated.    Action: [] NA   [x] Perform HEP  [] Meds as prescribed  [] Modalities as prescribed   [] Call Physician     Frequency/Duration:  Plan  Times per week: 1  Plan weeks: 4-5 additional  Current Treatment Recommendations: Strengthening, ROM, Balance Training,

## 2021-02-24 ENCOUNTER — HOSPITAL ENCOUNTER (OUTPATIENT)
Dept: PHYSICAL THERAPY | Age: 31
Setting detail: THERAPIES SERIES
Discharge: HOME | End: 2021-02-24
Payer: COMMERCIAL

## 2021-02-24 PROCEDURE — 97035 APP MDLTY 1+ULTRASOUND EA 15: CPT

## 2021-02-24 PROCEDURE — 97112 NEUROMUSCULAR REEDUCATION: CPT

## 2021-02-24 PROCEDURE — 97140 MANUAL THERAPY 1/> REGIONS: CPT

## 2021-02-24 NOTE — PROGRESS NOTES
[address] [address]  Outpatient Physical Therapy    Treatment Note        Date: 2021  Patient: Gloria Minor  : 1990  ACCT #: [de-identified]  Referring Practitioner: Cinda Giordano  Diagnosis: ACL tear, MMT    Visit Information:  PT Visit Information  Onset Date: 10/02/20  PT Insurance Information: [address]  Total # of Visits Approved: 20  Total # of Visits to Date: 23  No Show: 0  Canceled Appointment: 3  Progress Note Counter: 3/4-    Subjective: Pt reports cont'd pain at ant knee w/ gait, though improving slowly. Pt states he had inc achiness last night and random medial knee pain earlier today, but no pain now. Pt reports he was really sore after LV for a few days. Pt inquiring about need to wear brace as MD told pt \"wear as needed out and about. \"     HEP Compliance:  [x] Good [] Fair [] Poor [] Reports not doing due to:    Vital Signs  Patient Currently in Pain: Denies   Pain Screening  Patient Currently in Pain: Denies    OBJECTIVE:   Exercises  Exercise 19: education re: brace, wearing compression sleeves, etc. that pt had  Exercise 20: reviewed HEP, discussed frequency, adding jumping ex's w/o brace, returning to gym and incorporating HEP ex's into gym    Strength: [x] NT  [] MMT completed:     ROM: [x] NT  [] ROM measurements:     Manual:   Manual therapy  Other: MFD cupping to R patellar tendon and slightly below for pain control w/ gliding technique, followed by 4 static cups x3 mins, then 4 static cups w/ ambulation around dept w/ pt denying any pain during ambulation though stated the medial cup felt \"tender\"; total manual time 10 mins    Modalities:  Modalities  Ultrasound: US to patellar tendon w/ and w/o patellar tendon mobs x6 mins +2 for setup/clean up, 50%, 3mghz, 1.0w/cm2     *Indicates exercise, modality, or manual techniques to be initiated when appropriate    Assessment:    Body structures, Functions, Activity limitations: Decreased functional mobility , Decreased ADL status, Decreased ROM, Decreased strength, Decreased balance, Increased pain, Decreased high-level IADLs, Decreased endurance  Assessment: Large amount of tx session spent educating pt on brace frequency, duration, compression sleeves, discussing/reviewing HEP and returning to gym. After discussing pt's concerns w/ wearing brace, it is possible brace could be contributing to anterior knee pain during gait. Encouraged pt to pay close attention to his pain while wearing brace vs not wearing brace during daily activities. Pt expresses inc confidence after reviewing gait, jumping, and strengthening therex this far. Treatment Diagnosis: decreased R knee AROM, decreased R LE strength, impaired gait and functional mobility, and decreased functional activity tolerance  Prognosis: Excellent     Goals:  Short term goals  Time Frame for Short term goals: 4- 5 weeks  Short term goal 1: The pt will report decreased R knee pain </= 2/10 at worst to increase ease with ADL's  Short term goal 2: The patient will ambulate >/=350 ft independently without AD in order to safely ambulate in the community  Long term goals  Time Frame for Long term goals : 10 weeks  Long term goal 1: The pt will demo improved R LE strength 5/5 in order to safely ambulate unlimited distances without AD indep at PLOF  Long term goal 2: The pt will demo improved R knee AROM >/=0-130* in order to perform stairs recip indep at PLOF  Long term goal 3: The pt will have improvement in LEFS score >/=70/80 in order to return to PLOF(updated 11/13/20)  Long term goal 4: The pt will demo improved R SLS >/=30 seconds to increase ease with ambulation  Long term goal 5:  The pt will be indep/compliant with HEP in order to self manage symptoms upon D/C  Long term goal 6: The pt will be able to lift >/=25# without pain in R knee >2/10 in order to perform heavy houshehold activities and duties at [facility]  Progress toward goals: inc strength, rom, improve gait, dec pain    POST-PAIN       Pain Rating (0-10 pain scale):   0/10   Location and pain description same as pre-treatment unless indicated. Action: [] NA   [x] Perform HEP  [] Meds as prescribed  [] Modalities as prescribed   [] Call Physician     Frequency/Duration:  Plan  Times per week: 1  Plan weeks: 4-5 additional  Current Treatment Recommendations: Strengthening, ROM, Balance Training, Neuromuscular Re-education, Manual Therapy - Soft Tissue Mobilization, Manual Therapy - Joint Manipulation, Gait Training, Transfer Training, Stair training, Patient/Caregiver Education & Training, Safety Education & Training, Home Exercise Program, Aquatics, Equipment Evaluation, Education, & procurement, Modalities, Positioning     Pt to continue current HEP. See objective section for any therapeutic exercise changes, additions or modifications this date.     PT Individual Minutes  Time In: 8346  Time Out: 1740  Minutes: 38  Timed Code Treatment Minutes: 38 Minutes  Procedure Minutes: 0     Timed Activity Minutes Units   NMR (pt edu) 20 1   Manual  10 1   US 8 1       Signature:  Electronically signed by Tammy Eid PTA on 2/24/21 at 5:44 PM EST

## 2021-03-03 ENCOUNTER — HOSPITAL ENCOUNTER (OUTPATIENT)
Dept: PHYSICAL THERAPY | Age: 31
Setting detail: THERAPIES SERIES
Discharge: HOME | End: 2021-03-03
Payer: COMMERCIAL

## 2021-03-03 PROCEDURE — 97110 THERAPEUTIC EXERCISES: CPT

## 2021-03-03 PROCEDURE — 97035 APP MDLTY 1+ULTRASOUND EA 15: CPT

## 2021-03-03 NOTE — PROGRESS NOTES
[address] [address]  Outpatient Physical Therapy    Treatment Note        Date: 3/3/2021  Patient: Sho Horn  : 1990  ACCT #: [de-identified]  Referring Practitioner: Bianka Mosley  Diagnosis: ACL tear, MMT    Visit Information:  PT Visit Information  Onset Date: 10/02/20  PT Insurance Information: [address]  Total # of Visits Approved: 20  Total # of Visits to Date: 24  No Show: 0  Canceled Appointment: 3  Progress Note Counter: -    Subjective: Pt reports \"I can't believe how much better it feels now that I'm not wearing the brace really. \" Pt reports pain is there minimally at worse -3/10 vs 7/10. Pt reports he wore it when it was icy outside and during jumping ex's at home. Comments: RTD 3/24/21  HEP Compliance:  [x] Good [] Fair [] Poor [] Reports not doing due to:    Vital Signs  Patient Currently in Pain: Denies   Pain Screening  Patient Currently in Pain: Denies    OBJECTIVE:   Exercises  Exercise 3: calf stretch at steps 30''x3  Exercise 4: dbl leg broad jump at wall 20''x3; RLE SL hop x10 w/o brace  Exercise 5: dbl leg depth jump off 8'' step w/ soft landing x10 w/o brace; single leg depth jump from 8'' step w/ soft landing x10  Exercise 20: cont to discuss HEP frequency w/ pt, primarily post-D/C. Pt plans to get gym membership to facility and incorporate swimming laps into workout (pt used to be a swimmer). Will administer detailed gym HEP NV for pt to focus on scheduled leg day as pt expresses concern not knowing when and what he should do. Strength: [x] NT  [] MMT completed:    ROM: [x] NT  [] ROM measurements:     Modalities:  Modalities  Ultrasound: US to patellar tendon w/ and w/o patellar tendon mobs x6 mins +2 for setup/clean up, 50%, 3mghz, 1.0w/cm2     *Indicates exercise, modality, or manual techniques to be initiated when appropriate    Assessment:    Body structures, Functions, Activity limitations: Decreased functional mobility , Decreased ADL status, Decreased ROM, Decreased strength, Decreased balance, Increased pain, Decreased high-level IADLs, Decreased endurance  Assessment: Performed dbl and single leg jumping ex's w/o brace to assess pt response and boost pt confidence as pt expresses mental block performing jumping takss w/o brace. Pt able to perform ea ex well w/o inc in pain and overall good stability observed. Pt also anxious about returning to gym and having a sound routine for legs to perform regularly, therefore will prepare gym-based HEP for pt at [address]. Treatment Diagnosis: decreased R knee AROM, decreased R LE strength, impaired gait and functional mobility, and decreased functional activity tolerance  Prognosis: Excellent     Goals:  Short term goals  Time Frame for Short term goals: 4- 5 weeks  Short term goal 1: The pt will report decreased R knee pain </= 2/10 at worst to increase ease with ADL's  Short term goal 2: The patient will ambulate >/=350 ft independently without AD in order to safely ambulate in the community  Long term goals  Time Frame for Long term goals : 10 weeks  Long term goal 1: The pt will demo improved R LE strength 5/5 in order to safely ambulate unlimited distances without AD indep at PLOF  Long term goal 2: The pt will demo improved R knee AROM >/=0-130* in order to perform stairs recip indep at PLOF  Long term goal 3: The pt will have improvement in LEFS score >/=70/80 in order to return to PLOF(updated 11/13/20)  Long term goal 4: The pt will demo improved R SLS >/=30 seconds to increase ease with ambulation  Long term goal 5: The pt will be indep/compliant with HEP in order to self manage symptoms upon D/C  Long term goal 6: The pt will be able to lift >/=25# without pain in R knee >2/10 in order to perform heavy houshehold activities and duties at [facility]  Progress toward goals: inc strength, dec pain    POST-PAIN       Pain Rating (0-10 pain scale):   0/10   Location and pain description same as pre-treatment unless indicated. Action: [] NA   [x] Perform HEP  [] Meds as prescribed  [] Modalities as prescribed   [] Call Physician     Frequency/Duration:  Plan  Times per week: 1  Plan weeks: 4-5 additional  Specific instructions for Next Treatment: D/C NV; pt agreeable  Current Treatment Recommendations: Strengthening, ROM, Balance Training, Neuromuscular Re-education, Manual Therapy - Soft Tissue Mobilization, Manual Therapy - Joint Manipulation, Gait Training, Transfer Training, Stair training, Patient/Caregiver Education & Training, Safety Education & Training, Home Exercise Program, Aquatics, Equipment Evaluation, Education, & procurement, Modalities, Positioning     Pt to continue current HEP. See objective section for any therapeutic exercise changes, additions or modifications this date.       PT Individual Minutes  Time In: 0660  Time Out: 2933  Minutes: 30  Timed Code Treatment Minutes: 30 Minutes  Procedure Minutes:0     Timed Activity Minutes Units   Ther Ex 22 1   US 8 1       Signature:  Electronically signed by Gloria Pierce PTA on 3/3/21 at 5:05 PM EST

## 2021-03-10 ENCOUNTER — HOSPITAL ENCOUNTER (OUTPATIENT)
Dept: PHYSICAL THERAPY | Age: 31
Setting detail: THERAPIES SERIES
Discharge: HOME | End: 2021-03-10
Payer: COMMERCIAL

## 2021-03-10 PROCEDURE — 97110 THERAPEUTIC EXERCISES: CPT

## 2021-03-10 NOTE — DISCHARGE SUMMARY
[address]     Ph: [phone]  Fax: [phone]    [] Certification  [] Recertification []  Plan of Care  [] Progress Note [x] Discharge      To:  Angi Hoover      From:  Charmayne Carrie PT, DPT  Patient: Indu Fritz     : 1990  Diagnosis: ACL tear, MMT     Date: 3/10/2021  Treatment Diagnosis: decreased R knee AROM, decreased R LE strength, impaired gait and functional mobility, and decreased functional activity tolerance     Progress Report Period from:  2021  to 3/10/2021    Total # of Visits to Date: 25   No Show: 0    Canceled Appointment: 3     OBJECTIVE:   Long Term Goals - Time Frame for Long term goals : 10 weeks  Goals Current/ Discharge status Met   Long term goal 1: The pt will demo improved R LE strength 5/5 in order to safely ambulate unlimited distances without AD indep at PLOF Strength RLE  R Hip Flexion: 5/5  R Hip Extension: 5/5  R Hip ABduction: 5/5  R Hip ADduction: 5/5  R Hip Internal Rotation: 5/5  R Hip External Rotation: 5/5  R Knee Flexion: 5/5  R Knee Extension: 5/5  R Ankle Dorsiflexion: 5/5  R Ankle Plantar flexion: 5/5  Pt able to ambulate w/o AD or brace for unlimited distances w/o significant inc in pain [x] yes  [] no   Long term goal 2: The pt will demo improved R knee AROM >/=0-130* in order to perform stairs recip indep at PLOF AROM RLE (degrees)  RLE General AROM: knee 0-131 in supine  Pt able to ascend/descend steps reciprocally indep [x] yes  [] no   Long term goal 3: The pt will have improvement in LEFS score >/=70/80 in order to return to PLOF(updated 20) LEFS 68/80 [] yes  [x] no   Long term goal 4: The pt will demo improved R SLS >/=30 seconds to increase ease with ambulation R SLS >/=30 sec w/o instability [x] yes  [] no   Long term goal 5:  The pt will be indep/compliant with HEP in order to self manage symptoms upon D/C indep w/ home and gym HEP [x] yes  [] no    Long term goal 6: The pt will be able to lift >/=25# without pain in R knee >2/10 in order to perform heavy houshehold activities and duties at [employer] Pt able to lift >/=25# w/o difficulty or pain >2/10. X yes  [] no      Body structures, Functions, Activity limitations: Decreased functional mobility , Decreased ADL status, Decreased ROM, Decreased strength, Decreased balance, Increased pain, Decreased high-level IADLs, Decreased endurance  Assessment: Pt demo's good progress towards goals with significant reduction in patellar tendon pain since not wearing brace as often. Pt also demo's improved strength and ability to jog, hop, and return to gym independently. Pt denies any further needs or concerns at this time despite note meeting LEFS goal and will cont to utilize facility gym to self-manage sx's. Prognosis: Excellent  Discharge Recommendations: Continue to assess pending progress    PLAN:   Plan  Plan Comment: D/C                  Patient Status:[] Continue/ Initiate plan of Care    [x] Discharge PT. Recommend pt continue with HEP. [] Additional visits requested, Please re-certify for additional visits:        Signature: Electronically signed by Dennise Aguayo PT on 3/11/2021 at 7:50 AM  Objective Info By: Electronically signed by Qiana Wilson PTA on 3/10/21 at 1:04 PM EST    If you have any questions or concerns, please don't hesitate to call. Thank you for your referral.    I have reviewed this plan of care and certify a need for medically necessary rehabilitation services.     Physician Signature:__________________________________________________________  Date:  Please sign and return

## 2021-03-10 NOTE — PROGRESS NOTES
[address] [address]  Outpatient Physical Therapy    Treatment Note        Date: 3/10/2021  Patient: Caro Paredes  : 1990  ACCT #: [de-identified]  Referring Practitioner: Ana Corbin  Diagnosis: ACL tear, MMT    Visit Information:  PT Visit Information  Onset Date: 10/02/20  PT Insurance Information: [address]  Total # of Visits Approved: 20  Total # of Visits to Date: 25  No Show: 0  Canceled Appointment: 3  Progress Note Counter: -    Subjective: Pt reports 0-1/10 pain currently under knee cap. Pt reports not wearing knee brace much at all anymore w/ significant improvement. Pt reports worse pain has gotten within last week is 5/10. Comments: RTD 3/24/21  HEP Compliance:  [x] Good [] Fair [] Poor [] Reports not doing due to:    Vital Signs  Patient Currently in Pain: Denies   Pain Screening  Patient Currently in Pain: Denies    OBJECTIVE:   Exercises  Exercise 18: Pt able to lift >/=25# w/o difficulty or inc in pain  Exercise 19: SLS RLE 30'' - no instability observed  Exercise 20: Administered/Reviewed HEP based on facility gym equipment as pt plans to join facility gym. Ex's include: leg press, knee ext, knee curl, hip abd/add, goblet squats, F/L lunges, split squats, bench step ups, calf raises (2 variations), RDL    Strength: [] NT  [x] MMT completed:  Strength RLE  R Hip Flexion: 5/5  R Hip Extension: 5/5  R Hip ABduction: 5/5  R Hip ADduction: 5/5  R Hip Internal Rotation: 5/5  R Hip External Rotation: 5/5  R Knee Flexion: 5/5  R Knee Extension: 5/5  R Ankle Dorsiflexion: 5/5  R Ankle Plantar flexion: 5/5    ROM: [] NT  [x] ROM measurements:  AROM RLE (degrees)  RLE General AROM: knee 0-131 in supine       *Indicates exercise, modality, or manual techniques to be initiated when appropriate    Assessment:    Body structures, Functions, Activity limitations: Decreased functional mobility , Decreased ADL status, Decreased ROM, Decreased strength, Decreased balance, Increased pain, Decreased high-level IADLs, Decreased endurance  Assessment: Pt demo's good progress towards goals since last progress note on 1/20/21. Pt has had significant reduction in patellar tendon pain since not wearing brace as often. Pt also demo's improved strength and ability to jog, hop, and return to gym independently. Pt denies any further needs or concerns at this time and will cont to utilize facility gym to self-manage sx's. Treatment Diagnosis: decreased R knee AROM, decreased R LE strength, impaired gait and functional mobility, and decreased functional activity tolerance  Prognosis: Excellent     Goals:  Short term goals  Time Frame for Short term goals: 4- 5 weeks  Short term goal 1: The pt will report decreased R knee pain </= 2/10 at worst to increase ease with ADL's  Short term goal 2: The patient will ambulate >/=350 ft independently without AD in order to safely ambulate in the community  Long term goals  Time Frame for Long term goals : 10 weeks  Long term goal 1: The pt will demo improved R LE strength 5/5 in order to safely ambulate unlimited distances without AD indep at PLOF  Long term goal 2: The pt will demo improved R knee AROM >/=0-130* in order to perform stairs recip indep at PLOF  Long term goal 3: The pt will have improvement in LEFS score >/=70/80 in order to return to PLOF(updated 11/13/20)  Long term goal 4: The pt will demo improved R SLS >/=30 seconds to increase ease with ambulation  Long term goal 5: The pt will be indep/compliant with HEP in order to self manage symptoms upon D/C  Long term goal 6: The pt will be able to lift >/=25# without pain in R knee >2/10 in order to perform heavy houshehold activities and duties at [facility]  Progress toward goals: see D/C    POST-PAIN       Pain Rating (0-10 pain scale):   0/10   Location and pain description same as pre-treatment unless indicated.    Action: [] NA   [x] Perform HEP  [] Meds as prescribed  [] Modalities as prescribed   [] Call

## 2021-05-11 ENCOUNTER — HOSPITAL ENCOUNTER (OUTPATIENT)
Dept: SLEEP CENTER | Age: 31
Discharge: HOME | End: 2021-05-13
Payer: COMMERCIAL

## 2021-05-11 PROCEDURE — 95806 SLEEP STUDY UNATT&RESP EFFT: CPT

## 2021-05-22 PROCEDURE — 95806 SLEEP STUDY UNATT&RESP EFFT: CPT | Performed by: INTERNAL MEDICINE

## 2021-08-09 ENCOUNTER — OFFICE VISIT (OUTPATIENT)
Dept: FAMILY MEDICINE CLINIC | Age: 31
End: 2021-08-09
Payer: COMMERCIAL

## 2021-08-09 VITALS
BODY MASS INDEX: 30.13 KG/M2 | OXYGEN SATURATION: 98 % | HEART RATE: 83 BPM | SYSTOLIC BLOOD PRESSURE: 138 MMHG | DIASTOLIC BLOOD PRESSURE: 86 MMHG | TEMPERATURE: 98.6 F | HEIGHT: 74 IN | WEIGHT: 234.8 LBS

## 2021-08-09 DIAGNOSIS — R41.89 BRAIN FOG: ICD-10-CM

## 2021-08-09 DIAGNOSIS — L72.3 SCROTAL SEBACEOUS CYST: ICD-10-CM

## 2021-08-09 DIAGNOSIS — Z00.00 ANNUAL PHYSICAL EXAM: Primary | ICD-10-CM

## 2021-08-09 DIAGNOSIS — F41.9 ANXIETY: ICD-10-CM

## 2021-08-09 PROCEDURE — 99395 PREV VISIT EST AGE 18-39: CPT | Performed by: FAMILY MEDICINE

## 2021-08-09 RX ORDER — VENLAFAXINE HYDROCHLORIDE 37.5 MG/1
37.5 CAPSULE, EXTENDED RELEASE ORAL DAILY
Qty: 30 CAPSULE | Refills: 3 | Status: SHIPPED | OUTPATIENT
Start: 2021-08-09 | End: 2021-09-09 | Stop reason: SDUPTHER

## 2021-08-09 SDOH — ECONOMIC STABILITY: FOOD INSECURITY: WITHIN THE PAST 12 MONTHS, THE FOOD YOU BOUGHT JUST DIDN'T LAST AND YOU DIDN'T HAVE MONEY TO GET MORE.: NEVER TRUE

## 2021-08-09 SDOH — ECONOMIC STABILITY: FOOD INSECURITY: WITHIN THE PAST 12 MONTHS, YOU WORRIED THAT YOUR FOOD WOULD RUN OUT BEFORE YOU GOT MONEY TO BUY MORE.: NEVER TRUE

## 2021-08-09 SDOH — ECONOMIC STABILITY: TRANSPORTATION INSECURITY
IN THE PAST 12 MONTHS, HAS LACK OF TRANSPORTATION KEPT YOU FROM MEETINGS, WORK, OR FROM GETTING THINGS NEEDED FOR DAILY LIVING?: NO

## 2021-08-09 SDOH — ECONOMIC STABILITY: TRANSPORTATION INSECURITY
IN THE PAST 12 MONTHS, HAS THE LACK OF TRANSPORTATION KEPT YOU FROM MEDICAL APPOINTMENTS OR FROM GETTING MEDICATIONS?: NO

## 2021-08-09 ASSESSMENT — PATIENT HEALTH QUESTIONNAIRE - PHQ9
2. FEELING DOWN, DEPRESSED OR HOPELESS: 1
SUM OF ALL RESPONSES TO PHQ QUESTIONS 1-9: 1
SUM OF ALL RESPONSES TO PHQ QUESTIONS 1-9: 1
SUM OF ALL RESPONSES TO PHQ9 QUESTIONS 1 & 2: 1
SUM OF ALL RESPONSES TO PHQ QUESTIONS 1-9: 1
1. LITTLE INTEREST OR PLEASURE IN DOING THINGS: 0

## 2021-08-09 ASSESSMENT — ENCOUNTER SYMPTOMS
RESPIRATORY NEGATIVE: 1
GASTROINTESTINAL NEGATIVE: 1
EYES NEGATIVE: 1
SHORTNESS OF BREATH: 0
ALLERGIC/IMMUNOLOGIC NEGATIVE: 1

## 2021-08-09 ASSESSMENT — SOCIAL DETERMINANTS OF HEALTH (SDOH): HOW HARD IS IT FOR YOU TO PAY FOR THE VERY BASICS LIKE FOOD, HOUSING, MEDICAL CARE, AND HEATING?: NOT HARD AT ALL

## 2021-08-09 NOTE — PROGRESS NOTES
Patient is seen in follow up for   Chief Complaint   Patient presents with    Skin Problem     Would like a referral to dermatology. Saw you a couple years ago for cysts on the scrotum. Cysts are constant, not painful.  Other     Brain fog issue. Some days worse than others. Has had several tests done that could rule out any issues. Sleep study, holter monitor.  Allergies     Saw allergist, they told him the items that he's allergic to. HPIHere for referral to dermatology. He has a long history of feeling a fog feeling. He has significant anxiety. He has not been treated for anxiety.     Past Medical History:   Diagnosis Date    Heart palpitations      Patient Active Problem List    Diagnosis Date Noted    Sleep apnea      Past Surgical History:   Procedure Laterality Date    ANTERIOR CRUCIATE LIGAMENT REPAIR Right 10/2/2020    RIGHT: ARTHROSCOPY KNEE ANTERIOR CRUCIATE LIGAMENT RECONSTRUCTION AND  MEDIAL + LATERAL MENISCENTOMY performed by Lizandro Vang MD at [address] [place] [address] TYMPANOSTOMY TUBE PLACEMENT      WISDOM TOOTH EXTRACTION       Family History   Problem Relation Age of Onset    High Blood Pressure Father     Cancer Other         GM     Social History     Socioeconomic History    Marital status: Single     Spouse name: None    Number of children: None    Years of education: None    Highest education level: None   Occupational History    None   Tobacco Use    Smoking status: Former Smoker     Packs/day: 0.25     Types: Cigarettes     Quit date: 2015     Years since quittin.8    Smokeless tobacco: Never Used   Vaping Use    Vaping Use: Some days   Substance and Sexual Activity    Alcohol use: No    Drug use: No    Sexual activity: None   Other Topics Concern    None   Social History Narrative    None     Social Determinants of Health     Financial Resource Strain: Low Risk     Difficulty of Paying Living Expenses: Not hard at all Food Insecurity: No Food Insecurity    Worried About Running Out of Food in the Last Year: Never true    Melly of Food in the Last Year: Never true   Transportation Needs: No Transportation Needs    Lack of Transportation (Medical): No    Lack of Transportation (Non-Medical): No   Physical Activity:     Days of Exercise per Week:     Minutes of Exercise per Session:    Stress:     Feeling of Stress :    Social Connections:     Frequency of Communication with Friends and Family:     Frequency of Social Gatherings with Friends and Family:     Attends Church Services:     Active Member of Clubs or Organizations:     Attends Club or Organization Meetings:     Marital Status:    Intimate Partner Violence:     Fear of Current or Ex-Partner:     Emotionally Abused:     Physically Abused:     Sexually Abused:      Current Outpatient Medications   Medication Sig Dispense Refill    MULTIPLE VITAMIN PO Take by mouth      venlafaxine (EFFEXOR XR) 37.5 MG extended release capsule Take 1 capsule by mouth daily 30 capsule 3    Probiotic Product (PROBIOTIC DAILY PO) Take 300 mg by mouth       No current facility-administered medications for this visit. Current Outpatient Medications on File Prior to Visit   Medication Sig Dispense Refill    MULTIPLE VITAMIN PO Take by mouth      Probiotic Product (PROBIOTIC DAILY PO) Take 300 mg by mouth       No current facility-administered medications on file prior to visit.      Allergies   Allergen Reactions    Latex Itching     Added based on information entered during case entry, please review and add reactions, type, and severity as needed    Pcn [Penicillins] Anaphylaxis    Poultry Meal Anaphylaxis    Amoxicillin Itching     pt finished 10 day course of Amoxicillin and proceeded to have itching to bilateral hands, arms, neck, and top of head without any visible rash    Tessalon [Benzonatate] Itching     Health Maintenance   Topic Date Due    Hepatitis C screen  Never done    Hepatitis B vaccine (3 of 3 - 3-dose primary series) 12/05/2002    Flu vaccine (1) 09/01/2021    DTaP/Tdap/Td vaccine (2 - Td or Tdap) 05/30/2025    COVID-19 Vaccine  Completed    HIV screen  Completed    Hepatitis A vaccine  Aged Out    Hib vaccine  Aged Out    Meningococcal (ACWY) vaccine  Aged Out    Pneumococcal 0-64 years Vaccine  Aged Out    Varicella vaccine  Discontinued       Review of Systems     Review of Systems   Constitutional: Negative for activity change, appetite change, chills, fever and unexpected weight change. HENT: Negative. Eyes: Negative. Respiratory: Negative. Negative for shortness of breath. Cardiovascular: Negative. Negative for chest pain and palpitations. Gastrointestinal: Negative. Endocrine: Negative. Genitourinary: Negative. Musculoskeletal: Negative. Skin: Negative. Allergic/Immunologic: Negative. Neurological: Negative. Hematological: Negative. Psychiatric/Behavioral: Positive for decreased concentration. The patient is nervous/anxious. Physical Exam  Vitals:    08/09/21 0746   BP: 138/86   Site: Left Upper Arm   Position: Sitting   Cuff Size: Medium Adult   Pulse: 83   Temp: 98.6 °F (37 °C)   TempSrc: Oral   SpO2: 98%   Weight: 234 lb 12.8 oz (106.5 kg)   Height: 6' 2\" (1.88 m)       Physical Exam  Constitutional:       Appearance: He is well-developed. HENT:      Right Ear: External ear normal.      Left Ear: External ear normal.   Eyes:      Conjunctiva/sclera: Conjunctivae normal.      Pupils: Pupils are equal, round, and reactive to light. Neck:      Thyroid: No thyromegaly. Cardiovascular:      Rate and Rhythm: Normal rate and regular rhythm. Heart sounds: Normal heart sounds. No murmur heard. No friction rub. No gallop. Pulmonary:      Effort: Pulmonary effort is normal. No respiratory distress. Breath sounds: Normal breath sounds. No wheezing.    Abdominal:      General: Bowel sounds are normal. There is no distension. Palpations: Abdomen is soft. There is no mass. Tenderness: There is no abdominal tenderness. There is no guarding or rebound. Hernia: No hernia is present. Genitourinary:     Penis: Normal.    Musculoskeletal:         General: No tenderness. Normal range of motion. Cervical back: Normal range of motion and neck supple. Lymphadenopathy:      Cervical: No cervical adenopathy. Skin:     General: Skin is warm and dry. Neurological:      Mental Status: He is alert and oriented to person, place, and time. Cranial Nerves: No cranial nerve deficit. Coordination: Coordination normal.         Assessment   Diagnosis Orders   1. Annual physical exam  Comprehensive Metabolic Panel    CBC With Auto Differential    Lipid, Fasting   2. Scrotal sebaceous cyst  Amb External Referral To Dermatology   3. Brain fog  TSH without Reflex    Vitamin D 25 Hydroxy   4.  Anxiety       Problem List     None          Plan  Orders Placed This Encounter   Procedures    Comprehensive Metabolic Panel     Standing Status:   Future     Standing Expiration Date:   8/5/2022    CBC With Auto Differential     Standing Status:   Future     Standing Expiration Date:   8/5/2022    Lipid, Fasting     Standing Status:   Future     Standing Expiration Date:   8/5/2022    TSH without Reflex     Standing Status:   Future     Standing Expiration Date:   8/9/2022    Vitamin D 25 Hydroxy     Standing Status:   Future     Standing Expiration Date:   8/9/2022    Amb External Referral To Dermatology     Referral Priority:   Routine     Referral Type:   Consult for Advice and Opinion     Requested Specialty:   Dermatology     Number of Visits Requested:   1     Orders Placed This Encounter   Medications    venlafaxine (EFFEXOR XR) 37.5 MG extended release capsule     Sig: Take 1 capsule by mouth daily     Dispense:  30 capsule     Refill:  3     Return in about 4 weeks (around 9/6/2021).   Jailene Fung MD

## 2021-08-18 ENCOUNTER — TELEPHONE (OUTPATIENT)
Dept: FAMILY MEDICINE CLINIC | Age: 31
End: 2021-08-18

## 2021-08-18 NOTE — TELEPHONE ENCOUNTER
Patient is calling in he has some concerns on the medication Venlafaxine he is possibly experiencing some side affects. Dizzy, light headed, spinning. Yesterday and today have been the worst so far. Nausea even when eating with it, and body aches, pupil dilation and can't focus close up. He would like to speak to the Dr. Gamal Powell these issues or a message on my chart. Would like to know how much longer he will feel these side affects. Please advise.

## 2021-08-19 RX ORDER — ONDANSETRON 4 MG/1
4 TABLET, FILM COATED ORAL DAILY PRN
Qty: 30 TABLET | Refills: 0 | Status: SHIPPED | OUTPATIENT
Start: 2021-08-19

## 2021-08-19 NOTE — TELEPHONE ENCOUNTER
Called and spoke to Ana Luisa Sotelo letting him know that Dr. Gary Hernandez called in [address] to the pharmacy for him for the nausea.

## 2021-09-09 ENCOUNTER — OFFICE VISIT (OUTPATIENT)
Dept: FAMILY MEDICINE CLINIC | Age: 31
End: 2021-09-09
Payer: COMMERCIAL

## 2021-09-09 VITALS
OXYGEN SATURATION: 97 % | HEIGHT: 74 IN | TEMPERATURE: 98.1 F | SYSTOLIC BLOOD PRESSURE: 138 MMHG | DIASTOLIC BLOOD PRESSURE: 86 MMHG | HEART RATE: 67 BPM | BODY MASS INDEX: 29.54 KG/M2 | WEIGHT: 230.2 LBS
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
## 2021-09-09 DIAGNOSIS — S70.361A INSECT BITE OF RIGHT THIGH, INITIAL ENCOUNTER: ICD-10-CM

## 2021-09-09 DIAGNOSIS — J30.2 SEASONAL ALLERGIC RHINITIS, UNSPECIFIED TRIGGER: ICD-10-CM

## 2021-09-09 DIAGNOSIS — F41.9 ANXIETY: Primary | ICD-10-CM

## 2021-09-09 PROCEDURE — 99213 OFFICE O/P EST LOW 20 MIN: CPT | Performed by: FAMILY MEDICINE

## 2021-09-09 RX ORDER — CEPHALEXIN 500 MG/1
500 CAPSULE ORAL 3 TIMES DAILY
Qty: 21 CAPSULE | Refills: 0 | Status: SHIPPED | OUTPATIENT
Start: 2021-09-09 | End: 2021-09-16

## 2021-09-09 RX ORDER — VENLAFAXINE HYDROCHLORIDE 75 MG/1
75 CAPSULE, EXTENDED RELEASE ORAL DAILY
Qty: 90 CAPSULE | Refills: 3 | Status: SHIPPED | OUTPATIENT
Start: 2021-09-09 | End: 2022-03-16 | Stop reason: SDUPTHER

## 2021-09-09 ASSESSMENT — ENCOUNTER SYMPTOMS
RESPIRATORY NEGATIVE: 1
ALLERGIC/IMMUNOLOGIC NEGATIVE: 1
SHORTNESS OF BREATH: 0
GASTROINTESTINAL NEGATIVE: 1
EYES NEGATIVE: 1

## 2021-09-09 NOTE — PROGRESS NOTES
Patient is seen in follow up for   Chief Complaint   Patient presents with    Anxiety     1 mo checkup for anxiety. Began venlafaxine at time of last visit. States that he's feeling better but not great. Side effects were bad early on but subsided after a week.  Cyst     1 mo checkup for cysts in scrotum area. Referred to dermatology. Will see Derm on Tuesday for inital consult.  Allergies     Stopped Claritin before his last visit but needs to take something now, wants to know what he should take with his new med.  Skin Problem     Bug bites located on right leg and right side of back. Got them . Thinks they're mosquito bites but they're large. HPIhere for follow up on anxiety doing well 25% better. bug bite right thigh and rash on back    Past Medical History:   Diagnosis Date    Heart palpitations      Patient Active Problem List    Diagnosis Date Noted    Sleep apnea      Past Surgical History:   Procedure Laterality Date    ANTERIOR CRUCIATE LIGAMENT REPAIR Right 10/2/2020    RIGHT: ARTHROSCOPY KNEE ANTERIOR CRUCIATE LIGAMENT RECONSTRUCTION AND  MEDIAL + LATERAL MENISCENTOMY performed by Babs Glasgow MD at [address] [place] [address] TYMPANOSTOMY TUBE PLACEMENT      WISDOM TOOTH EXTRACTION       Family History   Problem Relation Age of Onset    High Blood Pressure Father     Cancer Other         GM     Social History     Socioeconomic History    Marital status: Single     Spouse name: Not on file    Number of children: Not on file    Years of education: Not on file    Highest education level: Not on file   Occupational History    Not on file   Tobacco Use    Smoking status: Former Smoker     Packs/day: 0.25     Types: Cigarettes     Quit date: 2015     Years since quittin.9    Smokeless tobacco: Never Used   Vaping Use    Vaping Use: Some days   Substance and Sexual Activity    Alcohol use: No    Drug use: No    Sexual activity: Not on file   Other Topics Concern    Not on file   Social History Narrative    Not on file     Social Determinants of Health     Financial Resource Strain: Low Risk     Difficulty of Paying Living Expenses: Not hard at all   Food Insecurity: No Food Insecurity    Worried About Running Out of Food in the Last Year: Never true    [address] in the Last Year: Never true   Transportation Needs: No Transportation Needs    Lack of Transportation (Medical): No    Lack of Transportation (Non-Medical): No   Physical Activity:     Days of Exercise per Week:     Minutes of Exercise per Session:    Stress:     Feeling of Stress :    Social Connections:     Frequency of Communication with Friends and Family:     Frequency of Social Gatherings with Friends and Family:     Attends Rastafari Services:     Active Member of Clubs or Organizations:     Attends Club or Organization Meetings:     Marital Status:    Intimate Partner Violence:     Fear of Current or Ex-Partner:     Emotionally Abused:     Physically Abused:     Sexually Abused:      Current Outpatient Medications   Medication Sig Dispense Refill    venlafaxine (EFFEXOR XR) 75 MG extended release capsule Take 1 capsule by mouth daily 90 capsule 3    cephALEXin (KEFLEX) 500 MG capsule Take 1 capsule by mouth 3 times daily for 7 days 21 capsule 0    ondansetron (ZOFRAN) 4 MG tablet Take 1 tablet by mouth daily as needed for Nausea or Vomiting 30 tablet 0    MULTIPLE VITAMIN PO Take by mouth      Probiotic Product (PROBIOTIC DAILY PO) Take 300 mg by mouth       No current facility-administered medications for this visit.      Current Outpatient Medications on File Prior to Visit   Medication Sig Dispense Refill    ondansetron (ZOFRAN) 4 MG tablet Take 1 tablet by mouth daily as needed for Nausea or Vomiting 30 tablet 0    MULTIPLE VITAMIN PO Take by mouth      Probiotic Product (PROBIOTIC DAILY PO) Take 300 mg by mouth       No current facility-administered medications on file prior to visit. Allergies   Allergen Reactions    Latex Itching     Added based on information entered during case entry, please review and add reactions, type, and severity as needed    Pcn [Penicillins] Anaphylaxis    Poultry Meal Anaphylaxis    Amoxicillin Itching     pt finished 10 day course of Amoxicillin and proceeded to have itching to bilateral hands, arms, neck, and top of head without any visible rash    Tessalon [Benzonatate] Itching     Health Maintenance   Topic Date Due    Hepatitis C screen  Never done    Hepatitis B vaccine (3 of 3 - 3-dose primary series) 12/05/2002    Flu vaccine (1) Never done    DTaP/Tdap/Td vaccine (2 - Td or Tdap) 05/30/2025    COVID-19 Vaccine  Completed    HIV screen  Completed    Hepatitis A vaccine  Aged Out    Hib vaccine  Aged Out    Meningococcal (ACWY) vaccine  Aged Out    Pneumococcal 0-64 years Vaccine  Aged Out    Varicella vaccine  Discontinued       Review of Systems     Review of Systems   Constitutional: Negative for activity change, appetite change, chills, fever and unexpected weight change. HENT: Negative. Eyes: Negative. Respiratory: Negative. Negative for shortness of breath. Cardiovascular: Negative. Negative for chest pain and palpitations. Gastrointestinal: Negative. Endocrine: Negative. Genitourinary: Negative. Musculoskeletal: Negative. Skin: Negative. Allergic/Immunologic: Negative. Neurological: Negative. Hematological: Negative. Psychiatric/Behavioral: Negative. Physical Exam  Vitals:    09/09/21 0757   BP: 138/86   Site: Left Upper Arm   Position: Sitting   Cuff Size: Medium Adult   Pulse: 67   Temp: 98.1 °F (36.7 °C)   TempSrc: Oral   SpO2: 97%   Weight: 230 lb 3.2 oz (104.4 kg)   Height: 6' 2\" (1.88 m)       Physical Exam  Constitutional:       Appearance: He is well-developed.    HENT:      Right Ear: External ear normal.      Left Ear: External ear normal.   Eyes:      Conjunctiva/sclera: Conjunctivae normal.      Pupils: Pupils are equal, round, and reactive to light. Neck:      Thyroid: No thyromegaly. Cardiovascular:      Rate and Rhythm: Normal rate and regular rhythm. Heart sounds: Normal heart sounds. No murmur heard. No friction rub. No gallop. Pulmonary:      Effort: Pulmonary effort is normal. No respiratory distress. Breath sounds: Normal breath sounds. No wheezing. Abdominal:      General: Bowel sounds are normal. There is no distension. Palpations: Abdomen is soft. There is no mass. Tenderness: There is no abdominal tenderness. There is no guarding or rebound. Hernia: No hernia is present. Genitourinary:     Penis: Normal.    Musculoskeletal:         General: No tenderness. Normal range of motion. Cervical back: Normal range of motion and neck supple. Lymphadenopathy:      Cervical: No cervical adenopathy. Skin:     General: Skin is warm and dry. Neurological:      Mental Status: He is alert and oriented to person, place, and time. Cranial Nerves: No cranial nerve deficit. Coordination: Coordination normal.         Assessment   Diagnosis Orders   1. Anxiety     2. Seasonal allergic rhinitis, unspecified trigger       Problem List     None          Plan  No orders of the defined types were placed in this encounter. Orders Placed This Encounter   Medications    venlafaxine (EFFEXOR XR) 75 MG extended release capsule     Sig: Take 1 capsule by mouth daily     Dispense:  90 capsule     Refill:  3    cephALEXin (KEFLEX) 500 MG capsule     Sig: Take 1 capsule by mouth 3 times daily for 7 days     Dispense:  21 capsule     Refill:  0     Return in about 3 months (around 12/9/2021).   Ja Linares MD

## 2021-12-14 ENCOUNTER — OFFICE VISIT (OUTPATIENT)
Dept: FAMILY MEDICINE CLINIC | Age: 31
End: 2021-12-14
Payer: COMMERCIAL

## 2021-12-14 VITALS
SYSTOLIC BLOOD PRESSURE: 126 MMHG | TEMPERATURE: 97.7 F | BODY MASS INDEX: 29.9 KG/M2 | WEIGHT: 233 LBS | OXYGEN SATURATION: 98 % | HEART RATE: 73 BPM | RESPIRATION RATE: 12 BRPM | HEIGHT: 74 IN | DIASTOLIC BLOOD PRESSURE: 78 MMHG

## 2021-12-14 DIAGNOSIS — F41.9 ANXIETY: Primary | ICD-10-CM

## 2021-12-14 PROCEDURE — 99213 OFFICE O/P EST LOW 20 MIN: CPT | Performed by: FAMILY MEDICINE

## 2021-12-14 ASSESSMENT — ENCOUNTER SYMPTOMS
GASTROINTESTINAL NEGATIVE: 1
ALLERGIC/IMMUNOLOGIC NEGATIVE: 1
EYES NEGATIVE: 1
SHORTNESS OF BREATH: 0
RESPIRATORY NEGATIVE: 1

## 2021-12-14 NOTE — PROGRESS NOTES
Patient is seen in follow up for   Chief Complaint   Patient presents with   Jose Alberto Gutierrez for follow up on anxiety doing some better. Past Medical History:   Diagnosis Date    Heart palpitations      Patient Active Problem List    Diagnosis Date Noted    Sleep apnea      Past Surgical History:   Procedure Laterality Date    ANTERIOR CRUCIATE LIGAMENT REPAIR Right 10/2/2020    RIGHT: ARTHROSCOPY KNEE ANTERIOR CRUCIATE LIGAMENT RECONSTRUCTION AND  MEDIAL + LATERAL MENISCENTOMY performed by Tamiko Donis MD at [facility] TYMPANOSTOMY TUBE PLACEMENT      WISDOM TOOTH EXTRACTION       Family History   Problem Relation Age of Onset    High Blood Pressure Father     Cancer Other         GM     Social History     Socioeconomic History    Marital status: Single     Spouse name: None    Number of children: None    Years of education: None    Highest education level: None   Occupational History    None   Tobacco Use    Smoking status: Former Smoker     Packs/day: 0.25     Types: Cigarettes     Quit date: 2015     Years since quittin.2    Smokeless tobacco: Never Used   Vaping Use    Vaping Use: Some days   Substance and Sexual Activity    Alcohol use: No    Drug use: No    Sexual activity: None   Other Topics Concern    None   Social History Narrative    None     Social Determinants of Health     Financial Resource Strain: Low Risk     Difficulty of Paying Living Expenses: Not hard at all   Food Insecurity: No Food Insecurity    Worried About Running Out of Food in the Last Year: Never true    Melly of Food in the Last Year: Never true   Transportation Needs: No Transportation Needs    Lack of Transportation (Medical): No    Lack of Transportation (Non-Medical):  No   Physical Activity:     Days of Exercise per Week: Not on file    Minutes of Exercise per Session: Not on file   Stress:     Feeling of Stress : Not on file   Social Connections:     Frequency of Communication with Friends and Family: Not on file    Frequency of Social Gatherings with Friends and Family: Not on file    Attends Mormon Services: Not on file    Active Member of Clubs or Organizations: Not on file    Attends Club or Organization Meetings: Not on file    Marital Status: Not on file   Intimate Partner Violence:     Fear of Current or Ex-Partner: Not on file    Emotionally Abused: Not on file    Physically Abused: Not on file    Sexually Abused: Not on file   Housing Stability:     Unable to Pay for Housing in the Last Year: Not on file    Number of [place] in the Last Year: Not on file    Unstable Housing in the Last Year: Not on file     Current Outpatient Medications   Medication Sig Dispense Refill    venlafaxine (EFFEXOR XR) 75 MG extended release capsule Take 1 capsule by mouth daily 90 capsule 3    ondansetron (ZOFRAN) 4 MG tablet Take 1 tablet by mouth daily as needed for Nausea or Vomiting 30 tablet 0    MULTIPLE VITAMIN PO Take by mouth      Probiotic Product (PROBIOTIC DAILY PO) Take 300 mg by mouth       No current facility-administered medications for this visit. Current Outpatient Medications on File Prior to Visit   Medication Sig Dispense Refill    venlafaxine (EFFEXOR XR) 75 MG extended release capsule Take 1 capsule by mouth daily 90 capsule 3    ondansetron (ZOFRAN) 4 MG tablet Take 1 tablet by mouth daily as needed for Nausea or Vomiting 30 tablet 0    MULTIPLE VITAMIN PO Take by mouth      Probiotic Product (PROBIOTIC DAILY PO) Take 300 mg by mouth       No current facility-administered medications on file prior to visit.      Allergies   Allergen Reactions    Latex Itching     Added based on information entered during case entry, please review and add reactions, type, and severity as needed    Pcn [Penicillins] Anaphylaxis    Poultry Meal Anaphylaxis    Amoxicillin Itching     pt finished 10 day course of Amoxicillin and proceeded to have itching to bilateral hands, arms, neck, and top of head without any visible rash    Tessalon [Benzonatate] Itching     Health Maintenance   Topic Date Due    Hepatitis C screen  Never done    Hepatitis B vaccine (3 of 3 - 3-dose primary series) 12/05/2002    Flu vaccine (1) Never done    COVID-19 Vaccine (3 - Booster for Pfizer series) 10/29/2021    DTaP/Tdap/Td vaccine (2 - Td or Tdap) 05/30/2025    HIV screen  Completed    Hepatitis A vaccine  Aged Out    Hib vaccine  Aged Out    Meningococcal (ACWY) vaccine  Aged Out    Pneumococcal 0-64 years Vaccine  Aged Out    Varicella vaccine  Discontinued       Review of Systems     Review of Systems   Constitutional: Negative for activity change, appetite change, chills, fever and unexpected weight change. HENT: Negative. Eyes: Negative. Respiratory: Negative. Negative for shortness of breath. Cardiovascular: Negative. Negative for chest pain and palpitations. Gastrointestinal: Negative. Endocrine: Negative. Genitourinary: Negative. Musculoskeletal: Negative. Skin: Negative. Allergic/Immunologic: Negative. Neurological: Negative. Hematological: Negative. Psychiatric/Behavioral: Negative. Physical Exam  Vitals:    12/14/21 0901   BP: 126/78   Pulse: 73   Resp: 12   Temp: 97.7 °F (36.5 °C)   SpO2: 98%   Weight: 233 lb (105.7 kg)   Height: 6' 2\" (1.88 m)       Physical Exam  Constitutional:       Appearance: He is well-developed. HENT:      Right Ear: External ear normal.      Left Ear: External ear normal.   Eyes:      Conjunctiva/sclera: Conjunctivae normal.      Pupils: Pupils are equal, round, and reactive to light. Neck:      Thyroid: No thyromegaly. Cardiovascular:      Rate and Rhythm: Normal rate and regular rhythm. Heart sounds: Normal heart sounds. No murmur heard. No friction rub. No gallop.     Pulmonary:      Effort: Pulmonary effort is normal. No respiratory distress. Breath sounds: Normal breath sounds. No wheezing. Abdominal:      General: Bowel sounds are normal. There is no distension. Palpations: Abdomen is soft. There is no mass. Tenderness: There is no abdominal tenderness. There is no guarding or rebound. Hernia: No hernia is present. Genitourinary:     Penis: Normal.    Musculoskeletal:         General: No tenderness. Normal range of motion. Cervical back: Normal range of motion and neck supple. Lymphadenopathy:      Cervical: No cervical adenopathy. Skin:     General: Skin is warm and dry. Neurological:      Mental Status: He is alert and oriented to person, place, and time. Cranial Nerves: No cranial nerve deficit. Coordination: Coordination normal.         Assessment   Diagnosis Orders   1. Anxiety       Problem List     None          Plan  Doing well continue same dose. No orders of the defined types were placed in this encounter. No follow-ups on file.   Tracy Jon MD

## 2022-03-16 ENCOUNTER — OFFICE VISIT (OUTPATIENT)
Dept: FAMILY MEDICINE CLINIC | Age: 32
End: 2022-03-16
Payer: COMMERCIAL

## 2022-03-16 VITALS
RESPIRATION RATE: 13 BRPM | OXYGEN SATURATION: 97 % | HEART RATE: 82 BPM | SYSTOLIC BLOOD PRESSURE: 128 MMHG | BODY MASS INDEX: 31.83 KG/M2 | DIASTOLIC BLOOD PRESSURE: 80 MMHG | WEIGHT: 248 LBS | HEIGHT: 74 IN | TEMPERATURE: 98.3 F

## 2022-03-16 DIAGNOSIS — R41.89 BRAIN FOG: ICD-10-CM

## 2022-03-16 DIAGNOSIS — F41.9 ANXIETY: Primary | ICD-10-CM

## 2022-03-16 PROCEDURE — 99213 OFFICE O/P EST LOW 20 MIN: CPT | Performed by: FAMILY MEDICINE

## 2022-03-16 RX ORDER — VENLAFAXINE HYDROCHLORIDE 150 MG/1
150 CAPSULE, EXTENDED RELEASE ORAL DAILY
Qty: 90 CAPSULE | Refills: 3 | Status: SHIPPED | OUTPATIENT
Start: 2022-03-16

## 2022-03-16 ASSESSMENT — PATIENT HEALTH QUESTIONNAIRE - PHQ9
2. FEELING DOWN, DEPRESSED OR HOPELESS: 0
1. LITTLE INTEREST OR PLEASURE IN DOING THINGS: 0
SUM OF ALL RESPONSES TO PHQ QUESTIONS 1-9: 0
SUM OF ALL RESPONSES TO PHQ QUESTIONS 1-9: 0
SUM OF ALL RESPONSES TO PHQ9 QUESTIONS 1 & 2: 0
SUM OF ALL RESPONSES TO PHQ QUESTIONS 1-9: 0
SUM OF ALL RESPONSES TO PHQ QUESTIONS 1-9: 0

## 2022-03-16 NOTE — PROGRESS NOTES
Patient is seen in follow up for   Chief Complaint   Patient presents with    Anxiety     f/u     HPIhere for follow up on anxiety doing well. Past Medical History:   Diagnosis Date    Heart palpitations      Patient Active Problem List    Diagnosis Date Noted    Sleep apnea      Past Surgical History:   Procedure Laterality Date    ANTERIOR CRUCIATE LIGAMENT REPAIR Right 10/2/2020    RIGHT: ARTHROSCOPY KNEE ANTERIOR CRUCIATE LIGAMENT RECONSTRUCTION AND  MEDIAL + LATERAL MENISCENTOMY performed by Jennifer Cardenas MD at [address] [place] [address] TYMPANOSTOMY TUBE PLACEMENT      WISDOM TOOTH EXTRACTION       Family History   Problem Relation Age of Onset    High Blood Pressure Father     Cancer Other         GM     Social History     Socioeconomic History    Marital status: Single     Spouse name: Not on file    Number of children: Not on file    Years of education: Not on file    Highest education level: Not on file   Occupational History    Not on file   Tobacco Use    Smoking status: Former Smoker     Packs/day: 0.25     Types: Cigarettes     Quit date: 2015     Years since quittin.4    Smokeless tobacco: Never Used   Vaping Use    Vaping Use: Some days   Substance and Sexual Activity    Alcohol use: No    Drug use: No    Sexual activity: Not on file   Other Topics Concern    Not on file   Social History Narrative    Not on file     Social Determinants of Health     Financial Resource Strain: Low Risk     Difficulty of Paying Living Expenses: Not hard at all   Food Insecurity: No Food Insecurity    Worried About Running Out of Food in the Last Year: Never true    Melly of Food in the Last Year: Never true   Transportation Needs: No Transportation Needs    Lack of Transportation (Medical): No    Lack of Transportation (Non-Medical):  No   Physical Activity:     Days of Exercise per Week: Not on file    Minutes of Exercise per Session: Not on file Stress:     Feeling of Stress : Not on file   Social Connections:     Frequency of Communication with Friends and Family: Not on file    Frequency of Social Gatherings with Friends and Family: Not on file    Attends Mandaeism Services: Not on file    Active Member of Clubs or Organizations: Not on file    Attends Club or Organization Meetings: Not on file    Marital Status: Not on file   Intimate Partner Violence:     Fear of Current or Ex-Partner: Not on file    Emotionally Abused: Not on file    Physically Abused: Not on file    Sexually Abused: Not on file   Housing Stability:     Unable to Pay for Housing in the Last Year: Not on file    Number of [place] in the Last Year: Not on file    Unstable Housing in the Last Year: Not on file     Current Outpatient Medications   Medication Sig Dispense Refill    venlafaxine (EFFEXOR XR) 150 MG extended release capsule Take 1 capsule by mouth daily 90 capsule 3    ondansetron (ZOFRAN) 4 MG tablet Take 1 tablet by mouth daily as needed for Nausea or Vomiting 30 tablet 0    MULTIPLE VITAMIN PO Take by mouth      Probiotic Product (PROBIOTIC DAILY PO) Take 300 mg by mouth       No current facility-administered medications for this visit. Current Outpatient Medications on File Prior to Visit   Medication Sig Dispense Refill    ondansetron (ZOFRAN) 4 MG tablet Take 1 tablet by mouth daily as needed for Nausea or Vomiting 30 tablet 0    MULTIPLE VITAMIN PO Take by mouth      Probiotic Product (PROBIOTIC DAILY PO) Take 300 mg by mouth       No current facility-administered medications on file prior to visit.      Allergies   Allergen Reactions    Latex Itching     Added based on information entered during case entry, please review and add reactions, type, and severity as needed    Pcn [Penicillins] Anaphylaxis    Poultry Meal Anaphylaxis    Amoxicillin Itching     pt finished 10 day course of Amoxicillin and proceeded to have itching to bilateral hands, arms, neck, and top of head without any visible rash    Tessalon [Benzonatate] Itching     Health Maintenance   Topic Date Due    Hepatitis C screen  Never done    Hepatitis B vaccine (3 of 3 - 3-dose primary series) 12/05/2002    Flu vaccine (1) Never done    COVID-19 Vaccine (3 - Booster for Pfizer series) 09/29/2021    Depression Screen  03/16/2023    DTaP/Tdap/Td vaccine (2 - Td or Tdap) 05/30/2025    HIV screen  Completed    Hepatitis A vaccine  Aged Out    Hib vaccine  Aged Out    Meningococcal (ACWY) vaccine  Aged Out    Pneumococcal 0-64 years Vaccine  Aged Out    Varicella vaccine  Discontinued       Review of Systems     Review of Systems   Constitutional: Negative for activity change, appetite change, chills, fever and unexpected weight change. HENT: Negative. Eyes: Negative. Respiratory: Negative. Negative for shortness of breath. Cardiovascular: Negative. Negative for chest pain and palpitations. Gastrointestinal: Negative. Endocrine: Negative. Genitourinary: Negative. Musculoskeletal: Negative. Skin: Negative. Allergic/Immunologic: Negative. Neurological: Negative. Hematological: Negative. Psychiatric/Behavioral: Negative. Physical Exam  Vitals:    03/16/22 0837   BP: 128/80   Pulse: 82   Resp: 13   Temp: 98.3 °F (36.8 °C)   SpO2: 97%   Weight: 248 lb (112.5 kg)   Height: 6' 2\" (1.88 m)       Physical Exam    Assessment   Diagnosis Orders   1. Anxiety     2. Brain fog       Problem List     None          Plan  No orders of the defined types were placed in this encounter. Orders Placed This Encounter   Medications    venlafaxine (EFFEXOR XR) 150 MG extended release capsule     Sig: Take 1 capsule by mouth daily     Dispense:  90 capsule     Refill:  3     No follow-ups on file.   Royal Nayan MD

## 2022-03-17 ASSESSMENT — ENCOUNTER SYMPTOMS
GASTROINTESTINAL NEGATIVE: 1
RESPIRATORY NEGATIVE: 1
ALLERGIC/IMMUNOLOGIC NEGATIVE: 1
EYES NEGATIVE: 1
SHORTNESS OF BREATH: 0

## 2022-07-11 ENCOUNTER — HOSPITAL ENCOUNTER (OUTPATIENT)
Dept: MRI IMAGING | Age: 32
Discharge: HOME | End: 2022-07-13
Payer: COMMERCIAL

## 2022-07-11 DIAGNOSIS — S83.249A TEAR OF MEDIAL MENISCUS OF KNEE, UNSPECIFIED LATERALITY, UNSPECIFIED TEAR TYPE, UNSPECIFIED WHETHER OLD OR CURRENT TEAR, INITIAL ENCOUNTER: ICD-10-CM

## 2022-07-11 PROCEDURE — 73721 MRI JNT OF LWR EXTRE W/O DYE: CPT

## 2022-08-05 ENCOUNTER — HOSPITAL ENCOUNTER (OUTPATIENT)
Dept: PREADMISSION TESTING | Age: 32
Discharge: HOME | End: 2022-08-09
Payer: COMMERCIAL

## 2022-08-05 VITALS
DIASTOLIC BLOOD PRESSURE: 82 MMHG | RESPIRATION RATE: 16 BRPM | HEART RATE: 84 BPM | WEIGHT: 264 LBS | OXYGEN SATURATION: 98 % | BODY MASS INDEX: 32.83 KG/M2 | TEMPERATURE: 98.3 F | SYSTOLIC BLOOD PRESSURE: 148 MMHG | HEIGHT: 75 IN

## 2022-08-05 LAB
ALBUMIN SERPL-MCNC: 4.9 G/DL (ref 3.5–4.6)
ALP BLD-CCNC: 78 U/L (ref 35–104)
ALT SERPL-CCNC: 19 U/L (ref 0–41)
ANION GAP SERPL CALCULATED.3IONS-SCNC: 9 MEQ/L (ref 9–15)
APTT: 36.7 SEC (ref 24.4–36.8)
AST SERPL-CCNC: 21 U/L (ref 0–40)
BASOPHILS ABSOLUTE: 0 K/UL (ref 0–0.2)
BASOPHILS RELATIVE PERCENT: 0.7 %
BILIRUB SERPL-MCNC: 0.6 MG/DL (ref 0.2–0.7)
BILIRUBIN URINE: NEGATIVE
BLOOD, URINE: NEGATIVE
BUN BLDV-MCNC: 17 MG/DL (ref 6–20)
CALCIUM SERPL-MCNC: 9.8 MG/DL (ref 8.5–9.9)
CHLORIDE BLD-SCNC: 104 MEQ/L (ref 95–107)
CLARITY: CLEAR
CO2: 27 MEQ/L (ref 20–31)
COLOR: YELLOW
CREAT SERPL-MCNC: 1 MG/DL (ref 0.7–1.2)
EOSINOPHILS ABSOLUTE: 0.3 K/UL (ref 0–0.7)
EOSINOPHILS RELATIVE PERCENT: 4.6 %
GFR AFRICAN AMERICAN: >60
GFR NON-AFRICAN AMERICAN: >60
GLOBULIN: 2.5 G/DL (ref 2.3–3.5)
GLUCOSE BLD-MCNC: 80 MG/DL (ref 70–99)
GLUCOSE URINE: NEGATIVE MG/DL
HCT VFR BLD CALC: 46.4 % (ref 42–52)
HEMOGLOBIN: 16.1 G/DL (ref 14–18)
INR BLD: 1.1
KETONES, URINE: NEGATIVE MG/DL
LEUKOCYTE ESTERASE, URINE: NEGATIVE
LYMPHOCYTES ABSOLUTE: 1.8 K/UL (ref 1–4.8)
LYMPHOCYTES RELATIVE PERCENT: 31.3 %
MCH RBC QN AUTO: 29.5 PG (ref 27–31.3)
MCHC RBC AUTO-ENTMCNC: 34.6 % (ref 33–37)
MCV RBC AUTO: 85.4 FL (ref 80–100)
MONOCYTES ABSOLUTE: 0.6 K/UL (ref 0.2–0.8)
MONOCYTES RELATIVE PERCENT: 10.5 %
NEUTROPHILS ABSOLUTE: 3 K/UL (ref 1.4–6.5)
NEUTROPHILS RELATIVE PERCENT: 52.9 %
NITRITE, URINE: NEGATIVE
PDW BLD-RTO: 12.7 % (ref 11.5–14.5)
PH UA: 6 (ref 5–9)
PLATELET # BLD: 193 K/UL (ref 130–400)
POTASSIUM SERPL-SCNC: 4.8 MEQ/L (ref 3.4–4.9)
PROTEIN UA: NEGATIVE MG/DL
PROTHROMBIN TIME: 13.7 SEC (ref 12.3–14.9)
RBC # BLD: 5.44 M/UL (ref 4.7–6.1)
SODIUM BLD-SCNC: 140 MEQ/L (ref 135–144)
SPECIFIC GRAVITY UA: 1.01 (ref 1–1.03)
TOTAL PROTEIN: 7.4 G/DL (ref 6.3–8)
UROBILINOGEN, URINE: 0.2 E.U./DL
WBC # BLD: 5.7 K/UL (ref 4.8–10.8)

## 2022-08-05 PROCEDURE — 80053 COMPREHEN METABOLIC PANEL: CPT

## 2022-08-05 PROCEDURE — 81003 URINALYSIS AUTO W/O SCOPE: CPT

## 2022-08-05 PROCEDURE — 85730 THROMBOPLASTIN TIME PARTIAL: CPT

## 2022-08-05 PROCEDURE — 85025 COMPLETE CBC W/AUTO DIFF WBC: CPT

## 2022-08-05 PROCEDURE — 85610 PROTHROMBIN TIME: CPT

## 2022-08-05 RX ORDER — SODIUM CHLORIDE 0.9 % (FLUSH) 0.9 %
5-40 SYRINGE (ML) INJECTION PRN
Status: CANCELLED | OUTPATIENT
Start: 2022-08-12

## 2022-08-05 RX ORDER — SODIUM CHLORIDE 0.9 % (FLUSH) 0.9 %
5-40 SYRINGE (ML) INJECTION EVERY 12 HOURS SCHEDULED
Status: CANCELLED | OUTPATIENT
Start: 2022-08-12

## 2022-08-05 RX ORDER — SODIUM CHLORIDE 9 MG/ML
INJECTION, SOLUTION INTRAVENOUS PRN
Status: CANCELLED | OUTPATIENT
Start: 2022-08-12

## 2022-08-05 RX ORDER — FEXOFENADINE HCL 180 MG/1
180 TABLET ORAL DAILY
COMMUNITY

## 2022-08-05 RX ORDER — SODIUM CHLORIDE, SODIUM LACTATE, POTASSIUM CHLORIDE, CALCIUM CHLORIDE 600; 310; 30; 20 MG/100ML; MG/100ML; MG/100ML; MG/100ML
INJECTION, SOLUTION INTRAVENOUS CONTINUOUS
Status: CANCELLED | OUTPATIENT
Start: 2022-08-12

## 2022-08-05 RX ORDER — LIDOCAINE HYDROCHLORIDE 10 MG/ML
1 INJECTION, SOLUTION INFILTRATION; PERINEURAL
Status: CANCELLED | OUTPATIENT
Start: 2022-08-12 | End: 2022-08-12

## 2022-08-12 ENCOUNTER — ANESTHESIA EVENT (OUTPATIENT)
Dept: OPERATING ROOM | Age: 32
End: 2022-08-12
Payer: COMMERCIAL

## 2022-08-12 ENCOUNTER — ANESTHESIA (OUTPATIENT)
Dept: OPERATING ROOM | Age: 32
End: 2022-08-12
Payer: COMMERCIAL

## 2022-08-12 ENCOUNTER — HOSPITAL ENCOUNTER (OUTPATIENT)
Age: 32
Setting detail: OUTPATIENT SURGERY
Discharge: HOME | End: 2022-08-12
Attending: ORTHOPAEDIC SURGERY | Admitting: ORTHOPAEDIC SURGERY
Payer: COMMERCIAL

## 2022-08-12 VITALS
TEMPERATURE: 98 F | SYSTOLIC BLOOD PRESSURE: 130 MMHG | DIASTOLIC BLOOD PRESSURE: 85 MMHG | HEART RATE: 70 BPM | OXYGEN SATURATION: 97 % | RESPIRATION RATE: 15 BRPM

## 2022-08-12 PROCEDURE — 7100000011 HC PHASE II RECOVERY - ADDTL 15 MIN: Performed by: ORTHOPAEDIC SURGERY

## 2022-08-12 PROCEDURE — 3600000013 HC SURGERY LEVEL 3 ADDTL 15MIN: Performed by: ORTHOPAEDIC SURGERY

## 2022-08-12 PROCEDURE — 6370000000 HC RX 637 (ALT 250 FOR IP): Performed by: ORTHOPAEDIC SURGERY

## 2022-08-12 PROCEDURE — 7100000001 HC PACU RECOVERY - ADDTL 15 MIN: Performed by: ORTHOPAEDIC SURGERY

## 2022-08-12 PROCEDURE — 3600000003 HC SURGERY LEVEL 3 BASE: Performed by: ORTHOPAEDIC SURGERY

## 2022-08-12 PROCEDURE — 2500000003 HC RX 250 WO HCPCS: Performed by: NURSE ANESTHETIST, CERTIFIED REGISTERED

## 2022-08-12 PROCEDURE — 6360000002 HC RX W HCPCS: Performed by: NURSE ANESTHETIST, CERTIFIED REGISTERED

## 2022-08-12 PROCEDURE — 7100000010 HC PHASE II RECOVERY - FIRST 15 MIN: Performed by: ORTHOPAEDIC SURGERY

## 2022-08-12 PROCEDURE — A4217 STERILE WATER/SALINE, 500 ML: HCPCS | Performed by: ORTHOPAEDIC SURGERY

## 2022-08-12 PROCEDURE — 2500000003 HC RX 250 WO HCPCS: Performed by: ORTHOPAEDIC SURGERY

## 2022-08-12 PROCEDURE — 7100000000 HC PACU RECOVERY - FIRST 15 MIN: Performed by: ORTHOPAEDIC SURGERY

## 2022-08-12 PROCEDURE — 3700000000 HC ANESTHESIA ATTENDED CARE: Performed by: ORTHOPAEDIC SURGERY

## 2022-08-12 PROCEDURE — 2580000003 HC RX 258: Performed by: ORTHOPAEDIC SURGERY

## 2022-08-12 PROCEDURE — 2709999900 HC NON-CHARGEABLE SUPPLY: Performed by: ORTHOPAEDIC SURGERY

## 2022-08-12 PROCEDURE — 6360000002 HC RX W HCPCS: Performed by: ORTHOPAEDIC SURGERY

## 2022-08-12 PROCEDURE — 2580000003 HC RX 258: Performed by: NURSE PRACTITIONER

## 2022-08-12 PROCEDURE — 3700000001 HC ADD 15 MINUTES (ANESTHESIA): Performed by: ORTHOPAEDIC SURGERY

## 2022-08-12 RX ORDER — SODIUM CHLORIDE 0.9 % (FLUSH) 0.9 %
5-40 SYRINGE (ML) INJECTION PRN
Status: DISCONTINUED | OUTPATIENT
Start: 2022-08-12 | End: 2022-08-12 | Stop reason: HOSPADM

## 2022-08-12 RX ORDER — SODIUM CHLORIDE 9 MG/ML
25 INJECTION, SOLUTION INTRAVENOUS PRN
Status: DISCONTINUED | OUTPATIENT
Start: 2022-08-12 | End: 2022-08-12 | Stop reason: HOSPADM

## 2022-08-12 RX ORDER — OXYCODONE HYDROCHLORIDE 5 MG/1
5 TABLET ORAL PRN
Status: DISCONTINUED | OUTPATIENT
Start: 2022-08-12 | End: 2022-08-12 | Stop reason: HOSPADM

## 2022-08-12 RX ORDER — MIDAZOLAM HYDROCHLORIDE 1 MG/ML
INJECTION INTRAMUSCULAR; INTRAVENOUS PRN
Status: DISCONTINUED | OUTPATIENT
Start: 2022-08-12 | End: 2022-08-12 | Stop reason: SDUPTHER

## 2022-08-12 RX ORDER — FENTANYL CITRATE 50 UG/ML
INJECTION, SOLUTION INTRAMUSCULAR; INTRAVENOUS PRN
Status: DISCONTINUED | OUTPATIENT
Start: 2022-08-12 | End: 2022-08-12 | Stop reason: SDUPTHER

## 2022-08-12 RX ORDER — ONDANSETRON 2 MG/ML
4 INJECTION INTRAMUSCULAR; INTRAVENOUS
Status: DISCONTINUED | OUTPATIENT
Start: 2022-08-12 | End: 2022-08-12 | Stop reason: HOSPADM

## 2022-08-12 RX ORDER — MULTIVITAMIN
1 TABLET ORAL DAILY
COMMUNITY

## 2022-08-12 RX ORDER — DIPHENHYDRAMINE HYDROCHLORIDE 50 MG/ML
12.5 INJECTION INTRAMUSCULAR; INTRAVENOUS
Status: DISCONTINUED | OUTPATIENT
Start: 2022-08-12 | End: 2022-08-12 | Stop reason: HOSPADM

## 2022-08-12 RX ORDER — SODIUM CHLORIDE 9 MG/ML
INJECTION, SOLUTION INTRAVENOUS PRN
Status: DISCONTINUED | OUTPATIENT
Start: 2022-08-12 | End: 2022-08-12 | Stop reason: HOSPADM

## 2022-08-12 RX ORDER — DEXAMETHASONE SODIUM PHOSPHATE 10 MG/ML
INJECTION INTRAMUSCULAR; INTRAVENOUS PRN
Status: DISCONTINUED | OUTPATIENT
Start: 2022-08-12 | End: 2022-08-12 | Stop reason: SDUPTHER

## 2022-08-12 RX ORDER — ONDANSETRON 2 MG/ML
INJECTION INTRAMUSCULAR; INTRAVENOUS PRN
Status: DISCONTINUED | OUTPATIENT
Start: 2022-08-12 | End: 2022-08-12 | Stop reason: SDUPTHER

## 2022-08-12 RX ORDER — SODIUM CHLORIDE 0.9 % (FLUSH) 0.9 %
5-40 SYRINGE (ML) INJECTION EVERY 12 HOURS SCHEDULED
Status: DISCONTINUED | OUTPATIENT
Start: 2022-08-12 | End: 2022-08-12 | Stop reason: HOSPADM

## 2022-08-12 RX ORDER — LIDOCAINE HYDROCHLORIDE 20 MG/ML
INJECTION, SOLUTION EPIDURAL; INFILTRATION; INTRACAUDAL; PERINEURAL PRN
Status: DISCONTINUED | OUTPATIENT
Start: 2022-08-12 | End: 2022-08-12 | Stop reason: SDUPTHER

## 2022-08-12 RX ORDER — MORPHINE SULFATE 4 MG/ML
4 INJECTION, SOLUTION INTRAMUSCULAR; INTRAVENOUS
Status: DISCONTINUED | OUTPATIENT
Start: 2022-08-12 | End: 2022-08-12 | Stop reason: HOSPADM

## 2022-08-12 RX ORDER — BUPIVACAINE HYDROCHLORIDE 2.5 MG/ML
INJECTION, SOLUTION EPIDURAL; INFILTRATION; INTRACAUDAL PRN
Status: DISCONTINUED | OUTPATIENT
Start: 2022-08-12 | End: 2022-08-12 | Stop reason: ALTCHOICE

## 2022-08-12 RX ORDER — MAGNESIUM HYDROXIDE 1200 MG/15ML
LIQUID ORAL CONTINUOUS PRN
Status: COMPLETED | OUTPATIENT
Start: 2022-08-12 | End: 2022-08-12

## 2022-08-12 RX ORDER — METOCLOPRAMIDE HYDROCHLORIDE 5 MG/ML
10 INJECTION INTRAMUSCULAR; INTRAVENOUS
Status: DISCONTINUED | OUTPATIENT
Start: 2022-08-12 | End: 2022-08-12 | Stop reason: HOSPADM

## 2022-08-12 RX ORDER — MEPERIDINE HYDROCHLORIDE 50 MG/ML
12.5 INJECTION INTRAMUSCULAR; INTRAVENOUS; SUBCUTANEOUS
Status: DISCONTINUED | OUTPATIENT
Start: 2022-08-12 | End: 2022-08-12 | Stop reason: HOSPADM

## 2022-08-12 RX ORDER — SCOLOPAMINE TRANSDERMAL SYSTEM 1 MG/1
1 PATCH, EXTENDED RELEASE TRANSDERMAL ONCE
Status: DISCONTINUED | OUTPATIENT
Start: 2022-08-12 | End: 2022-08-12 | Stop reason: HOSPADM

## 2022-08-12 RX ORDER — OXYCODONE HYDROCHLORIDE 5 MG/1
5 TABLET ORAL EVERY 4 HOURS PRN
Status: DISCONTINUED | OUTPATIENT
Start: 2022-08-12 | End: 2022-08-12 | Stop reason: HOSPADM

## 2022-08-12 RX ORDER — FENTANYL CITRATE 50 UG/ML
50 INJECTION, SOLUTION INTRAMUSCULAR; INTRAVENOUS EVERY 10 MIN PRN
Status: DISCONTINUED | OUTPATIENT
Start: 2022-08-12 | End: 2022-08-12 | Stop reason: HOSPADM

## 2022-08-12 RX ORDER — OXYCODONE HYDROCHLORIDE 5 MG/1
10 TABLET ORAL PRN
Status: DISCONTINUED | OUTPATIENT
Start: 2022-08-12 | End: 2022-08-12 | Stop reason: HOSPADM

## 2022-08-12 RX ORDER — PROPOFOL 10 MG/ML
INJECTION, EMULSION INTRAVENOUS PRN
Status: DISCONTINUED | OUTPATIENT
Start: 2022-08-12 | End: 2022-08-12 | Stop reason: SDUPTHER

## 2022-08-12 RX ORDER — SODIUM CHLORIDE, SODIUM LACTATE, POTASSIUM CHLORIDE, CALCIUM CHLORIDE 600; 310; 30; 20 MG/100ML; MG/100ML; MG/100ML; MG/100ML
INJECTION, SOLUTION INTRAVENOUS CONTINUOUS
Status: DISCONTINUED | OUTPATIENT
Start: 2022-08-12 | End: 2022-08-12 | Stop reason: HOSPADM

## 2022-08-12 RX ORDER — MORPHINE SULFATE 2 MG/ML
2 INJECTION, SOLUTION INTRAMUSCULAR; INTRAVENOUS
Status: DISCONTINUED | OUTPATIENT
Start: 2022-08-12 | End: 2022-08-12 | Stop reason: HOSPADM

## 2022-08-12 RX ORDER — LIDOCAINE HYDROCHLORIDE 10 MG/ML
1 INJECTION, SOLUTION INFILTRATION; PERINEURAL
Status: DISCONTINUED | OUTPATIENT
Start: 2022-08-12 | End: 2022-08-12 | Stop reason: HOSPADM

## 2022-08-12 RX ADMIN — ONDANSETRON 4 MG: 2 INJECTION INTRAMUSCULAR; INTRAVENOUS at 08:05

## 2022-08-12 RX ADMIN — CEFAZOLIN 2000 MG: 10 INJECTION, POWDER, FOR SOLUTION INTRAVENOUS at 07:45

## 2022-08-12 RX ADMIN — DEXAMETHASONE SODIUM PHOSPHATE 10 MG: 10 INJECTION INTRAMUSCULAR; INTRAVENOUS at 07:36

## 2022-08-12 RX ADMIN — FENTANYL CITRATE 100 MCG: 50 INJECTION INTRAMUSCULAR; INTRAVENOUS at 07:36

## 2022-08-12 RX ADMIN — SODIUM CHLORIDE, POTASSIUM CHLORIDE, SODIUM LACTATE AND CALCIUM CHLORIDE: 600; 310; 30; 20 INJECTION, SOLUTION INTRAVENOUS at 06:46

## 2022-08-12 RX ADMIN — MIDAZOLAM HYDROCHLORIDE 2 MG: 2 INJECTION, SOLUTION INTRAMUSCULAR; INTRAVENOUS at 07:30

## 2022-08-12 RX ADMIN — LIDOCAINE HYDROCHLORIDE 50 MG: 20 INJECTION, SOLUTION EPIDURAL; INFILTRATION; INTRACAUDAL; PERINEURAL at 07:36

## 2022-08-12 RX ADMIN — FENTANYL CITRATE 50 MCG: 50 INJECTION INTRAMUSCULAR; INTRAVENOUS at 08:07

## 2022-08-12 RX ADMIN — PROPOFOL 180 MG: 10 INJECTION, EMULSION INTRAVENOUS at 07:36

## 2022-08-12 RX ADMIN — FENTANYL CITRATE 50 MCG: 50 INJECTION INTRAMUSCULAR; INTRAVENOUS at 07:52

## 2022-08-12 RX ADMIN — PROPOFOL 20 MG: 10 INJECTION, EMULSION INTRAVENOUS at 07:51

## 2022-08-12 ASSESSMENT — PAIN - FUNCTIONAL ASSESSMENT
PAIN_FUNCTIONAL_ASSESSMENT: NONE - DENIES PAIN
PAIN_FUNCTIONAL_ASSESSMENT: NONE - DENIES PAIN

## 2022-08-12 NOTE — PROGRESS NOTES
Patient ID:  Sergo Burciaga  [id number]  45 y.o.  1990  Patient received in PACU BED 2 Report received from Anesthesia and Surgical Nurse. Attached to Monitor, VSS, See Nursing Assessment and Flowsheets for detailed Information  Awake, following commands, answering questions appropriately. O2 Sats>92 on Room air  Taking po fluids well, VSS. IV Discontinued per protocol, catheter intact, patient tolerated well. Discharge Instructions given, patient verbalized understanding.   Pt to be discharged to home with responsible adult     Electronically signed by Miguel A Beltran RN on 8/12/22

## 2022-08-12 NOTE — ANESTHESIA POSTPROCEDURE EVALUATION
Department of Anesthesiology  Postprocedure Note    Patient: Duane Vann  MRN: [id number]  YOB: 1990  Date of evaluation: 8/12/2022      Procedure Summary     Date: 08/12/22 Room / Location: [address] [place] [address]    Anesthesia Start: 0730 Anesthesia Stop: 0815    Procedure: RIGHT KNEE ARTHROSCOPY LATERAL AND MEDIAL MENISCECTOMY, ARTHROSCOPY KNEE MEDIAL AND LATERAL MENISCECTOMY (Right: Knee) Diagnosis:       Acute lateral meniscus tear of right knee, initial encounter      Acute medial meniscus tear of right knee, initial encounter      (RIGHT LATERAL MENISCUS TEAR AND MEDIAL MENISCUS TEAR)    Surgeons: Romayne Plumber, MD Responsible Provider: Brooke Hanna MD    Anesthesia Type: general ASA Status: 2          Anesthesia Type: No value filed.     Taylor Phase I: Taylor Score: 10    Taylor Phase II:        Anesthesia Post Evaluation    Patient location during evaluation: PACU  Level of consciousness: awake  Pain score: 0  Airway patency: patent  Nausea & Vomiting: no vomiting and no nausea  Complications: no  Cardiovascular status: hemodynamically stable  Respiratory status: acceptable  Hydration status: stable

## 2022-08-12 NOTE — H&P
Interval History and Physical    I have interviewed and examined the patient and reviewed the recent History and Physical.  There have been no changes to the recent H&P documentation. No change in ROS or PE. Pt's allergies reviewed and no change List of meds on original H&P    No significant findings with ROS, family hx, social  Hx, ALL, surgical hx, med list or medical history     The patient understands the planned operation and its associated risks and benefits and agrees to proceed. The surgical consent form has been signed.     BP (!) 136/90   Pulse 83   Temp 99 °F (37.2 °C) (Oral)   Resp 20   SpO2 100%      Electronically signed by Joel Johnson MD on 8/12/2022 at 7:25 AM

## 2022-08-12 NOTE — ANESTHESIA PRE PROCEDURE
Department of Anesthesiology  Preprocedure Note       Name:  Ernie Wheatley   Age:  29 y.o.  :  1990                                          MRN:  [id number]         Date:  2022      Surgeon: Esdras Ventura):  Kacy Adams MD    Procedure: Procedure(s):  RIGHT KNEE ARTHROSCOPY LATERAL AND MEDIAL MENISCECTOMY, ARTHROSCOPY KNEE MEDIAL AND LATERAL MENISCECTOMY    Medications prior to admission:   Prior to Admission medications    Medication Sig Start Date End Date Taking? Authorizing Provider   Multiple Vitamin (MULTI-VITAMIN DAILY) TABS Take 1 tablet by mouth daily    Historical Provider, MD   Cholecalciferol 100 MCG (4000 UT) CAPS Take by mouth    Historical Provider, MD   fexofenadine (ALLEGRA) 180 MG tablet Take 180 mg by mouth in the morning.     Historical Provider, MD   venlafaxine (EFFEXOR XR) 150 MG extended release capsule Take 1 capsule by mouth daily 3/16/22   Ally Valle MD   ondansetron [facility]) 4 MG tablet Take 1 tablet by mouth daily as needed for Nausea or Vomiting 21   Ally Valle MD   MULTIPLE VITAMIN PO Take by mouth    Historical Provider, MD   Probiotic Product (PROBIOTIC DAILY PO) Take 300 mg by mouth    Historical Provider, MD       Current medications:    Current Facility-Administered Medications   Medication Dose Route Frequency Provider Last Rate Last Admin    ceFAZolin (ANCEF) 2000 mg in dextrose 5 % 100 mL IVPB  2,000 mg IntraVENous Once Kacy Adams MD        lidocaine 1 % injection 1 mL  1 mL IntraDERmal Once PRN Thomas [place], APRN - CNP        lactated ringers infusion   IntraVENous Continuous Thomas [place], APRN -  mL/hr at 22 0646 New Bag at 22 0646    sodium chloride flush 0.9 % injection 5-40 mL  5-40 mL IntraVENous 2 times per day Thomas [place], APRN - CNP        sodium chloride flush 0.9 % injection 5-40 mL  5-40 mL IntraVENous PRN Thomas [place], APRN - CNP        0.9 % sodium chloride infusion   IntraVENous GORDO Clark APRN - CNP        scopolamine (TRANSDERM-SCOP) transdermal patch 1 patch  1 patch TransDERmal Once Pebbles Navarrete MD   1 patch at 22 2950       Allergies: Allergies   Allergen Reactions    Latex      Added based on information entered during log entry, please review and add reactions, type, and severity as needed    Pcn [Penicillins] Anaphylaxis    Tessalon [Benzonatate] Anaphylaxis    Amoxicillin Itching     pt finished 10 day course of Amoxicillin and proceeded to have itching to bilateral hands, arms, neck, and top of head without any visible rash       Problem List:    Patient Active Problem List   Diagnosis Code    Sleep apnea G47.30       Past Medical History:        Diagnosis Date    Heart palpitations     PONV (postoperative nausea and vomiting)        Past Surgical History:        Procedure Laterality Date    ANTERIOR CRUCIATE LIGAMENT REPAIR Right 10/2/2020    RIGHT: ARTHROSCOPY KNEE ANTERIOR CRUCIATE LIGAMENT RECONSTRUCTION AND  MEDIAL + LATERAL MENISCENTOMY performed by Pebbles Navarrete MD at [address] [place] [address] TYMPANOSTOMY TUBE PLACEMENT      WISDOM TOOTH EXTRACTION         Social History:    Social History     Tobacco Use    Smoking status: Former     Packs/day: 0.25     Types: Cigarettes     Quit date: 2015     Years since quittin.8    Smokeless tobacco: Never   Substance Use Topics    Alcohol use:  No                                Counseling given: Not Answered      Vital Signs (Current):   Vitals:    22 0643   BP: (!) 136/90   Pulse: 83   Resp: 20   Temp: 99 °F (37.2 °C)   TempSrc: Oral   SpO2: 100%                                              BP Readings from Last 3 Encounters:   22 (!) 136/90   22 (!) 148/82   22 128/80       NPO Status: Time of last liquid consumption:                         Time of last solid consumption:                         Date of last liquid consumption: 08/11/22                        Date of last solid food consumption: 08/11/22    BMI:   Wt Readings from Last 3 Encounters:   08/05/22 264 lb (119.7 kg)   03/16/22 248 lb (112.5 kg)   12/14/21 233 lb (105.7 kg)     There is no height or weight on file to calculate BMI.    CBC:   Lab Results   Component Value Date/Time    WBC 5.7 08/05/2022 11:49 AM    RBC 5.44 08/05/2022 11:49 AM    HGB 16.1 08/05/2022 11:49 AM    HCT 46.4 08/05/2022 11:49 AM    MCV 85.4 08/05/2022 11:49 AM    RDW 12.7 08/05/2022 11:49 AM     08/05/2022 11:49 AM       CMP:   Lab Results   Component Value Date/Time     08/05/2022 11:48 AM    K 4.8 08/05/2022 11:48 AM     08/05/2022 11:48 AM    CO2 27 08/05/2022 11:48 AM    BUN 17 08/05/2022 11:48 AM    CREATININE 1.00 08/05/2022 11:48 AM    GFRAA >60.0 08/05/2022 11:48 AM    LABGLOM >60.0 08/05/2022 11:48 AM    GLUCOSE 80 08/05/2022 11:48 AM    PROT 7.4 08/05/2022 11:48 AM    CALCIUM 9.8 08/05/2022 11:48 AM    BILITOT 0.6 08/05/2022 11:48 AM    ALKPHOS 78 08/05/2022 11:48 AM    AST 21 08/05/2022 11:48 AM    ALT 19 08/05/2022 11:48 AM       POC Tests: No results for input(s): POCGLU, POCNA, POCK, POCCL, POCBUN, POCHEMO, POCHCT in the last 72 hours. Coags:   Lab Results   Component Value Date/Time    PROTIME 13.7 08/05/2022 11:49 AM    INR 1.1 08/05/2022 11:49 AM    APTT 36.7 08/05/2022 11:49 AM       HCG (If Applicable): No results found for: PREGTESTUR, PREGSERUM, HCG, HCGQUANT     ABGs: No results found for: PHART, PO2ART, UDS5BZR, BRL8ZFC, BEART, A7GMZXGP     Type & Screen (If Applicable):  No results found for: LABABO, LABRH    Drug/Infectious Status (If Applicable):  No results found for: HIV, HEPCAB    COVID-19 Screening (If Applicable):   Lab Results   Component Value Date/Time    COVID19 Not Detected 12/17/2020 03:55 PM           Anesthesia Evaluation     history of anesthetic complications: PONV.   Airway: Mallampati: II  TM distance: >3 FB   Neck ROM: full  Mouth opening: > = 3 FB   Dental: normal exam         Pulmonary: breath sounds clear to auscultation  (+) sleep apnea:                             Cardiovascular:Negative CV ROS            Rhythm: regular                      Neuro/Psych:   Negative Neuro/Psych ROS              GI/Hepatic/Renal: Neg GI/Hepatic/Renal ROS            Endo/Other: Negative Endo/Other ROS                    Abdominal:             Vascular: negative vascular ROS. Other Findings:           Anesthesia Plan      general     ASA 2     (LMA  PONV prophylaxis ; scopolamine patch)  Induction: intravenous. MIPS: Postoperative opioids intended and Prophylactic antiemetics administered. Anesthetic plan and risks discussed with patient. Plan discussed with CRNA.     Attending anesthesiologist reviewed and agrees with Preprocedure content                Guru Del Castillo MD   8/12/2022

## 2022-08-12 NOTE — OP NOTE
[facility], [address]                                OPERATIVE REPORT    PATIENT NAME: January Pitt                     :        1990  MED REC NO:   [id number]                              ROOM:  ACCOUNT NO:   [de-identified]                           ADMIT DATE: 2022  PROVIDER:     Prashant Aguilar MD    DATE OF PROCEDURE:  2022    SURGEON:  Prashant Aguilar MD    ASSISTANT:  KJ Basurto PA-C, was present throughout the entire case. Given the  nature of the disease process and the procedure, a skilled surgical  first assistant was necessary during the case. The assistant was  necessary to hold retractors and manipulate the extremity during the  procedure. A certified scrub tech was at the back table managing the  instruments and supplies for the surgical case. PREOPERATIVE DIAGNOSIS:  Right medial and lateral meniscus tear. POSTOPERATIVE DIAGNOSIS:  Right medial and lateral meniscus tear. PROCEDURE:  Right knee arthroscopic medial and lateral mastectomy. BLOOD LOSS:  Minimal.    FLUIDS:  Less than 2 liters. ANESTHESIA:  General.    COMPLICATIONS:  None. INDICATION:  A 45-year-old male with ongoing knee pain. MRI shows  meniscus tearing, prior ACL reconstruction with an intact ACL ligament,  now for arthroscopy. DESCRIPTION OF PROCEDURE:  The patient was brought to the operating room  at [facility], induction of anesthesia, routine prep and  drape. No tourniquet was used or placed. Exam under anesthesia showed  stable knee in all planes checked. There was no rotatory instability. Anterior and posterior drawer was negative. There was no pivot shift. At this time, standard portals were established around the patellar  tendon. We sequentially examined the knee. Patellofemoral joint was cleaned. Medial gutters were empty.   In the  figure-of-four position, lateral joint space was inspected. There was  some anterior lateral meniscus tearing and a direct lateral meniscus  tear involving the inner 20% of the meniscus. A partial meniscectomy  was completed. Stable circumferential rim fibers were left intact. The  chondral surfaces were otherwise pristine of the lateral compartment. In the notch position, the ACL was photo documented and this was found  to be pristine and intact without any impingement and it is in good  position. In the medial joint space with valgus maneuver, there was a posterior  horn meniscal tearing which was trimmed. Stable circumferential fibers  were left intact and then there was a transition zone directly medially  and then there was some anterior medial meniscus tearing as well that  smoothed and debrided with a little bit of chondral area that has a  grade 2 change over the weightbearing surface that was smoothed and  debrided for photo documentation only. After completing the medial  meniscus partial resection, photo document completed. The knee was  drained of all loose material.  Portals were closed with nylon sutures. Marcaine in the portal sites for comfort and compressive dressing. The  patient was transferred to recovery room.         Negrito Adkins MD    D: 08/12/2022 8:13:08       T: 08/12/2022 8:16:39     [email or web address]  Job#: [id number]     Doc#: [id number]    CC:

## 2022-08-12 NOTE — DISCHARGE INSTRUCTIONS
Medication given may have significant effects after discharge. Therefore on the day of surgery:  1) you must be accompanied by a responsible adult upon discharge and for 24 hours after surgery. Do not drive a motor vehicle, operate machinery, power tools or appliance, drink alcoholic beverages, or make critical decisions for 24 hours  2) Be aware of dizziness, which may cause a fall. Change positions slowly. 3) Eating: you may resume your regular diet but it is better to increase intake slowly with mild foods and working up to your regular diet. No greasy, fried or spicy foods today. 4) Nausea/Vomiting: Nausea and vomiting may occur as you become more active or begin to increase food intake. If this should happen, decrease activity and return to liquids. If the problem persists, call your surgeon  5) Pain: Your surgeon may have given you a prescription for pain medication. Take pain medication with food as prescribed. Pain medication may cause constipation, so drink plenty of fluids. If your pain medication does not provide adequate relief, call your surgeon  6) Urinating: Notify your surgeon if you have not urinated within 12 hours after discharge  7) Ice: Apply ice to operative site for 20 min 5-6 times a day or use Polar care as instructed  8) Dressing:   [x]   Remove dressing in 24hr    []  Remove dressing in 48hr   [x]  Leave open to air after initial dressing is taken off and incision is dry  Do not remove the steri-strips. (no bath/ hot tubs/ pools)   []  Leave dressing in place.  Keep dressing/ incision clean and dry    9) Activity    Shoulder/ elbow/Hand   []  Elevate extremity    []  Sling   [] at all times (except for exercises and showering)  [] as needed only for comfort   [] Begin daily motion exercises out of sling as instructed   []  Bend and flex fingers/ wrist/elbow frequently   [] other   Knee/ Ankle/ Foot   [x] elevate extremity   [x] crutches        [] non-weight bearing to operative extremity  [] partial weight bearing    [x] Full weight bearing as tolerated but use  crutches for  5 day   []  Use brace whenever walking   [] other      10) Begin physical therapy if advised by you physician:   [x] before returning to see you doctor if possible   [] not until you follow up with your doctor    11) call your doctor at [phone] for an appointment (or follow up as scheduled)    [place] [id number] [facility] office if  Increased redness, swelling, drainage of any kind, and/or pain to surgery site. As well as new onset fevers and or chills. These could signify an infection. Calf or thigh tenderness to touch as well as increased swelling or redness. This could signify a clot formation. Numbness or tingling to an area around the incision site or below the incision site (toes). Or if the operative extremity becomes cold, blue. Any rash appears, increased  or new onset nausea/vomiting occur. This may indicate a reaction to a medication. Temp is 38.5 C (101F)  12) If you have any concerns or questions, please call [facility] surgeon on call.  The 24- hour phone is [phone]) If you are unable to contact your surgeon, in an emergency situation, go to the nearest hospital

## 2022-08-15 ENCOUNTER — HOSPITAL ENCOUNTER (OUTPATIENT)
Dept: PHYSICAL THERAPY | Age: 32
Setting detail: THERAPIES SERIES
Discharge: HOME | End: 2022-08-15
Payer: COMMERCIAL

## 2022-08-15 PROCEDURE — 97161 PT EVAL LOW COMPLEX 20 MIN: CPT

## 2022-08-15 PROCEDURE — 97110 THERAPEUTIC EXERCISES: CPT

## 2022-08-15 ASSESSMENT — PAIN DESCRIPTION - LOCATION: LOCATION: LEG;KNEE

## 2022-08-15 ASSESSMENT — PAIN SCALES - GENERAL: PAINLEVEL_OUTOF10: 0

## 2022-08-15 ASSESSMENT — PAIN DESCRIPTION - FREQUENCY: FREQUENCY: INTERMITTENT

## 2022-08-15 ASSESSMENT — PAIN DESCRIPTION - PAIN TYPE: TYPE: SURGICAL PAIN

## 2022-08-15 ASSESSMENT — PAIN DESCRIPTION - ORIENTATION: ORIENTATION: RIGHT

## 2022-08-15 NOTE — PROGRESS NOTES
device to improve functional mobility and household tasks. Stairs  # Steps : 4  Stairs Height: 6\"  Rails: Bilateral  Device: Crutches  Assistance: Independent  Comment: non-reciprocal stair negotiation with eamon axillary crutches. New   Long term goal 4: Patient will improve LEFS by >/=10 points to correlate to [employer] to demonstrate increased QOL LEFS: 18/80 New       Body Structures, Functions, Activity Limitations Requiring Skilled Therapeutic Intervention: Decreased functional mobility , Decreased ROM, Decreased strength  Assessment: Patient is a 28 yo male s/p R knee menisecotomy on 8/12/22. Patient ambulated with eamon axillary crutches, with WBAT. Patient demonstrated minimal gait deficits. He demonstrated some dififculty with stair negotiation. Patient presented with , no swelling, no TTP, and mild pain after inc movement. He presented with R LE strength and ROM deficits compared to opposite extremitiy. Patient presented with visible muscle fatigue and decreased quadriceps strength during SLR. Skilled therapy is indicated to improve stated deficits to improve QOL and return to PLOF. Therapy Prognosis: Good, Excellent      PT Education: Goals;PT Role;Plan of Care;Evaluative findings; Insurance;Home Exercise Program    PLAN: [x] Evaluate and Treat  Frequency/Duration:  Plan Frequency: 1x  Plan weeks: 8-10  Current Treatment Recommendations: Strengthening, ROM, Balance training, Functional mobility training, Endurance training, Stair training, Gait training, Neuromuscular re-education, Manual Therapy - Soft Tissue Mobilization, Manual Therapy - Joint Manipulation, Pain management, Home exercise program, Safety education & training, Modalities, Therapeutic activities     Precautions:Restrictions/Precautions: Weight Bearing                           Patient Status:[x] Continue/ Initiate plan of Care    [] Discharge PT. Recommend pt continue with HEP.      [] Additional visits requested, Please re-certify for additional visits:    [] Hold         Signature: Electronically signed by Margarita Coronado PT on 8/15/22 at 4:05 PM EDT      If you have any questions or concerns, please don't hesitate to call.   Thank you for your referral.

## 2022-08-15 NOTE — PROGRESS NOTES
[facility]  PHYSICAL THERAPY EVALUATION      Physical Therapy: Initial Evaluation    Patient: Fredi Booker (81 y.o.     male)   Examination Date: 08/15/2022   :  1990 ;    Confirmed: Yes MRN: [id number]  CSN: [id number]   Insurance: Payor: [address] [place] [address] / Plan: [address] [place], [place] [address] [place]GE LU[place] [id number] / Product Type: *No Product type* /   Insurance ID: [id number] - (Commercial) Secondary Insurance (if applicable):    Referring Physician: Melida Smith MD      PCP: Jarred Mon MD Visits to Date/Visits Approved:     No Show/Cancelled Appts: 0      Medical Diagnosis: Other tear of medial meniscus, current injury, unspecified knee, initial encounter Gina Pabon  Encounter for procedure for purposes other than remedying health state, unspecified [Z41.9]    Treatment Diagnosis: Decreased R knee ROM, Decreased R LE strength     PERTINENT MEDICAL HISTORY   Patient Assessed for Rehabilitation Services: Yes       Medical History: Chart Reviewed: Yes   Past Medical History:   Diagnosis Date    Heart palpitations     PONV (postoperative nausea and vomiting)      Surgical History:   Past Surgical History:   Procedure Laterality Date    ANTERIOR CRUCIATE LIGAMENT REPAIR Right 10/2/2020    RIGHT: ARTHROSCOPY KNEE ANTERIOR CRUCIATE LIGAMENT RECONSTRUCTION AND  MEDIAL + LATERAL MENISCENTOMY performed by Desiree Oquendo MD at [place] [address] ARTHROSCOPY Right 2022    RIGHT KNEE ARTHROSCOPY LATERAL AND MEDIAL MENISCECTOMY, ARTHROSCOPY KNEE MEDIAL AND LATERAL MENISCECTOMY performed by Desiree Oquendo MD at [place] [address] EXTRACTION         Medications:   Current Outpatient Medications:     Multiple Vitamin (MULTI-VITAMIN DAILY) TABS, Take 1 tablet by mouth daily, Disp: , Rfl:     Cholecalciferol 100 MCG (4000 UT) CAPS, Take by mouth, Disp: , Rfl:     fexofenadine (ALLEGRA) 180 MG tablet, Take 180 mg by mouth in the morning., Disp: , Rfl:     venlafaxine (EFFEXOR XR) 150 MG extended release capsule, Take 1 capsule by mouth daily, Disp: 90 capsule, Rfl: 3    ondansetron (ZOFRAN) 4 MG tablet, Take 1 tablet by mouth daily as needed for Nausea or Vomiting, Disp: 30 tablet, Rfl: 0    MULTIPLE VITAMIN PO, Take by mouth, Disp: , Rfl:     Probiotic Product (PROBIOTIC DAILY PO), Take 300 mg by mouth, Disp: , Rfl:   Allergies: Latex, Pcn [penicillins], Tessalon [benzonatate], and Amoxicillin      SUBJECTIVE EXAMINATION     History obtained from[de-identified] Patient, Chart Review,      Family/Caregiver Present: No    Subjective History: Onset Date: 08/12/22  Subjective: patient reports to therapy s/p menisectomy on 8/12/22, previous ACL reconstruction abou t2 years ago and states imaging reports it looks good. Ambulates on eamon crutches. Patient reports very little pain on R knee. States he follows-up with Lynette Mathew on Wednesday.   Additional Pertinent Hx (if applicable): chronic pain, psych/emo issues, ear infections, hearing loss, head injury, ACL reconstructoin 10/02/20   Prior diagnostic testing[de-identified] MRI, X-ray  Previous treatments prior to current episode?: Surgery, Outpatient PT, Brace      Learning/Language: Learning  Does the patient/guardian have any barriers to learning?: No barriers  Will there be a co-learner?: No  What is the preferred language of the patient/guardian?: English  Is an  required?: No  How does the patient/guardian prefer to learn new concepts?: Listening, Reading, Demonstration, Pictures/Videos     Pain Screening    Pain Screening  Patient Currently in Pain: Denies  Pain Assessment: 0-10  Pain Level: 0  Pain Type: Surgical pain  Pain Location: Leg, Knee  Pain Orientation: Right  Pain Frequency: Intermittent  Aggravating factors: Walking, Movement  Pain Management/Relieving Factors: Rest, Ice, Elevation    Functional Status    Social History:    Social History  Lives With: Alone  Type of Home: House  Home Layout: Two level, Laundry in basement  Home Access: Stairs to enter without rails  Entrance Stairs - Number of Steps: 3    Occupation/Interests:   Occupation: Full time employment  Type of Occupation: Manager for [employer]  Job Duties: Prolonged sitting  Leisure & Hobbies: Drums, Exercising    Prior Level of Function:     Independent        Current Level of Function:          Receives Help From: Family  ADL Assistance: Independent  Homemaking Assistance: Needs assistance (Mom helps currently d/t crutches)  Homemaking Responsibilities: Yes  Ambulation Assistance: Independent  Transfer Assistance: Independent  Active : Yes (not currently d/t surgery)         OBJECTIVE EXAMINATION     Restrictions:   Restrictions/Precautions: Weight Bearing     WB Status: WBAT    Review of Systems:  Vision: Impaired  Visual Deficits: Wears glasses  Hearing: Within functional limits (slight hearing loss)  Overall Orientation Status: Within Functional Limits  Follows Commands: Within Functional Limits      Observations:   General Observations  General Observations: Yes  Description: Ambulated with eamon axillary crutches, steri strips on R knee    Palpation:   Right Knee Palpation: no TTP  Left Knee Palpation: no TTP    Mobility:   Ambulation  WB Status: WBAT  Ambulation  Surface: carpet  Device: Axillary Crutches  Assistance: Independent  Quality of Gait: no sigificant gait devitations to note,  Gait Deviations: Slow Ruma  Distance: clinical distance  More Ambulation?: No  Stairs/Curb  Stairs?: Yes  Stairs  # Steps : 4  Stairs Height: 6\"  Rails: Bilateral  Device: Crutches  Assistance: Independent  Comment: non-reciprocal stair negotiation with eamon axillary crutches. Neuro Screen:  mild n/t noted over ACL incision site.     Left AROM  Right AROM         AROM LLE (degrees)  L Knee Flexion 0-145: 138  L Knee Extension 0: 0    AROM RLE (degrees)  R Knee Flexion 0-145: 120  R Knee Extension 0: -5 Left Strength  Right Strength      General Strength Testing LE: Other (comment) (muscle fatigue and minimal quad lag noted during 10 SLR on RLE)  Strength LLE  L Hip Flexion: 5/5  L Hip Extension: 4/5  L Hip ABduction: 5/5  L Knee Flexion: 5/5  L Knee Extension: 5/5 General Strength Testing LE: Other (comment) (muscle fatigue and minimal quad lag noted during 10 SLR on RLE)  Strength RLE  R Hip Flexion: 4+/5  R Hip Extension: 4/5  R Hip ABduction: 4/5  R Knee Flexion: 4+/5  R Knee Extension: 4+/5   Muscle Length/Flexibility:   Muscle Length LE  90/90 SLR (Hamstring Tightness): mild length deficits in R 90-90 - WFL  Right Hamstrings: Tight (mildly tight)  Right Rectus Femoris: Other (comment (limited d/t surgical procedure)  Left Hamstrings: WNL  Left Rectus Femoris: WNL    Joint Mobility:   Joint Integrity Knee  Right Patella Glides: Hypomobile in all directions  Left Patella Glides: WNL    Special Tests:   Special Tests for Knee  Special Tests: N/A    Outcomes Score:  Exam: LEFS: 18/80       Treatment:    Exercises:   Exercises  Exercise 1: Quad Sets 10x10\" holds  Exercise 2: S/L hip abduction/adduction x10 eamon each  Exercise 3: strap assisted heel slides 10x3-5 sec holds  Exercise 4: mini squats at mat table (24\") 2x10  Exercise 20: HEP: quad sets, hip abd/add, heel slides     Modalities:  Modalities: Yes  Cryotherapy (CPT 90302)  Patient Position: Supine (game ready - no compression)  Number Minutes Cryotherapy: 10  Cryotherapy location: Right, Knee  Post treatment skin assessment: Intact     *Indicates exercise,modality, or manual techniques to be initiated when appropriate       ASSESSMENT     Impression: Assessment: Patient is a 30 yo male s/p R knee menisecotomy on 8/12/22. Patient ambulated with eamon axillary crutches, with WBAT. Patient demonstrated minimal gait deficits. He demonstrated some dififculty with stair negotiation. Patient presented with , no swelling, no TTP, and mild pain after inc movement. He presented with R LE strength and ROM deficits compared to opposite extremitiy. Patient presented with visible muscle fatigue and decreased quadriceps strength during SLR. Skilled therapy is indicated to improve stated deficits to improve QOL and return to PLOF. Body Structures, Functions, Activity Limitations Requiring Skilled Therapeutic Intervention: Decreased functional mobility , Decreased ROM, Decreased strength    Statement of Medical Necessity: Physical Therapy is both indicated and medically necessary as outlined in the POC to increase the likelihood of meeting the functionally related goals stated below. Patient's Activity Tolerance:        Patient's rehabilitation potential/prognosis is considered to be: Good, Excellent    Factors which may impact rehabilitation potential include: None  Measures taken to address barrier(s): N/A  Patient Education: Goals, PT Role, Plan of Care, Evaluative findings, Insurance, Home Exercise Program      GOALS   Patient Goal(s): Patient goals : \"Get back to normal\"    Short Term Goals Completed by 2 weeks Goal Status   Patient will be indep with HEP New       Long Term Goals Completed by 8-10 weeks Goal Status   Patient will improve R knee rom to 0-140 deg to increase stair negotiation New   Patient will improve R hip and knee strength to 5/5 to improve endurance and gait mechanics in the community New   Patient will demonstrate reciprocal stair negotiation with no device to improve functional mobility and household tasks.  New   Patient will improve LEFS by >/=10 points to correlate to [employer] to demonstrate increased QOL New          TREATMENT PLAN   PT Equipment Recommendations  Equipment Needed: No   Requires PT Follow-Up: Yes    Treatment may include any combination of the following: Strengthening, ROM, Balance training, Functional mobility training, Endurance training, Stair training, Gait training, Neuromuscular re-education, Manual Therapy - Soft Tissue Mobilization, Manual Therapy - Joint Manipulation, Pain management, Home exercise program, Safety education & training, Modalities, Therapeutic activities     Frequency / Duration:  Patient to be seen 1x times per week for 8-10 weeks  Plan Comment:               Eval Complexity:   Decision Making: Low Complexity  History: Personal Factors and/or Comorbidities Impacting POC: High  History: chronic pain, psych/emo issues, ear infections, hearing loss, head injury, ACL reconstructoin 10/02/20  Examination of body system(s) including body structures and functions, activity limitations, and/or participation restrictions: Low  Exam: LEFS: 18/80  Clinical Presentation: Low  Clinical Presentation: stable    POST-PAIN     Pain Rating (0-10 pain scale):   0/10  Location and pain description same as pre-treatment unless indicated. Action: [] NA  [] Call Physician  [x] Perform HEP  [] Meds as prescribed    Evaluation and patient rights have been reviewed and patient agrees with plan of care. Yes  [x]  No  []   Explain:     Marks Fall Risk Assessment  Risk Factor Scale  Score   History of Falls [] Yes  [x] No 25  0 0   Secondary Diagnosis [] Yes  [x] No 15  0 0   Ambulatory Aid [] Furniture  [x] Crutches/cane/walker  [] None/bedrest/wheelchair/nurse 30  15  0 15   IV/Heparin Lock [] Yes  [x] No 20  0 0   Gait/Transferring [] Impaired  [] Weak  [x] Normal/bedrest/immobile 20  10  0 0   Mental Status [] Forgets limitations  [x] Oriented to own ability 15  0 0      Total:15     Based on the Assessment score: check the appropriate box.   [x]  No intervention needed   Low =   Score of 0-24  []  Use standard prevention interventions Moderate =  Score of 24-44   [] Discuss fall prevention strategies   [] Indicate moderate falls risk on eval  []  Use high risk prevention interventions High = Score of 45 and higher   [] Discuss fall prevention strategies   [] Provide supervision during treatment time      Minutes:  PT Individual Minutes  Time In: 1300  Time Out: 5673  Minutes: 48  Timed Code Treatment Minutes: 15 Minutes  Procedure Minutes:23 min evaluation; 10 min CP     Timed Activity Minutes Units   Ther Ex 15 1       Electronically signed by Shanthi Mcgovern PT on 8/15/22 at 2:08 PM EDT

## 2022-08-22 ENCOUNTER — HOSPITAL ENCOUNTER (OUTPATIENT)
Dept: PHYSICAL THERAPY | Age: 32
Setting detail: THERAPIES SERIES
Discharge: HOME | End: 2022-08-22
Payer: COMMERCIAL

## 2022-08-22 PROCEDURE — 97016 VASOPNEUMATIC DEVICE THERAPY: CPT

## 2022-08-22 PROCEDURE — 97110 THERAPEUTIC EXERCISES: CPT

## 2022-08-22 ASSESSMENT — PAIN SCALES - GENERAL: PAINLEVEL_OUTOF10: 4

## 2022-08-22 NOTE — PROGRESS NOTES
[facility]  Outpatient Physical Therapy    Treatment Note        Date: 2022  Patient: Argenis Poster  : 1990   Confirmed: Yes  MRN: [id number]  Referring Provider: Gonsalo Roche MD  Secondary Referring Provider (If applicable):     Medical Diagnosis: Other tear of medial meniscus, current injury, unspecified knee, initial encounter [S83.249A]  Encounter for procedure for purposes other than remedying health state, unspecified [Z41.9]    Treatment Diagnosis: Decreased R knee ROM, Decreased R LE strength    Visit Information:  Insurance: Payor: [employer] / Plan: [employer] [place] [id number] / Product Type: *No Product type* /   PT Visit Information  Onset Date: 22  Total # of Visits Approved: 20  Total # of Visits to Date: 2  No Show: 0  Canceled Appointment: 0  Progress Note Counter: -10    Subjective Information:  Subjective: patient reports some pain at distal knee cap on post side of patella. Patient was d/c from crutches on the . was told to take it \"easy\" in PT for 2 weeks.   HEP Compliance:  [x] Good [] Fair [] Poor [] Reports not doing due to:    Pain Screening  Patient Currently in Pain: Yes  Pain Assessment: 0-10  Pain Level: 4 (when leg extended)    Treatment:  Exercises:  Exercises  Exercise 2: 4-way hip on mat table x20 eamon  Exercise 5: Upright bike L6 x5 min for ROM and mobility  Exercise 6: DKTC w/ TB 20x3-5 sec holds  Exercise 7: Ham sets 10x10\" holds  Exercise 8: SAQ x20 eamon  Exercise 9: bridges 2x10  Exercise 10: ham str at step 3x30\" holds eamon  Exercise 20: HEP: cont current + ham string str, hip ext, bridges     Modalities:  Vasopneumatic Device (CPT [id number])  Patient Position: Supine  Vasopneumatic Specified Location: R knee  Pre-Girth Measurement: 39.5  Post-Girth Measurement: 39.2  Post treatment skin assessment: Intact  Limitations addressed: Edema, Pain modulation  Untimed (minutes): 10 (+2 for set up and measurements)       *Indicates exercise, modality, or manual techniques to be initiated when appropriate    Objective Measures:      Strength: [x] NT  [] MMT completed:     ROM: [x] NT  [] ROM measurements:       Assessment: Body Structures, Functions, Activity Limitations Requiring Skilled Therapeutic Intervention: Decreased functional mobility , Decreased ROM, Decreased strength  Assessment: Patient presented to therapy with minimal pain in R patella only with muscle activation. Patient tolerated treatment well today with initiation of more strengthening and stretching on mat table. Patient did endorse some hamstring discomfort and cramping, likely d/t decrease muscle extensibility and strength from graft site. Cont per poc for strengthening, ROM and more dynamic movements when tolerated. Treatment Diagnosis: Decreased R knee ROM, Decreased R LE strength  Therapy Prognosis: Good, Excellent  PT Education: Body mechanics, Home Exercise Program  Activity Tolerance  Activity Tolerance: Patient tolerated treatment well    Post-Pain Assessment:       Pain Rating (0-10 pain scale):   0/10   Location and pain description same as pre-treatment unless indicated. Action: [] NA   [x] Perform HEP  [] Meds as prescribed  [] Modalities as prescribed   [] Call Physician     GOALS   Patient Goal(s): Patient goals : \"Get back to normal\"    Short Term Goals Completed by 2 weeks Goal Status   STG 1 Patient will be indep with HEP In progress       Long Term Goals Completed by 8-10 weeks Goal Status   LTG 1 Patient will improve R knee rom to 0-140 deg to increase stair negotiation In progress   LTG 2 Patient will improve R hip and knee strength to 5/5 to improve endurance and gait mechanics in the community In progress   LTG 3 Patient will demonstrate reciprocal stair negotiation with no device to improve functional mobility and household tasks.  In progress   LTG 4 Patient will improve LEFS by >/=10 points to correlate to [address] to demonstrate increased QOL In progress Plan:  Frequency/Duration:  Plan  Plan Frequency: 1x  Plan weeks: 8-10  Current Treatment Recommendations: Strengthening, ROM, Balance training, Functional mobility training, Endurance training, Stair training, Gait training, Neuromuscular re-education, Manual Therapy - Soft Tissue Mobilization, Manual Therapy - Joint Manipulation, Pain management, Home exercise program, Safety education & training, Modalities, Therapeutic activities  Pt to continue current HEP. See objective section for any therapeutic exercise changes, additions or modifications this date.     Therapy Time:      PT Individual Minutes  Time In: 9321  Time Out: 2732  Minutes: 49  Timed Code Treatment Minutes: 37 Minutes  Procedure Minutes: 10+2 min Game Ready   Timed Activity Minutes Units   Ther Ex 37 2     Electronically signed by Robert Christian PT on 8/22/22 at 4:41 PM EDT

## 2022-08-29 ENCOUNTER — HOSPITAL ENCOUNTER (OUTPATIENT)
Dept: PHYSICAL THERAPY | Age: 32
Setting detail: THERAPIES SERIES
Discharge: HOME | End: 2022-08-29
Payer: COMMERCIAL

## 2022-08-29 PROCEDURE — 97110 THERAPEUTIC EXERCISES: CPT

## 2022-08-29 ASSESSMENT — PAIN SCALES - GENERAL: PAINLEVEL_OUTOF10: 3

## 2022-08-29 NOTE — PROGRESS NOTES
[facility]  Outpatient Physical Therapy    Treatment Note        Date: 2022  Patient: Danii Tobias  : 1990   Confirmed: Yes  MRN: [id number]  Referring Provider: India Schlatter, MD  Secondary Referring Provider (If applicable):     Medical Diagnosis: Other tear of medial meniscus, current injury, unspecified knee, initial encounter [S83.249A]  Encounter for procedure for purposes other than remedying health state, unspecified [Z41.9]    Treatment Diagnosis: Decreased R knee ROM, Decreased R LE strength    Visit Information:  Insurance: Payor: [employer] / Plan: [employer] J[place] LU[place] [id number] / Product Type: *No Product type* /   PT Visit Information  Onset Date: 22  Total # of Visits Approved: 20  Total # of Visits to Date: 3  No Show: 0  Canceled Appointment: 0  Progress Note Counter: 3/8-10    Subjective Information:  Subjective: Patient reports hes feeling pretty good, minimal pain in Medial knee. Stated walked around fair for about 2hrs and did not report any pain. Has been doing long sitting calf stretch, states it hurts to do, but feels like its working  HEP Compliance:  [x] Good [] Fair [] Poor [] Reports not doing due to:    Pain Screening  Patient Currently in Pain: Yes  Pain Assessment: 0-10  Pain Level: 3    Treatment:  Exercises:  Exercises  Exercise 1: Quad Sets 10x10\" holds (towel under ankle)  Exercise 2: 4-way hip on mat table x10 eamon  Exercise 7: Ham sets 10x10\" holds ; hamstring walk-outs x10  Exercise 9: bridges 2x10  Exercise 10: ham str at step 3x30\" holds eamon  Exercise 11: 6in heel taps down 3x10 eamon W/ mirror for visual feedback  Exercise 20: HEP: cont current     *Indicates exercise, modality, or manual techniques to be initiated when appropriate    Objective Measures:        Strength: [x] NT  [] MMT completed:       ROM: [] NT  [x] ROM measurements:      R knee extension: -5         Assessment:    Body Structures, Functions, Activity Limitations Requiring Skilled Therapeutic Intervention: Decreased functional mobility , Decreased ROM, Decreased strength  Assessment: Patient was able to progress activities this session to include heel taps and hip strengtheing with resistance. Patient presented with R quad weakness and decreased stability as demonstrated by compensatory movements during heel taps, utizilized mirror for visual feed back and self correction. Patient also presented with glute med weakness as demonstrated by trendelenburg in SLS. cont to progress strengthening, mobility and ROM. Treatment Diagnosis: Decreased R knee ROM, Decreased R LE strength  Therapy Prognosis: Good, Excellent  PT Education: Body mechanics, Home Exercise Program  Activity Tolerance  Activity Tolerance: Patient tolerated treatment well    Post-Pain Assessment:       Pain Rating (0-10 pain scale):   better/10   Location and pain description same as pre-treatment unless indicated. Action: [] NA   [x] Perform HEP  [] Meds as prescribed  [] Modalities as prescribed   [] Call Physician     GOALS   Patient Goal(s): Patient goals : \"Get back to normal\"    Short Term Goals Completed by 2 weeks Goal Status   STG 1 Patient will be indep with HEP In progress       Long Term Goals Completed by 8-10 weeks Goal Status   LTG 1 Patient will improve R knee rom to 0-140 deg to increase stair negotiation In progress   LTG 2 Patient will improve R hip and knee strength to 5/5 to improve endurance and gait mechanics in the community In progress   LTG 3 Patient will demonstrate reciprocal stair negotiation with no device to improve functional mobility and household tasks.  In progress   LTG 4 Patient will improve LEFS by >/=10 points to correlate to [address] to demonstrate increased QOL In progress            Plan:  Frequency/Duration:  Plan  Plan Frequency: 1x  Plan weeks: 8-10  Current Treatment Recommendations: Strengthening, ROM, Balance training, Functional mobility training, Endurance training, Stair training, Gait training, Neuromuscular re-education, Manual Therapy - Soft Tissue Mobilization, Manual Therapy - Joint Manipulation, Pain management, Home exercise program, Safety education & training, Modalities, Therapeutic activities  Pt to continue current HEP. See objective section for any therapeutic exercise changes, additions or modifications this date.     Therapy Time:      PT Individual Minutes  Time In: 7682  Time Out: 0210  Minutes: 38  Timed Code Treatment Minutes: 38 Minutes  Procedure Minutes:  Timed Activity Minutes Units   Ther Ex 38 3     Electronically signed by Gio Taylor PT on 8/29/22 at 4:42 PM EDT

## 2022-09-06 ENCOUNTER — HOSPITAL ENCOUNTER (OUTPATIENT)
Dept: PHYSICAL THERAPY | Age: 32
Setting detail: THERAPIES SERIES
Discharge: HOME | End: 2022-09-06
Payer: COMMERCIAL

## 2022-09-06 PROCEDURE — 97110 THERAPEUTIC EXERCISES: CPT

## 2022-09-06 ASSESSMENT — PAIN DESCRIPTION - LOCATION: LOCATION: KNEE

## 2022-09-06 ASSESSMENT — PAIN SCALES - GENERAL: PAINLEVEL_OUTOF10: 2

## 2022-09-06 ASSESSMENT — PAIN DESCRIPTION - ORIENTATION: ORIENTATION: RIGHT

## 2022-09-06 NOTE — PROGRESS NOTES
[facility]  Outpatient Physical Therapy    Treatment Note        Date: 2022  Patient: Vu Eason  : 1990   Confirmed: Yes  MRN: [id number]  Referring Provider: Gurpreet Acosta MD  Secondary Referring Provider (If applicable):     Medical Diagnosis: Other tear of medial meniscus, current injury, unspecified knee, initial encounter [S83.249A]  Encounter for procedure for purposes other than remedying health state, unspecified [Z41.9]    Treatment Diagnosis: Decreased R knee ROM, Decreased R LE strength    Visit Information:  Insurance: Payor: [employer] / Plan: [employer] [place] [id number] / Product Type: *No Product type* /   PT Visit Information  Onset Date: 22  Total # of Visits Approved: 20  Total # of Visits to Date: 4  No Show: 0  Canceled Appointment: 0  Progress Note Counter: -10    Subjective Information:  Subjective: Pt reports 2/10 medial incision pain with straightening.   HEP Compliance:  [x] Good [] Fair [] Poor [] Reports not doing due to:    Pain Screening  Patient Currently in Pain: Yes  Pain Level: 2  Pain Location: Knee  Pain Orientation: Right    Treatment:  Exercises:  Exercises  Exercise 1: Quad Sets 15x10\" holds (towel under ankle)  Exercise 2: 4-way hip on mat table x20 eamon, Hip circles x 15  Exercise 3: strap assisted heel slides 15 x3-5 sec holds  Exercise 5: Upright bike L6 x5 min for ROM and mobility L5 NV  Exercise 6: DKTC w/ TB 20x3-5 sec holds  Exercise 7: Monster walks YTB fwd & lat x 5  Exercise 10: ham str at step 3x30\" holds eamon  Exercise 11: 6in heel taps down 3x10 eamon W/ mirror for visual feedback  Exercise 12: Stool scoot x 2 laps 20'  Exercise 20: HEP: cont current       Modalities:  Cryotherapy (CPT 38287)  Patient Position: Supine (game ready - no compression)  Number Minutes Cryotherapy: 10  Cryotherapy location: Right, Knee  Post treatment skin assessment: Redness - no adverse reaction       *Indicates exercise, modality, or manual techniques to be initiated when appropriate    Objective Measures:      Strength: [x] NT  [] MMT completed:     ROM: [] NT  [x] ROM measurements:         AROM RLE (degrees)  R Knee Flexion 0-145: 133 deg supine  R Knee Extension 0: -3          Assessment: Body Structures, Functions, Activity Limitations Requiring Skilled Therapeutic Intervention: Decreased functional mobility , Decreased ROM, Decreased strength  Assessment: Pt continues to progress activities added monster walks forward and lateral, Hip circles and stool scoots to improve HS strength. AROM improved today both extension and Flexion. Concluded with CP to decrease inflammation. Treatment Diagnosis: Decreased R knee ROM, Decreased R LE strength             Post-Pain Assessment:       Pain Rating (0-10 pain scale):   0/10   Location and pain description same as pre-treatment unless indicated. Action: [] NA   [] Perform HEP  [] Meds as prescribed  [] Modalities as prescribed   [] Call Physician     GOALS   Patient Goal(s): Patient goals : \"Get back to normal\"    Short Term Goals Completed by 2 weeks Goal Status   STG 1 Patient will be indep with HEP In progress     Long Term Goals Completed by 8-10 weeks Goal Status   LTG 1 Patient will improve R knee rom to 0-140 deg to increase stair negotiation In progress   LTG 2 Patient will improve R hip and knee strength to 5/5 to improve endurance and gait mechanics in the community In progress   LTG 3 Patient will demonstrate reciprocal stair negotiation with no device to improve functional mobility and household tasks.  In progress   LTG 4 Patient will improve LEFS by >/=10 points to correlate to [address] to demonstrate increased QOL In progress          Plan:  Frequency/Duration:  Plan  Plan Frequency: 1x  Plan weeks: 8-10  Current Treatment Recommendations: Strengthening, ROM, Balance training, Functional mobility training, Endurance training, Stair training, Gait training, Neuromuscular re-education, Manual Therapy - Soft Tissue Mobilization, Manual Therapy - Joint Manipulation, Pain management, Home exercise program, Safety education & training, Modalities, Therapeutic activities  Pt to continue current HEP. See objective section for any therapeutic exercise changes, additions or modifications this date.     Therapy Time:      PT Individual Minutes  Time In: 1811  Time Out: 4647  Minutes: 49  Timed Code Treatment Minutes: 38 Minutes  Procedure Minutes:CP 10    Timed Activity Minutes Units   Ther Ex 38 3     Electronically signed by Priscila Chaudhary PTA on 9/6/22 at 4:27 PM EDT

## 2022-09-12 ENCOUNTER — HOSPITAL ENCOUNTER (OUTPATIENT)
Dept: PHYSICAL THERAPY | Age: 32
Setting detail: THERAPIES SERIES
Discharge: HOME | End: 2022-09-12
Payer: COMMERCIAL

## 2022-09-12 PROCEDURE — 97110 THERAPEUTIC EXERCISES: CPT

## 2022-09-12 ASSESSMENT — PAIN SCALES - GENERAL: PAINLEVEL_OUTOF10: 5

## 2022-09-12 ASSESSMENT — PAIN DESCRIPTION - DESCRIPTORS: DESCRIPTORS: ACHING;SHARP

## 2022-09-12 ASSESSMENT — PAIN DESCRIPTION - LOCATION: LOCATION: KNEE

## 2022-09-12 ASSESSMENT — PAIN DESCRIPTION - ORIENTATION: ORIENTATION: RIGHT

## 2022-09-12 NOTE — PROGRESS NOTES
[facility]  Outpatient Physical Therapy    Treatment Note        Date: 2022  Patient: Percy Simmonds  : 1990   Confirmed: Yes  MRN: [id number]  Referring Provider: Ronald Smith MD    Medical Diagnosis: Other tear of medial meniscus, current injury, unspecified knee, initial encounter [S83.249A]  Encounter for procedure for purposes other than remedying health state, unspecified [Z41.9]       Treatment Diagnosis: Decreased R knee ROM, Decreased R LE strength    Visit Information:  Insurance: Payor: [employer] / Plan: [employer] J[place] LUIS [id number] / Product Type: *No Product type* /   PT Visit Information  Onset Date: 22  PT Insurance Information: [id number]  Total # of Visits Approved: 20  Total # of Visits to Date: 5  No Show: 0  Canceled Appointment: 0  Progress Note Counter: -10    Subjective Information:  Subjective: Patient reports he is doing ok, cont to endorse pain in knee cap just proximal to the incision and over the patellar tendon.   HEP Compliance:  [x] Good [] Fair [] Poor [] Reports not doing due to:    Pain Screening  Patient Currently in Pain: Yes  Pain Assessment: 0-10  Pain Level: 5  Pain Location: Knee  Pain Orientation: Right  Pain Descriptors: Aching, Sharp    Treatment:  Exercises:  Exercises  Exercise 4: TG L8 squat x10 ; eccentric squat x10; calf raises x10  Exercise 5: Upright bike L5 x5 min for ROM and mobility  Exercise 8: 6\" step-ups with march x20 eamon  Exercise 10: ham str and calf str w/ strap supine [id number] holds each  Exercise 11: 6in heel taps down fwd 3x10 ; x10 lat eamon  Exercise 13: lateral steps over 6\" box 2x10     Modalities:  Cryotherapy (CPT 28156)  Patient Position: Seated  Number Minutes Cryotherapy: 10  Cryotherapy location: Right, Knee  Post treatment skin assessment: Redness - no adverse reaction       *Indicates exercise, modality, or manual techniques to be initiated when appropriate    Objective Measures:      Strength: [x] NT  [] MMT completed:       ROM: [] NT  [x] ROM measurements:         AROM RLE (degrees)  R Knee Flexion 0-145: 128  R Knee Extension 0: -3      Assessment: Body Structures, Functions, Activity Limitations Requiring Skilled Therapeutic Intervention: Decreased functional mobility , Decreased ROM, Decreased strength  Assessment: Cont to progress LE strengthening and stability. Introduced more plyometric activities in sagittal and frontal plane with no increase in familiar pain symptoms. Patient was fatigued, specifically in quadriceps muscle. Concluded with stretching and CP to R Knee for inc tissue extensibility and decrease muscle fatigue. Cont per poc for further progression. Treatment Diagnosis: Decreased R knee ROM, Decreased R LE strength     Post-Pain Assessment:       Pain Rating (0-10 pain scale):   0/10   Location and pain description same as pre-treatment unless indicated. Action: [] NA   [x] Perform HEP  [] Meds as prescribed  [] Modalities as prescribed   [] Call Physician     GOALS   Patient Goal(s): Patient goals : \"Get back to normal\"    Short Term Goals Completed by 2 weeks Goal Status   STG 1 Patient will be indep with HEP In progress       Long Term Goals Completed by 8-10 weeks Goal Status   LTG 1 Patient will improve R knee rom to 0-140 deg to increase stair negotiation In progress   LTG 2 Patient will improve R hip and knee strength to 5/5 to improve endurance and gait mechanics in the community In progress   LTG 3 Patient will demonstrate reciprocal stair negotiation with no device to improve functional mobility and household tasks.  In progress   LTG 4 Patient will improve LEFS by >/=10 points to correlate to [facility] to demonstrate increased QOL In progress            Plan:  Frequency/Duration:  Plan  Plan Frequency: 1x  Plan weeks: 8-10  Current Treatment Recommendations: Strengthening, ROM, Balance training, Functional mobility training, Endurance training, Stair training, Gait training, Neuromuscular re-education, Manual Therapy - Soft Tissue Mobilization, Manual Therapy - Joint Manipulation, Pain management, Home exercise program, Safety education & training, Modalities, Therapeutic activities  Pt to continue current HEP. See objective section for any therapeutic exercise changes, additions or modifications this date.     Therapy Time:      PT Individual Minutes  Time In: 4740  Time Out: 3656  Minutes: 50  Timed Code Treatment Minutes: 40 Minutes  Procedure Minutes: x10min CP to R knee  Timed Activity Minutes Units   Ther Ex 40 3     Electronically signed by Eloy England PT on 9/12/22 at 4:43 PM EDT

## 2022-09-16 ENCOUNTER — OFFICE VISIT (OUTPATIENT)
Dept: FAMILY MEDICINE CLINIC | Age: 32
End: 2022-09-16
Payer: COMMERCIAL

## 2022-09-16 VITALS
HEIGHT: 74 IN | HEART RATE: 89 BPM | SYSTOLIC BLOOD PRESSURE: 136 MMHG | BODY MASS INDEX: 33.9 KG/M2 | DIASTOLIC BLOOD PRESSURE: 86 MMHG | OXYGEN SATURATION: 96 % | RESPIRATION RATE: 14 BRPM

## 2022-09-16 DIAGNOSIS — Z20.2 STD EXPOSURE: ICD-10-CM

## 2022-09-16 DIAGNOSIS — F41.9 ANXIETY: Primary | ICD-10-CM

## 2022-09-16 PROCEDURE — 99213 OFFICE O/P EST LOW 20 MIN: CPT | Performed by: FAMILY MEDICINE

## 2022-09-16 SDOH — ECONOMIC STABILITY: FOOD INSECURITY: WITHIN THE PAST 12 MONTHS, THE FOOD YOU BOUGHT JUST DIDN'T LAST AND YOU DIDN'T HAVE MONEY TO GET MORE.: NEVER TRUE

## 2022-09-16 SDOH — ECONOMIC STABILITY: FOOD INSECURITY: WITHIN THE PAST 12 MONTHS, YOU WORRIED THAT YOUR FOOD WOULD RUN OUT BEFORE YOU GOT MONEY TO BUY MORE.: NEVER TRUE

## 2022-09-16 ASSESSMENT — SOCIAL DETERMINANTS OF HEALTH (SDOH): HOW HARD IS IT FOR YOU TO PAY FOR THE VERY BASICS LIKE FOOD, HOUSING, MEDICAL CARE, AND HEATING?: NOT HARD AT ALL

## 2022-09-16 NOTE — PROGRESS NOTES
Attempt call to pt - rings without answer.  Will try back.   Patient is seen in follow up for   Chief Complaint   Patient presents with    [address] [place] [address] for follow up on anxiety doing well.starting a new relation ship wants std check    Past Medical History:   Diagnosis Date    Heart palpitations     PONV (postoperative nausea and vomiting)      Patient Active Problem List    Diagnosis Date Noted    Sleep apnea      Past Surgical History:   Procedure Laterality Date    ANTERIOR CRUCIATE LIGAMENT REPAIR Right 10/2/2020    RIGHT: ARTHROSCOPY KNEE ANTERIOR CRUCIATE LIGAMENT RECONSTRUCTION AND  MEDIAL + LATERAL MENISCENTOMY performed by Phi Gomes MD at [place] [address] ARTHROSCOPY Right 2022    RIGHT KNEE ARTHROSCOPY LATERAL AND MEDIAL MENISCECTOMY, ARTHROSCOPY KNEE MEDIAL AND LATERAL MENISCECTOMY performed by Phi Gomes MD at [facility] 44 EXTRACTION       Family History   Problem Relation Age of Onset    High Blood Pressure Father     Cancer Other         GM     Social History     Socioeconomic History    Marital status: Single   Tobacco Use    Smoking status: Former     Packs/day: 0.25     Types: Cigarettes     Quit date: 2015     Years since quittin.9    Smokeless tobacco: Never   Vaping Use    Vaping Use: Some days   Substance and Sexual Activity    Alcohol use: No    Drug use: No     Social Determinants of Health     Financial Resource Strain: Low Risk     Difficulty of Paying Living Expenses: Not hard at all   Food Insecurity: No Food Insecurity    Worried About [facility] in the Last Year: Never true    Ran Out of Food in the Last Year: Never true     Current Outpatient Medications   Medication Sig Dispense Refill    Multiple Vitamin (MULTI-VITAMIN DAILY) TABS Take 1 tablet by mouth daily      Cholecalciferol 100 MCG (4000 UT) CAPS Take by mouth      fexofenadine (ALLEGRA) 180 MG tablet Take 180 mg by mouth in the morning. venlafaxine (EFFEXOR XR) 150 MG extended release capsule Take 1 capsule by mouth daily 90 capsule 3    ondansetron (ZOFRAN) 4 MG tablet Take 1 tablet by mouth daily as needed for Nausea or Vomiting 30 tablet 0    MULTIPLE VITAMIN PO Take by mouth      Probiotic Product (PROBIOTIC DAILY PO) Take 300 mg by mouth       No current facility-administered medications for this visit. Current Outpatient Medications on File Prior to Visit   Medication Sig Dispense Refill    Multiple Vitamin (MULTI-VITAMIN DAILY) TABS Take 1 tablet by mouth daily      Cholecalciferol 100 MCG (4000 UT) CAPS Take by mouth      fexofenadine (ALLEGRA) 180 MG tablet Take 180 mg by mouth in the morning. venlafaxine (EFFEXOR XR) 150 MG extended release capsule Take 1 capsule by mouth daily 90 capsule 3    ondansetron (ZOFRAN) 4 MG tablet Take 1 tablet by mouth daily as needed for Nausea or Vomiting 30 tablet 0    MULTIPLE VITAMIN PO Take by mouth      Probiotic Product (PROBIOTIC DAILY PO) Take 300 mg by mouth       No current facility-administered medications on file prior to visit.      Allergies   Allergen Reactions    Latex      Added based on information entered during log entry, please review and add reactions, type, and severity as needed    Pcn [Penicillins] Anaphylaxis    Tessalon [Benzonatate] Anaphylaxis    Amoxicillin Itching     pt finished 10 day course of Amoxicillin and proceeded to have itching to bilateral hands, arms, neck, and top of head without any visible rash     Health Maintenance   Topic Date Due    Hepatitis B vaccine (3 of 3 - 3-dose primary series) 12/05/2002    Hepatitis C screen  Never done    COVID-19 Vaccine (3 - Booster for Pfizer series) 09/29/2021    Flu vaccine (1) 09/01/2022    Depression Screen  03/16/2023    DTaP/Tdap/Td vaccine (2 - Td or Tdap) 05/30/2025    HIV screen  Completed    Hepatitis A vaccine  Aged Out    Hib vaccine  Aged Out    Meningococcal (ACWY) vaccine  Aged Out    Pneumococcal 0-64 years Vaccine  Aged Out    Varicella vaccine  Discontinued       Review of Systems     Review of Systems   Constitutional:  Negative for activity change, appetite change, chills, fever and unexpected weight change. HENT: Negative. Eyes: Negative. Respiratory: Negative. Negative for shortness of breath. Cardiovascular: Negative. Negative for chest pain and palpitations. Gastrointestinal: Negative. Endocrine: Negative. Genitourinary: Negative. Musculoskeletal: Negative. Skin: Negative. Allergic/Immunologic: Negative. Neurological: Negative. Hematological: Negative. Psychiatric/Behavioral: Negative. Physical Exam  Vitals:    09/16/22 0822   BP: 136/86   Pulse: 89   Resp: 14   SpO2: 96%   Height: 6' 2\" (1.88 m)       Physical Exam  Constitutional:       Appearance: He is well-developed. HENT:      Right Ear: External ear normal.      Left Ear: External ear normal.   Eyes:      Conjunctiva/sclera: Conjunctivae normal.      Pupils: Pupils are equal, round, and reactive to light. Neck:      Thyroid: No thyromegaly. Cardiovascular:      Rate and Rhythm: Normal rate and regular rhythm. Heart sounds: Normal heart sounds. No murmur heard. No friction rub. No gallop. Pulmonary:      Effort: Pulmonary effort is normal. No respiratory distress. Breath sounds: Normal breath sounds. No wheezing. Abdominal:      General: Bowel sounds are normal. There is no distension. Palpations: Abdomen is soft. There is no mass. Tenderness: There is no abdominal tenderness. There is no guarding or rebound. Hernia: No hernia is present. Genitourinary:     Penis: Normal.    Musculoskeletal:         General: No tenderness. Normal range of motion. Cervical back: Normal range of motion and neck supple. Lymphadenopathy:      Cervical: No cervical adenopathy. Skin:     General: Skin is warm and dry.    Neurological:      Mental Status: He is alert and oriented to person, place, and time. Cranial Nerves: No cranial nerve deficit. Coordination: Coordination normal.       Assessment   Diagnosis Orders   1. Anxiety        2. STD exposure  HIV Screen    Rpr    C.trachomatis N.gonorrhoeae DNA, Urine        Problem List    None    Plan  Orders Placed This Encounter   Procedures    C.trachomatis N.gonorrhoeae DNA, Urine     Standing Status:   Future     Standing Expiration Date:   9/16/2023    HIV Screen     Standing Status:   Future     Standing Expiration Date:   9/16/2023    Rpr     Standing Status:   Future     Standing Expiration Date:   9/16/2023       No orders of the defined types were placed in this encounter. No follow-ups on file.   Isabelle Stark MD

## 2022-09-17 LAB
HIV AG/AB: NONREACTIVE
RPR: NORMAL

## 2022-09-19 ENCOUNTER — HOSPITAL ENCOUNTER (OUTPATIENT)
Dept: PHYSICAL THERAPY | Age: 32
Setting detail: THERAPIES SERIES
Discharge: HOME | End: 2022-09-19
Payer: COMMERCIAL

## 2022-09-19 PROCEDURE — 97110 THERAPEUTIC EXERCISES: CPT

## 2022-09-19 ASSESSMENT — PAIN DESCRIPTION - PAIN TYPE: TYPE: SURGICAL PAIN

## 2022-09-19 ASSESSMENT — PAIN DESCRIPTION - LOCATION: LOCATION: KNEE

## 2022-09-19 ASSESSMENT — PAIN SCALES - GENERAL: PAINLEVEL_OUTOF10: 1

## 2022-09-19 ASSESSMENT — PAIN DESCRIPTION - ORIENTATION: ORIENTATION: RIGHT

## 2022-09-19 ASSESSMENT — PAIN DESCRIPTION - DESCRIPTORS: DESCRIPTORS: ACHING;SHARP

## 2022-09-19 NOTE — PROGRESS NOTES
[facility]  Outpatient Physical Therapy    Treatment Note        Date: 2022  Patient: Crow Angel  : 1990   Confirmed: Yes  MRN: [id number]  Referring Provider: Estefany Rodriguez MD    Medical Diagnosis: Other tear of medial meniscus, current injury, unspecified knee, initial encounter [S83.249A]  Encounter for procedure for purposes other than remedying health state, unspecified [Z41.9]       Treatment Diagnosis: Decreased R knee ROM, Decreased R LE strength    Visit Information:  Insurance: Payor: [employer] / Plan: [employer] JORGE LUIS [id number] / Product Type: *No Product type* /   PT Visit Information  Onset Date: 22  PT Insurance Information: [employer]  Total # of Visits Approved: 20  Total # of Visits to Date: 6  No Show: 0  Canceled Appointment: 0  Progress Note Counter: -10    Subjective Information:  Subjective: Patient reports his knee is feeling really good today, did not do alot of his HEP this weekend d/t being busy but was up and walking around.   HEP Compliance:  [x] Good [] Fair [] Poor [] Reports not doing due to:    Pain Screening  Patient Currently in Pain: Yes  Pain Level: 1  Pain Type: Surgical pain  Pain Location: Knee  Pain Orientation: Right  Pain Descriptors: Aching, Sharp    Treatment:  Exercises:  Exercises  Exercise 1: TKE CC 3pl 2x10x5\" holds  Exercise 5: Upright bike L3 x5 min for ROM and mobility  Exercise 9: 4-way slider x5 eamon  Exercise 10: ham str and calf str w/ strap supine 8p05urd holds each  Exercise 14: RDLs 10# DB x10 ; SL RDLs 10# DB x10 eamon  Exercise 15: sumo squats 10# 2x10  Exercise 16: SL fwd bend 6\" cone taps 2x10 eamon  Exercise 20: HEP: cont current+ sumo squats & RDLs     Modalities:  Cryotherapy (CPT 36310)  Patient Position: Seated  Number Minutes Cryotherapy: 10  Cryotherapy location: Right, Knee  Post treatment skin assessment: Redness - no adverse reaction       *Indicates exercise, modality, or manual techniques to be Treatment Recommendations: Strengthening, ROM, Balance training, Functional mobility training, Endurance training, Stair training, Gait training, Neuromuscular re-education, Manual Therapy - Soft Tissue Mobilization, Manual Therapy - Joint Manipulation, Pain management, Home exercise program, Safety education & training, Modalities, Therapeutic activities  Pt to continue current HEP. See objective section for any therapeutic exercise changes, additions or modifications this date.     Therapy Time:      PT Individual Minutes  Time In: 3419  Time Out: 2922  Minutes: 54  Timed Code Treatment Minutes: 44 Minutes  Procedure Minutes: x10 min CP  Timed Activity Minutes Units   Ther Ex 44 3     Electronically signed by Hermes Gaming PT on 9/19/22 at 4:40 PM EDT

## 2022-09-22 LAB
C. TRACHOMATIS DNA ,URINE: NEGATIVE
N. GONORRHOEAE DNA, URINE: NEGATIVE

## 2022-09-26 ENCOUNTER — HOSPITAL ENCOUNTER (OUTPATIENT)
Dept: PHYSICAL THERAPY | Age: 32
Setting detail: THERAPIES SERIES
Discharge: HOME | End: 2022-09-26
Payer: COMMERCIAL

## 2022-09-26 PROCEDURE — 97116 GAIT TRAINING THERAPY: CPT

## 2022-09-26 PROCEDURE — 97110 THERAPEUTIC EXERCISES: CPT

## 2022-09-26 ASSESSMENT — PAIN SCALES - GENERAL: PAINLEVEL_OUTOF10: 1

## 2022-09-26 ASSESSMENT — PAIN DESCRIPTION - LOCATION: LOCATION: KNEE

## 2022-09-26 ASSESSMENT — PAIN DESCRIPTION - ORIENTATION: ORIENTATION: RIGHT

## 2022-09-26 ASSESSMENT — PAIN DESCRIPTION - DESCRIPTORS: DESCRIPTORS: ACHING

## 2022-09-26 ASSESSMENT — PAIN DESCRIPTION - PAIN TYPE: TYPE: SURGICAL PAIN

## 2022-09-26 NOTE — PROGRESS NOTES
[facility]  Outpatient Physical Therapy    Treatment Note        Date: 2022  Patient: Argenis Poster  : 1990   Confirmed: Yes  MRN: [id number]  Referring Provider: Gonsalo Roche MD    Medical Diagnosis: Other tear of medial meniscus, current injury, unspecified knee, initial encounter [S83.249A]  Encounter for procedure for purposes other than remedying health state, unspecified [Z41.9]       Treatment Diagnosis: Decreased R knee ROM, Decreased R LE strength    Visit Information:  Insurance: Payor: [employer] / Plan: [employer] JORGE LUIS [id number] / Product Type: *No Product type* /   PT Visit Information  Onset Date: 22  PT Insurance Information: [employer] [address]  Total # of Visits Approved: 20  Total # of Visits to Date:   No Show: 0  Canceled Appointment: 0  Progress Note Counter: -10    Subjective Information:  Subjective: Patient states he is doing ok, was pretty sore and took a few day break after last session.   HEP Compliance:  [x] Good [] Fair [] Poor [] Reports not doing due to:    Pain Screening  Patient Currently in Pain: Yes  Pain Assessment: 0-10  Pain Level: 1  Pain Type: Surgical pain  Pain Location: Knee  Pain Orientation: Right  Pain Descriptors: Aching    Treatment:  Exercises:  Exercises  Exercise 4: TG L8 3x10 squats ; RLE SL squats 3x10  Exercise 5: Upright bike L3 x5 min for ROM and mobility  Exercise 10: hamstring str and calf str at step 5x30\" holds eamon  Exercise 15: sumo squats 15# 2x15  Exercise 17: Gait: jogging on treadmill: alternating walking for 1 min @ 2.0 mph and jogging for 1 min @ 4.0 mph x6 min on Treadmill (+4-5 discussion on jogging and donning brace)  Exercise 20: HEP: cont current     *Indicates exercise, modality, or manual techniques to be initiated when appropriate    Objective Measures:      Ambulation  Surface:  (treadmill - Running)  Device: No Device (R knee brace donned)  Assistance: Independent  Quality of Gait: during running, patient initially was heelstriking on forefoot, but corrected after discussion about it. Patient demonstrated decreased SL WB on R LE during running  Distance: treadmill x6 min    Strength: [x] NT  [] MMT completed:   ROM: [x] NT  [] ROM measurements:     Assessment: Body Structures, Functions, Activity Limitations Requiring Skilled Therapeutic Intervention: Decreased functional mobility , Decreased ROM, Decreased strength  Assessment: Cont with current poc to focus on quadricep and VMO strengthening as well as LE stretching to improve overall WBing and functional mobility. Initiated jogging this session per patient request. R knee brace donned. Patient initally hitting with forefoot heel strike, however was able to adjust after discussion of current fear and anxiety of jogging. Patient demonstrated decreased WB on RLE during. Cont per poc for further progression of jogging, strengthening, balance and overall mobility. Treatment Diagnosis: Decreased R knee ROM, Decreased R LE strength     Post-Pain Assessment:       Pain Rating (0-10 pain scale):  \"sore\" /10   Location and pain description same as pre-treatment unless indicated. Action: [] NA   [x] Perform HEP  [] Meds as prescribed  [] Modalities as prescribed   [] Call Physician     GOALS   Patient Goal(s): Patient goals : \"Get back to normal\"    Short Term Goals Completed by 2 weeks Goal Status   STG 1 Patient will be indep with HEP In progress     Long Term Goals Completed by 8-10 weeks Goal Status   LTG 1 Patient will improve R knee rom to 0-140 deg to increase stair negotiation In progress   LTG 2 Patient will improve R hip and knee strength to 5/5 to improve endurance and gait mechanics in the community In progress   LTG 3 Patient will demonstrate reciprocal stair negotiation with no device to improve functional mobility and household tasks.  In progress   LTG 4 Patient will improve LEFS by >/=10 points to correlate to [employer] to demonstrate increased QOL In progress          Plan:  Frequency/Duration:  Plan  Plan Frequency: 1x  Plan weeks: 8-10  Current Treatment Recommendations: Strengthening, ROM, Balance training, Functional mobility training, Endurance training, Stair training, Gait training, Neuromuscular re-education, Manual Therapy - Soft Tissue Mobilization, Manual Therapy - Joint Manipulation, Pain management, Home exercise program, Safety education & training, Modalities, Therapeutic activities  Pt to continue current HEP. See objective section for any therapeutic exercise changes, additions or modifications this date.     Therapy Time:      PT Individual Minutes  Time In: 5079  Time Out: 1625  Minutes: 42  Timed Code Treatment Minutes: 42 Minutes  Procedure Minutes:  Timed Activity Minutes Units   Ther Ex 32 2   gait 10 1     Electronically signed by Shala Vazquez, PT on 9/26/22 at 5:31 PM EDT

## 2022-10-03 ENCOUNTER — HOSPITAL ENCOUNTER (OUTPATIENT)
Dept: PHYSICAL THERAPY | Age: 32
Setting detail: THERAPIES SERIES
Discharge: HOME | End: 2022-10-03
Payer: COMMERCIAL

## 2022-10-03 PROCEDURE — 97110 THERAPEUTIC EXERCISES: CPT

## 2022-10-03 ASSESSMENT — PAIN DESCRIPTION - ORIENTATION: ORIENTATION: RIGHT

## 2022-10-03 ASSESSMENT — PAIN DESCRIPTION - LOCATION: LOCATION: KNEE

## 2022-10-03 ASSESSMENT — PAIN DESCRIPTION - PAIN TYPE: TYPE: SURGICAL PAIN

## 2022-10-03 ASSESSMENT — PAIN SCALES - GENERAL: PAINLEVEL_OUTOF10: 6

## 2022-10-03 ASSESSMENT — PAIN DESCRIPTION - DESCRIPTORS: DESCRIPTORS: ACHING

## 2022-10-03 NOTE — PROGRESS NOTES
[facility]  Outpatient Physical Therapy    Treatment Note        Date: 10/3/2022  Patient: Radha Conklin  : 1990   Confirmed: Yes  MRN: [id number]  Referring Provider: Harmony Brown MD    Medical Diagnosis: Other tear of medial meniscus, current injury, unspecified knee, initial encounter [S83.249A]  Encounter for procedure for purposes other than remedying health state, unspecified [Z41.9]       Treatment Diagnosis: Decreased R knee ROM, Decreased R LE strength    Visit Information:  Insurance: Payor: [employer] / Plan: [employer] JORGE LUIS [id number] / Product Type: *No Product type* /   PT Visit Information  Onset Date: 22  PT Insurance Information: [address]  Total # of Visits Approved: 20  Total # of Visits to Date: 8  No Show: 0  Canceled Appointment: 0  Progress Note Counter: -10    Subjective Information:  Subjective: Patient had increased pain superior aspect of old incision over the weekend which inhibited participation in [employer].   HEP Compliance:  [x] Good [] Fair [] Poor [] Reports not doing due to:    Pain Screening  Patient Currently in Pain: Yes  Pain Level: 6  Pain Type: Surgical pain  Pain Location: Knee  Pain Orientation: Right  Pain Descriptors: Aching    Treatment:  Exercises:  Exercises  Exercise 4: TG L8 3x10 squats ; RLE SL squats 3x10  Exercise 5: Upright bike L3 x5 min for ROM and mobility  Exercise 7: Monster walks YTB lat w/ mini squat x 5  Exercise 8: 6\" step-ups with march x20 eamon  Exercise 10: hamstring str and calf str at step 5x30\" holds eamon  Exercise 11: 6in heel taps down fwd 3x10 ; x10 lat eamon  Exercise 14: RDLs 10# DB x10 ; SL RDLs 10# DB x10 eamon  Exercise 15: sumo squats 15# 2x15  Exercise 17: Gait: jogging on treadmill: alternating walking for 1 min @ 2.0 mph and jogging for 1 min @ 4.0 mph x6 m,in total no incline  Exercise 18: LAQ w/ adductor set x 15 each leg  Exercise 20: HEP: cont current     *Indicates exercise, modality, or manual techniques to be initiated when appropriate    Objective Measures:        Strength: [x] NT  [] MMT completed:       ROM: [x] NT  [] ROM measurements:       Assessment: Body Structures, Functions, Activity Limitations Requiring Skilled Therapeutic Intervention: Decreased functional mobility , Decreased ROM, Decreased strength  Assessment: Continued with current plan of care with the introduction of one exercise pt was able to complete with expected fatigue. Pt required light cuing for exercises this date for form, with good carryover. pt still reports increased pain when leg is fully extended. and reports no pain when leg is slightly bent. Pain in end range of extension reduced throughout tx. Treatment Diagnosis: Decreased R knee ROM, Decreased R LE strength  Therapy Prognosis: Good, Excellent          Post-Pain Assessment:       Pain Rating (0-10 pain scale):  3-4 /10 when straightened out  Location and pain description same as pre-treatment unless indicated. Action: [] NA   [x] Perform HEP  [] Meds as prescribed  [] Modalities as prescribed   [] Call Physician     GOALS   Patient Goal(s): Patient Goals : \"Get back to normal\"    Short Term Goals Completed by 2 weeks Goal Status   STG 1 Patient will be indep with HEP In progress     Long Term Goals Completed by 8-10 weeks Goal Status   LTG 1 Patient will improve R knee rom to 0-140 deg to increase stair negotiation In progress   LTG 2 Patient will improve R hip and knee strength to 5/5 to improve endurance and gait mechanics in the community In progress   LTG 3 Patient will demonstrate reciprocal stair negotiation with no device to improve functional mobility and household tasks.  In progress   LTG 4 Patient will improve LEFS by >/=10 points to correlate to [employer] [employer] to demonstrate increased QOL In progress            Plan:  Frequency/Duration:  Plan  Plan Frequency: 1x  Plan weeks: 8-10  Current Treatment Recommendations: Strengthening, ROM, Balance training, Functional mobility training, Endurance training, Stair training, Gait training, Neuromuscular re-education, Manual Therapy - Soft Tissue Mobilization, Manual Therapy - Joint Manipulation, Pain management, Home exercise program, Safety education & training, Modalities, Therapeutic activities  Pt to continue current HEP. See objective section for any therapeutic exercise changes, additions or modifications this date.     Therapy Time:   PT Individual Minutes  Time In: 6942  Time Out: 0457  Minutes: 45  Timed Code Treatment Minutes: 45 Minutes  Procedure Minutes: 0  Timed Activity Minutes Units   Ther Ex 45 3     Electronically signed by Kaiden Garay PTA on 10/3/22 at 4:23 PM EDT

## 2022-10-10 ENCOUNTER — HOSPITAL ENCOUNTER (OUTPATIENT)
Dept: PHYSICAL THERAPY | Age: 32
Setting detail: THERAPIES SERIES
Discharge: HOME | End: 2022-10-10
Payer: COMMERCIAL

## 2022-10-10 PROCEDURE — 97110 THERAPEUTIC EXERCISES: CPT

## 2022-10-10 ASSESSMENT — PAIN SCALES - GENERAL: PAINLEVEL_OUTOF10: 2

## 2022-10-10 ASSESSMENT — PAIN DESCRIPTION - ORIENTATION: ORIENTATION: RIGHT;INNER

## 2022-10-10 ASSESSMENT — PAIN DESCRIPTION - LOCATION: LOCATION: KNEE

## 2022-10-10 ASSESSMENT — PAIN DESCRIPTION - PAIN TYPE: TYPE: SURGICAL PAIN

## 2022-10-10 ASSESSMENT — PAIN DESCRIPTION - DESCRIPTORS: DESCRIPTORS: ACHING

## 2022-10-10 NOTE — PROGRESS NOTES
Knee  Post treatment skin assessment: Redness - no adverse reaction       *Indicates exercise, modality, or manual techniques to be initiated when appropriate    Objective Measures:       Strength: [x] NT  [] MMT completed:      ROM: [x] NT  [] ROM measurements:      Assessment: Body Structures, Functions, Activity Limitations Requiring Skilled Therapeutic Intervention: Decreased functional mobility , Decreased ROM, Decreased strength  Assessment: Pt demonstrates good progression this session secondary to no increased pain with activities as well as finishing tx with no pain. Improved gait quality with walk/jog on TM, minimally antalgic. Unable to complete increased laps with monster walks d/t time constraints. Recommend pt continue with current HEP. Concluded session with CP to decrease tightness/ soreness in R knee. Treatment Diagnosis: Decreased R knee ROM, Decreased R LE strength  Therapy Prognosis: Good, Excellent          Post-Pain Assessment:       Pain Rating (0-10 pain scale):   0/10   Location and pain description same as pre-treatment unless indicated. Action: [] NA   [x] Perform HEP  [] Meds as prescribed  [] Modalities as prescribed   [] Call Physician     GOALS   Patient Goal(s): Patient Goals : \"Get back to normal\"    Short Term Goals Completed by 2 weeks Goal Status       Long Term Goals Completed by 8-10 weeks Goal Status   LTG 1 Patient will improve R knee rom to 0-140 deg to increase stair negotiation In progress   LTG 2 Patient will improve R hip and knee strength to 5/5 to improve endurance and gait mechanics in the community In progress   LTG 3 Patient will demonstrate reciprocal stair negotiation with no device to improve functional mobility and household tasks.  In progress   LTG 4 Patient will improve LEFS by >/=10 points to correlate to [employer] to demonstrate increased QOL In progress            Plan:  Frequency/Duration:  Plan  Plan Frequency: 1x  Plan weeks: 8-10  Current Treatment Recommendations: Strengthening, ROM, Balance training, Functional mobility training, Endurance training, Stair training, Gait training, Neuromuscular re-education, Manual Therapy - Soft Tissue Mobilization, Manual Therapy - Joint Manipulation, Pain management, Home exercise program, Safety education & training, Modalities, Therapeutic activities  Pt to continue current HEP. See objective section for any therapeutic exercise changes, additions or modifications this date.     Therapy Time:      PT Individual Minutes  Time In: 4331  Time Out: 1431  Minutes: 49  Timed Code Treatment Minutes: 38 Minutes  Procedure Minutes:10  Timed Activity Minutes Units   Ther Ex 39 3     Electronically signed by Kristian Lo PTA on 10/10/22 at 3:47 PM EDT

## 2022-10-18 ENCOUNTER — HOSPITAL ENCOUNTER (OUTPATIENT)
Dept: PHYSICAL THERAPY | Age: 32
Setting detail: THERAPIES SERIES
Discharge: HOME | End: 2022-10-18
Payer: COMMERCIAL

## 2022-10-18 PROCEDURE — 97110 THERAPEUTIC EXERCISES: CPT

## 2022-10-18 ASSESSMENT — PAIN DESCRIPTION - DESCRIPTORS: DESCRIPTORS: SORE;ACHING

## 2022-10-18 ASSESSMENT — PAIN SCALES - GENERAL: PAINLEVEL_OUTOF10: 2

## 2022-10-18 ASSESSMENT — PAIN DESCRIPTION - PAIN TYPE: TYPE: SURGICAL PAIN

## 2022-10-18 ASSESSMENT — PAIN DESCRIPTION - ORIENTATION: ORIENTATION: RIGHT

## 2022-10-18 ASSESSMENT — PAIN DESCRIPTION - LOCATION: LOCATION: KNEE

## 2022-10-18 NOTE — PROGRESS NOTES
[facility]  Outpatient Physical Therapy    Treatment Note        Date: 10/18/2022  Patient: Jakob Presley  : 1990   Confirmed: Yes  MRN: [id number]  Referring Provider: Oda Shone, MD    Medical Diagnosis: Other tear of medial meniscus, current injury, unspecified knee, initial encounter [S83.249A]  Encounter for procedure for purposes other than remedying health state, unspecified [Z41.9]       Treatment Diagnosis: Decreased R knee ROM, Decreased R LE strength    Visit Information:  Insurance: Payor: [employer]OL[employer] / Plan: [place] [id number] / Product Type: *No Product type* /   PT Visit Information  Onset Date: 22  PT Insurance Information: [address]  Total # of Visits Approved: 20  Total # of Visits to Date: 10  No Show: 0  Canceled Appointment: 0  Progress Note Counter: 10/8-10    Subjective Information:  Subjective: pain is 1-2/10 when my leg is straight  HEP Compliance:  [x] Good [] Fair [] Poor [] Reports not doing due to:    Pain Screening  Patient Currently in Pain: Yes  Pain Level: 2  Pain Type: Surgical pain  Pain Location: Knee  Pain Orientation: Right  Pain Descriptors: Sore, Aching    Treatment:  Exercises:  Exercises  Exercise 1: SLS's 30 sec x 3, b/l  Exercise 2: SLS w/ reach 30 sec x 3, b/l  Exercise 3: SLS w/ rotation x 15, b/l  Exercise 5: Upright bike S-3 x 5 min for ROM and mobility  Exercise 6: vectors 3-way x 15, b/l  Exercise 7: Monster walks w/ YTB  w/ mini squat x 3 laps at //bars  Exercise 9: SLS high knee march 3 sec hold x 3 laps  Exercise 12: step downs x 20, w/ 6 in step       Modalities:  Cryotherapy (CPT 19991)  Patient Position: Seated  Number Minutes Cryotherapy: 10  Cryotherapy location: Right, Knee  Post treatment skin assessment: Redness - no adverse reaction       *Indicates exercise, modality, or manual techniques to be initiated when appropriate    Objective Measures:           Strength: [] NT  [x] MMT completed:  Strength RLE  R Hip Flexion: 5/5  R Hip Extension: 5/5  R Hip ABduction: 5/5  R Knee Flexion: 5/5  R Knee Extension: 5/5  Strength LLE  L Hip Flexion: 5/5  L Hip Extension: 5/5  L Hip ABduction: 5/5  L Knee Flexion: 5/5  L Knee Extension: 5/5        ROM: [] NT  [x] ROM measurements:     AROM LLE (degrees)  LLE AROM : WNL   AROM RLE (degrees)  RLE AROM: WNL          Assessment: Body Structures, Functions, Activity Limitations Requiring Skilled Therapeutic Intervention: Decreased functional mobility , Decreased ROM, Decreased strength  Assessment: continued to progress current exercises, added, SLS's, SLS w/ reach, SLS w/ rotation, vectors 3-way, b/l, SLS high knee march and step downs w/ 6 in step, patient w/ very good tolerance to all, conclude w/ CP to reduce mm soreness. Treatment Diagnosis: Decreased R knee ROM, Decreased R LE strength  Therapy Prognosis: Good, Excellent      Post-Pain Assessment:       Pain Rating (0-10 pain scale):   0/10   Location and pain description same as pre-treatment unless indicated. Action: [] NA   [x] Perform HEP  [] Meds as prescribed  [] Modalities as prescribed   [] Call Physician     GOALS   Patient Goal(s): Patient Goals : \"Get back to normal\"    Short Term Goals Completed by 2 weeks Goal Status   STG 1 Patient will be indep with HEP Met       Long Term Goals Completed by 8-10 weeks Goal Status   LTG 1 Patient will improve R knee rom to 0-140 deg to increase stair negotiation Met   LTG 2 Patient will improve R hip and knee strength to 5/5 to improve endurance and gait mechanics in the community Met   LTG 3 Patient will demonstrate reciprocal stair negotiation with no device to improve functional mobility and household tasks. Met   LTG 4 Patient will improve LEFS by >/=10 points to correlate to [employer] [address] to demonstrate increased QOL Met            Plan:    Plan  Additional Comments: D/C per request  Pt to continue current HEP.   See objective section for any therapeutic exercise changes, additions or modifications this date.     Therapy Time:      PT Individual Minutes  Time In: 0710  Time Out: 0997  Minutes: 48  Timed Code Treatment Minutes: 38 Minutes  Procedure Minutes:CP 10 min  Timed Activity Minutes Units   Ther Ex 38 3     Electronically signed by Penelope Nieto PTA on 10/18/22 at 7:44 AM EDT

## 2022-10-18 NOTE — PROGRESS NOTES
[address]     Ph: [phone]  Fax: [phone]      [] Certification  [] Recertification []  Plan of Care  [] Progress Note [x] Discharge      Referring Provider: Kristie Montalvo MD     From:  Linda Shay, PT,DPT  Patient: Gene Porras (29 y.o. male) : 1990 Date: 10/18/2022  Medical Diagnosis: Other tear of medial meniscus, current injury, unspecified knee, initial encounter [place] [facility]  Encounter for procedure for purposes other than remedying health state, unspecified [Z41.9]       Treatment Diagnosis: Decreased R knee ROM, Decreased R LE strength    Plan of Care/Certification Expiration Date: :     Progress Report Period from:  8/15/2022  to 10/18/2022    Visits to Date: 10 No Show: 0 Cancelled Appts: 0    OBJECTIVE:   Short Term Goals - Time Frame for Short Term Goals: 2 weeks    Goals Current/Discharge status  Status   Short Term Goal 1: Patient will be indep with HEP  Indep/compliant w/ HEP Met     Long Term Goals - Time Frame for Long Term Goals : 8-10 weeks  Goals Current/ Discharge status Status   Long Term Goal 1: Patient will improve R knee rom to 0-140 deg to increase stair negotiation AROM is WNL Met   Long Term Goal 2: Patient will improve R hip and knee strength to 5/5 to improve endurance and gait mechanics in the community Strength RLE  R Hip Flexion: 5/5  R Hip Extension: 5/5  R Hip ABduction: 5/5  R Knee Flexion: 5/5  R Knee Extension: 5/5  Strength LLE  L Hip Flexion: 5/5  L Hip Extension: 5/5  L Hip ABduction: 5/5  L Knee Flexion: 5/5  L Knee Extension: 5/5           Met   Long Term Goal 3: Patient will demonstrate reciprocal stair negotiation with no device to improve functional mobility and household tasks.  Able to perform stairs reciprocally Met   Long Term Goal 4: Patient will improve LEFS by >/=10 points to correlate to S[employer] to demonstrate increased QOL LEFS=67/80 Met Body Structures, Functions, Activity Limitations Requiring Skilled Therapeutic Intervention: Decreased functional mobility , Decreased ROM, Decreased strength  Assessment: Patient is a 30 yo male who presented to skilled therapy s/p R knee menisectomy on 8/12/2022. Patient completed a total of 10 visits between the dates of 8/15/2022 to 10/18/2022. Obj measures completed to by Xiomy Voss PTA for d/c. Patient was able to meet all goals, with improvements in ambulation, stair negotiation, ROM, strength and QOL as demonstrated by significant improvement in LEFS score. continued to progress current exercises this session with addition of SLS's, SLS w/ reach, SLS w/ rotation, vectors 3-way, b/l, SLS high knee march and step downs w/ 6 in step, patient w/ very good tolerance to all, conclude w/ CP to reduce mm soreness. Patient to be formally d/c from skilled PT at this time and continue indep with HEP at home. Patient demonstrated understanding and compliance. Therapy Prognosis: Good, Excellent    PLAN:     Additional Comments: D/C per request                   Patient Status:[] Continue/ Initiate plan of Care    [x] Discharge PT. Recommend pt continue with HEP. [] Additional visits requested, Please re-certify for additional visits:    [] Hold         Signature: obj info byElectronically signed by Xiomy Voss PTA on 10/18/22 at 7:52 AM EDT  Electronically signed by Luz Maria Bell PT on 10/18/2022 at 9:12 AM      If you have any questions or concerns, please don't hesitate to call.   Thank you for your referral.

## 2023-03-21 ENCOUNTER — OFFICE VISIT (OUTPATIENT)
Dept: FAMILY MEDICINE CLINIC | Age: 33
End: 2023-03-21
Payer: COMMERCIAL

## 2023-03-21 VITALS
WEIGHT: 270 LBS | DIASTOLIC BLOOD PRESSURE: 86 MMHG | HEART RATE: 87 BPM | OXYGEN SATURATION: 98 % | BODY MASS INDEX: 34.65 KG/M2 | RESPIRATION RATE: 15 BRPM | HEIGHT: 74 IN | SYSTOLIC BLOOD PRESSURE: 138 MMHG

## 2023-03-21 DIAGNOSIS — M25.561 CHRONIC PAIN OF RIGHT KNEE: Primary | ICD-10-CM

## 2023-03-21 DIAGNOSIS — G89.29 CHRONIC PAIN OF RIGHT KNEE: Primary | ICD-10-CM

## 2023-03-21 PROCEDURE — 99213 OFFICE O/P EST LOW 20 MIN: CPT | Performed by: FAMILY MEDICINE

## 2023-03-21 RX ORDER — VENLAFAXINE HYDROCHLORIDE 37.5 MG/1
37.5 CAPSULE, EXTENDED RELEASE ORAL DAILY
Qty: 30 CAPSULE | Refills: 3 | Status: SHIPPED | OUTPATIENT
Start: 2023-03-21

## 2023-03-21 RX ORDER — VENLAFAXINE HYDROCHLORIDE 75 MG/1
75 CAPSULE, EXTENDED RELEASE ORAL DAILY
Qty: 30 CAPSULE | Refills: 3 | Status: SHIPPED | OUTPATIENT
Start: 2023-03-21

## 2023-03-21 SDOH — ECONOMIC STABILITY: HOUSING INSECURITY
IN THE LAST 12 MONTHS, WAS THERE A TIME WHEN YOU DID NOT HAVE A STEADY PLACE TO SLEEP OR SLEPT IN A SHELTER (INCLUDING NOW)?: NO

## 2023-03-21 SDOH — ECONOMIC STABILITY: FOOD INSECURITY: WITHIN THE PAST 12 MONTHS, YOU WORRIED THAT YOUR FOOD WOULD RUN OUT BEFORE YOU GOT MONEY TO BUY MORE.: NEVER TRUE

## 2023-03-21 SDOH — ECONOMIC STABILITY: FOOD INSECURITY: WITHIN THE PAST 12 MONTHS, THE FOOD YOU BOUGHT JUST DIDN'T LAST AND YOU DIDN'T HAVE MONEY TO GET MORE.: NEVER TRUE

## 2023-03-21 SDOH — ECONOMIC STABILITY: INCOME INSECURITY: HOW HARD IS IT FOR YOU TO PAY FOR THE VERY BASICS LIKE FOOD, HOUSING, MEDICAL CARE, AND HEATING?: NOT HARD AT ALL

## 2023-03-21 ASSESSMENT — PATIENT HEALTH QUESTIONNAIRE - PHQ9
SUM OF ALL RESPONSES TO PHQ QUESTIONS 1-9: 0
SUM OF ALL RESPONSES TO PHQ QUESTIONS 1-9: 0
SUM OF ALL RESPONSES TO PHQ9 QUESTIONS 1 & 2: 0
2. FEELING DOWN, DEPRESSED OR HOPELESS: 0
SUM OF ALL RESPONSES TO PHQ QUESTIONS 1-9: 0
SUM OF ALL RESPONSES TO PHQ QUESTIONS 1-9: 0
1. LITTLE INTEREST OR PLEASURE IN DOING THINGS: 0

## 2023-03-21 ASSESSMENT — ENCOUNTER SYMPTOMS
RESPIRATORY NEGATIVE: 1
SHORTNESS OF BREATH: 0
EYES NEGATIVE: 1
ALLERGIC/IMMUNOLOGIC NEGATIVE: 1
GASTROINTESTINAL NEGATIVE: 1

## 2023-03-21 NOTE — PROGRESS NOTES
Patient is seen in follow up for   Chief Complaint   Patient presents with    Discuss Medications    Knee Pain     Right knee has had 2 surgeries still having pain requesting referral has seen Shira Sanchez but would like 2nd opinion since still having pain. Knee Pain   here with knee pain after two surgeries.     Past Medical History:   Diagnosis Date    Heart palpitations     PONV (postoperative nausea and vomiting)      Patient Active Problem List    Diagnosis Date Noted    Sleep apnea      Past Surgical History:   Procedure Laterality Date    ANTERIOR CRUCIATE LIGAMENT REPAIR Right 10/2/2020    RIGHT: ARTHROSCOPY KNEE ANTERIOR CRUCIATE LIGAMENT RECONSTRUCTION AND  MEDIAL + LATERAL MENISCENTOMY performed by Flor Rivero MD at [place] [address] ARTHROSCOPY Right 2022    RIGHT KNEE ARTHROSCOPY LATERAL AND MEDIAL MENISCECTOMY, ARTHROSCOPY KNEE MEDIAL AND LATERAL MENISCECTOMY performed by Flor Rivero MD at [facility]      TYMPANOSTOMY TUBE PLACEMENT      WISDOM TOOTH EXTRACTION       Family History   Problem Relation Age of Onset    High Blood Pressure Father     Cancer Other         GM     Social History     Socioeconomic History    Marital status: Single     Spouse name: None    Number of children: None    Years of education: None    Highest education level: None   Tobacco Use    Smoking status: Former     Packs/day: 0.25     Types: Cigarettes     Quit date: 2015     Years since quittin.4    Smokeless tobacco: Never   Vaping Use    Vaping Use: Some days   Substance and Sexual Activity    Alcohol use: No    Drug use: No     Social Determinants of Health     Financial Resource Strain: Low Risk     Difficulty of Paying Living Expenses: Not hard at all   Food Insecurity: No Food Insecurity    Worried About Running Out of Food in the Last Year: Never true    Ran Out of Food in the Last Year: Never true   Transportation Needs: Unknown    Lack of Transportation

## 2023-03-30 DIAGNOSIS — M17.11 PRIMARY OSTEOARTHRITIS OF RIGHT KNEE: Primary | ICD-10-CM

## 2023-03-31 ENCOUNTER — OFFICE VISIT (OUTPATIENT)
Dept: ORTHOPEDIC SURGERY | Age: 33
End: 2023-03-31

## 2023-03-31 VITALS
HEART RATE: 82 BPM | OXYGEN SATURATION: 95 % | HEIGHT: 75 IN | SYSTOLIC BLOOD PRESSURE: 135 MMHG | TEMPERATURE: 98.2 F | BODY MASS INDEX: 32.95 KG/M2 | DIASTOLIC BLOOD PRESSURE: 96 MMHG | WEIGHT: 265 LBS

## 2023-03-31 DIAGNOSIS — Z98.890 HX OF ANTERIOR CRUCIATE LIGAMENT TEAR RECONSTRUCTION: Primary | ICD-10-CM

## 2023-03-31 ASSESSMENT — ENCOUNTER SYMPTOMS
SHORTNESS OF BREATH: 0
CONSTIPATION: 0
EYE ITCHING: 0
COUGH: 0
ABDOMINAL PAIN: 0
EYE PAIN: 0
DIARRHEA: 0
EYE DISCHARGE: 0

## 2023-03-31 NOTE — PROGRESS NOTES
Patient ID:  Carissa Sultana is a 29 y.o. male who presents today for evaluation of right knee pain. ACL, medial meniscus, lateral meniscus, partial MCL tear 2020.  2020 ACL reconstruction and meniscus repair. Re-tore meniscus, repeat meniscectomy 2022. Continued pain only when its straight. Pain medially. Injury: yes; as above  Metal Allergy: no    Location: right  knee pain, located on the medial and anterior aspect of the knee  Pain: yes; 9 on a scale of 1 to 10  Onset: gradual  Duration: 1 years  Frequency:  occurs daily  Quality: aching and boring   Swelling: patient does not note any swelling of the joint  Aggravating factors: straightening knee  Alleviating factors: removing weight from leg and rest  Mechanical symptoms: none  Radiation: no    Activities: walking independently  Restriction:  decreased ambulatory tolerance  Progression:  worsening    Previous treatment:  none  NSAIDs:  none  PT:  Physical therapy, completed    Medications:    Current Outpatient Medications on File Prior to Visit   Medication Sig Dispense Refill    venlafaxine (EFFEXOR XR) 37.5 MG extended release capsule Take 1 capsule by mouth daily 30 capsule 3    venlafaxine (EFFEXOR XR) 75 MG extended release capsule Take 1 capsule by mouth daily 30 capsule 3    Multiple Vitamin (MULTI-VITAMIN DAILY) TABS Take 1 tablet by mouth daily      Cholecalciferol 100 MCG (4000 UT) CAPS Take by mouth      Probiotic Product (PROBIOTIC DAILY PO) Take 300 mg by mouth      fexofenadine (ALLEGRA) 180 MG tablet Take 180 mg by mouth in the morning. (Patient not taking: No sig reported)      venlafaxine (EFFEXOR XR) 150 MG extended release capsule Take 1 capsule by mouth daily (Patient not taking: Reported on 3/31/2023) 90 capsule 3     No current facility-administered medications on file prior to visit. Allergies:     Allergies   Allergen Reactions    Latex      Added based on information entered during log entry, please review and add

## 2023-04-07 ENCOUNTER — HOSPITAL ENCOUNTER (OUTPATIENT)
Dept: MRI IMAGING | Age: 33
Discharge: HOME | End: 2023-04-07
Payer: COMMERCIAL

## 2023-04-07 DIAGNOSIS — Z98.890 HX OF ANTERIOR CRUCIATE LIGAMENT TEAR RECONSTRUCTION: ICD-10-CM

## 2023-04-07 PROCEDURE — 73721 MRI JNT OF LWR EXTRE W/O DYE: CPT

## 2023-05-16 ENCOUNTER — HOSPITAL ENCOUNTER (OUTPATIENT)
Dept: PHYSICAL THERAPY | Age: 33
Setting detail: THERAPIES SERIES
Discharge: HOME | End: 2023-05-16
Payer: COMMERCIAL

## 2023-05-16 PROCEDURE — 97110 THERAPEUTIC EXERCISES: CPT

## 2023-05-16 PROCEDURE — 97161 PT EVAL LOW COMPLEX 20 MIN: CPT

## 2023-05-16 PROCEDURE — 97140 MANUAL THERAPY 1/> REGIONS: CPT

## 2023-05-16 NOTE — PROGRESS NOTES
[address]  PHYSICAL THERAPY PLAN OF CARE   [address] [place], [address] [facility]         Phone: [phone]  Fax: [phone]    [x] Certification  [] Recertification [x]  Plan of Care  [] Progress Note [] Discharge      Referring Provider: Emilia Gómez MD     From:  Álvaro Guerrero, PT, DPT  Patient: Jaison Alvarez (70 y.o. male) : 1990 Date: 2023  Medical Diagnosis: Hx of anterior cruciate ligament tear reconstruction [Z98.890] Hx of anterior cruciate ligament tear reconstruction Diagnosis: Hx of anterior cruciate ligament tear reconstruction   Treatment Diagnosis: increased right knee pain, decreased pain free right knee extension ROM, decreased knowledge of PT HEP, & decreased ability to perform functional mobility tasks & ADLs    Plan of Care/Certification Expiration Date:     Progress Report Period from:  2023  to 2023    Visits to Date: 1 No Show: 0 Cancelled Appts: 0    OBJECTIVE:   Long Term Goals - Time Frame for Long Term Goals : 4-6 weeks  Goals Current/ Discharge status Status   Long Term Goal 1: The patient will be compliant/independent with PT HEP in order to continue with exercises/stretches upon discharge. LTG 1 Current Status[de-identified] 23: on-going, initiated this date   New   Long Term Goal 2: The patient will report decreased right knee pain </=3/10 consistently in order to improve ability to perform functional mobility tasks LTG 2 Current Status[de-identified] 23: 0-8/10 pain ranges   New   Long Term Goal 3: The patient will have an increase in LEFS score >/=9 points in order to increase functional activity tolerance LTG 3 Current Status[de-identified] 23: 47/80   New   Long Term Goal 4: The patient will demonstrate improved pain free right knee extension AROM = 0* in order to enable patient to perform functional mobility tasks & ADLs with increased ease.  LTG 4 Current Status[de-identified] 23: lacking 3*   New     Body Structures, Functions, Activity Limitations
fixation screw. Correlate with any concern for loosening in this region. Small knee joint effusion. Small radial tear of the inner free edge body segment lateral meniscus. Blunted morphology of the posterior horn medial meniscus and inner free edge  body segment likely from previous meniscal debridement or longstanding  degenerative tear.   Comments: RTD = TBD; PLOF = 100%; CLOF = 50%      Learning/Language: Learning  Does the patient/guardian have any barriers to learning?: No barriers  Will there be a co-learner?: No  What is the preferred language of the patient/guardian?: English  Is an  required?: No  How does the patient/guardian prefer to learn new concepts?: Listening, Reading, Demonstration, Pictures/Videos     Pain Screening    Pain Screening  Patient Currently in Pain: Denies (0-8/10 pain ranges medial; full extension)    Functional Status    Social History:    Social History  Lives With: Alone  Type of Home: House  Home Layout: Two level, Laundry in basement, Work area in basement  Home Access: Stairs to enter with rails  Entrance Stairs - Rails: Both  Entrance Stairs - Number of Steps: 3    Occupation/Interests:   Occupation: Full time employment  Type of Occupation:     Prior Level of Function:   100% Independent        Current Level of Function:   50%      ADL Assistance: Independent  Homemaking Assistance: Independent  Ambulation Assistance: Independent  Transfer Assistance: Independent  Active : Yes  Mode of Transportation: Car    Dominant Hand: : Right    OBJECTIVE EXAMINATION     Review of Systems:  Overall Orientation Status: Within Normal Limits  Patient affect[de-identified] Normal  Follows Commands: Within Functional Limits        Observations:   General Observations  General Observations: Yes  Description: slightly decreased patellar mobility right knee    Left AROM  Right AROM         AROM LLE (degrees)  LLE General AROM: knee flexion = 135*; knee

## 2023-05-25 ENCOUNTER — HOSPITAL ENCOUNTER (OUTPATIENT)
Dept: PHYSICAL THERAPY | Age: 33
Setting detail: THERAPIES SERIES
Discharge: HOME | End: 2023-05-25
Payer: COMMERCIAL

## 2023-05-25 PROCEDURE — 97110 THERAPEUTIC EXERCISES: CPT

## 2023-05-25 ASSESSMENT — PAIN SCALES - GENERAL: PAINLEVEL_OUTOF10: 2

## 2023-05-25 NOTE — PROGRESS NOTES
[address] [facility]  Outpatient Physical Therapy    Treatment Note        Date: 2023  Patient: Yanely Gallego  : 1990   Confirmed: Yes  MRN: [id number]  Referring Provider: Sydnie Smith MD    Medical Diagnosis: Hx of anterior cruciate ligament tear reconstruction [Z98.890]       Treatment Diagnosis: increased right knee pain, decreased pain free right knee extension ROM, decreased knowledge of PT HEP, & decreased ability to perform functional mobility tasks & ADLs    Visit Information:  Insurance: Payor: [employer] [address] / Plan: [id number] [place] [id number] / Product Type: *No Product type* /   PT Visit Information  Onset Date:  (~)  PT Insurance Information: [address]  Total # of Visits Approved: 20  Total # of Visits to Date: 2  No Show: 0  Canceled Appointment: 0  Progress Note Counter: -    Subjective Information:  Subjective: Patient reports feeling \"sore\" after last session. Patient reports working up to 30 reps each exercise, however, believes he over did it.   HEP Compliance:  [x] Good [] Fair [] Poor [] Reports not doing due to:    Pain Screening  Patient Currently in Pain: Yes  Pain Assessment: 0-10  Pain Level: 2    Treatment:  Exercises:  Exercises  Exercise 5: S/L hip*  Exercise 6: prone hip*  Exercise 7: hip IR/ER*  Exercise 8: fire hydrants*  Exercise 9: bridges with RTB, 2 sets x 15 reps x 5 second hold, supine  Exercise 10: lateral lunge on flat surface of BOSU, 2 sets x 15 reps x 5 second hold  Exercise 11: SS with RTB around mid calf, 50' x 2 each lap, 3 laps, no UE support  Exercise 12: MW with RTB around mid calf, 50' x 2 each lap, 3 laps, no UE support  Exercise 13: bike, STAR TRAC, level 3, 5 minutes, seat 4  Exercise 14: hip flexion with RTB using opposite leg for support, 2 sets x 15 reps x 5 second hold each LE  Exercise 20: HEP: continue with current + SS & MW & bridges with band & hip flexion & lateral lunges    *Indicates exercise, modality,

## 2023-06-01 ENCOUNTER — HOSPITAL ENCOUNTER (OUTPATIENT)
Dept: PHYSICAL THERAPY | Age: 33
Setting detail: THERAPIES SERIES
Discharge: HOME | End: 2023-06-01
Payer: COMMERCIAL

## 2023-06-01 PROCEDURE — 97110 THERAPEUTIC EXERCISES: CPT

## 2023-06-01 ASSESSMENT — PAIN DESCRIPTION - DESCRIPTORS: DESCRIPTORS: SORE

## 2023-06-01 ASSESSMENT — PAIN DESCRIPTION - ORIENTATION: ORIENTATION: RIGHT

## 2023-06-01 ASSESSMENT — PAIN SCALES - GENERAL: PAINLEVEL_OUTOF10: 1

## 2023-06-01 ASSESSMENT — PAIN DESCRIPTION - LOCATION: LOCATION: KNEE

## 2023-06-08 ENCOUNTER — HOSPITAL ENCOUNTER (OUTPATIENT)
Dept: PHYSICAL THERAPY | Age: 33
Setting detail: THERAPIES SERIES
Discharge: HOME | End: 2023-06-08
Payer: COMMERCIAL

## 2023-06-08 PROCEDURE — 97110 THERAPEUTIC EXERCISES: CPT

## 2023-06-08 ASSESSMENT — PAIN DESCRIPTION - LOCATION: LOCATION: KNEE

## 2023-06-08 ASSESSMENT — PAIN DESCRIPTION - ORIENTATION: ORIENTATION: RIGHT

## 2023-06-08 ASSESSMENT — PAIN SCALES - GENERAL: PAINLEVEL_OUTOF10: 1

## 2023-06-08 ASSESSMENT — PAIN DESCRIPTION - DESCRIPTORS: DESCRIPTORS: SORE

## 2023-06-08 NOTE — PROGRESS NOTES
[facility] [facility]  Outpatient Physical Therapy    Treatment Note        Date: 2023  Patient: Nhi Carlson  : 1990   Confirmed: Yes  MRN: [id number]  Referring Provider: Yanci Beasley MD    Medical Diagnosis: Hx of anterior cruciate ligament tear reconstruction [Z98.890]       Treatment Diagnosis: increased right knee pain, decreased pain free right knee extension ROM, decreased knowledge of PT HEP, & decreased ability to perform functional mobility tasks & ADLs    Visit Information:  Insurance: Payor: [employer] [address] / Plan: [id number] [place] [id number] / Product Type: *No Product type* /   PT Visit Information  Onset Date:  (~)  PT Insurance Information: [address]  Total # of Visits Approved: 20  Total # of Visits to Date: 4  No Show: 0  Canceled Appointment: 0  Progress Note Counter: -    Subjective Information:  Subjective: Patient reports right knee is feeling 'better.'  Patient reports independence/compliance with PT HEP. Patient reports noticing improved strength when doing functional tasks as well as decreased pain in extension with right knee.   HEP Compliance:  [x] Good [] Fair [] Poor [] Reports not doing due to:    Pain Screening  Patient Currently in Pain: Yes  Pain Assessment: 0-10  Pain Level: 1  Pain Location: Knee  Pain Orientation: Right  Pain Descriptors: Sore    Treatment:  Exercises:  Exercises  Exercise 4: RDLs (toe touch with toe slides back) 2 sets x 15 reps (10# KB)  Exercise 13: bike, STAR TRAC, level 4, 6 minutes, seat 4  Exercise 15: step-ups, 8\" step, 1 set x 20 reps x 2 way, no UE support, leading with RLE  Exercise 16: heel tap downs, 8\" step, 1 set x 20 reps, no UE support, leading with RLE  Exercise 17: single leg squat, RLE, 2 sets x 10 reps x 5 second hold  Exercise 18: single leg bridge, RLE, 2 sets x 10 reps x 5 second hold  Exercise 20: HEP: continue with current + SLS & SLB    *Indicates exercise, modality, or manual techniques to

## 2023-06-20 ENCOUNTER — HOSPITAL ENCOUNTER (OUTPATIENT)
Dept: PHYSICAL THERAPY | Age: 33
Setting detail: THERAPIES SERIES
Discharge: HOME | End: 2023-06-20
Payer: COMMERCIAL

## 2023-06-20 PROCEDURE — 97110 THERAPEUTIC EXERCISES: CPT

## 2023-06-20 ASSESSMENT — PAIN SCALES - GENERAL: PAINLEVEL_OUTOF10: 2

## 2023-06-20 ASSESSMENT — PAIN DESCRIPTION - ORIENTATION: ORIENTATION: RIGHT

## 2023-06-20 ASSESSMENT — PAIN DESCRIPTION - DESCRIPTORS: DESCRIPTORS: SORE

## 2023-06-20 ASSESSMENT — PAIN DESCRIPTION - LOCATION: LOCATION: KNEE

## 2023-06-20 NOTE — PROGRESS NOTES
[address] [facility]  Outpatient Physical Therapy    Treatment Note        Date: 2023  Patient: Raj Kay  : 1990   Confirmed: Yes  MRN: [id number]  Referring Provider: Jovana England MD    Medical Diagnosis: Hx of anterior cruciate ligament tear reconstruction [Z98.890]       Treatment Diagnosis: increased right knee pain, decreased pain free right knee extension ROM, decreased knowledge of PT HEP, & decreased ability to perform functional mobility tasks & ADLs    Visit Information:  Insurance: Payor: [employer] [address] / Plan: [id number] [place] [id number] / Product Type: *No Product type* /   PT Visit Information  Onset Date:  (~)  PT Insurance Information: [address]  Total # of Visits Approved: 20  Total # of Visits to Date: 6  No Show: 0  Canceled Appointment: 0  Progress Note Counter: -    Subjective Information:  Subjective: Pt reports his knee was in a lot pain on  morning and did a lot stretching. Says he had to run to stop his niece from riding into the street and didn't feel too bad. HEP Compliance:  [x] Good [] Fair [] Poor [] Reports not doing due to:    Pain Screening  Patient Currently in Pain: Yes  Pain Assessment: 0-10  Pain Level: 2  Pain Location: Knee  Pain Orientation: Right  Pain Descriptors: Sore    Treatment:  Exercises:  Exercises  Exercise 2: Elliptical x 2 minutes  Exercise 6: prone hip extension x 10 -5s eamon  Exercise 7: hip IR/ER 2 x 10 RTB  Exercise 11: SS with GTB around mid calf, 50' x 2 each lap, 3 laps, no UE support  Exercise 12: MW with GTB around mid calf, 50' x 2 each lap, 3 laps, no UE support  Exercise 15: step-ups, 8\" step, 1 set x 20 reps x 2 way, no UE support, leading with RLE  Exercise 16: Vectors (3 way) 2 x 10  around (forward, lateral, curtsy)   Objective Measures:         LTG 3 Current Status[de-identified] 23: 68/80                Assessment:    Body Structures, Functions, Activity Limitations Requiring Skilled Therapeutic

## 2023-06-29 ENCOUNTER — HOSPITAL ENCOUNTER (OUTPATIENT)
Dept: PHYSICAL THERAPY | Age: 33
Setting detail: THERAPIES SERIES
Discharge: HOME | End: 2023-06-29
Payer: COMMERCIAL

## 2023-06-29 PROCEDURE — 97110 THERAPEUTIC EXERCISES: CPT

## 2023-07-21 RX ORDER — VENLAFAXINE HYDROCHLORIDE 75 MG/1
75 CAPSULE, EXTENDED RELEASE ORAL DAILY
Qty: 30 CAPSULE | Refills: 3 | Status: SHIPPED | OUTPATIENT
Start: 2023-07-21

## 2023-07-21 RX ORDER — VENLAFAXINE HYDROCHLORIDE 37.5 MG/1
37.5 CAPSULE, EXTENDED RELEASE ORAL DAILY
Qty: 30 CAPSULE | Refills: 3 | Status: SHIPPED | OUTPATIENT
Start: 2023-07-21

## 2023-07-24 RX ORDER — VENLAFAXINE HYDROCHLORIDE 37.5 MG/1
CAPSULE, EXTENDED RELEASE ORAL
Qty: 30 CAPSULE | Refills: 3 | Status: SHIPPED | OUTPATIENT
Start: 2023-07-24

## 2023-07-24 RX ORDER — VENLAFAXINE HYDROCHLORIDE 75 MG/1
CAPSULE, EXTENDED RELEASE ORAL
Qty: 30 CAPSULE | Refills: 3 | Status: SHIPPED | OUTPATIENT
Start: 2023-07-24

## 2023-07-27 ENCOUNTER — HOSPITAL ENCOUNTER (OUTPATIENT)
Dept: MRI IMAGING | Age: 33
Discharge: HOME | End: 2023-07-29
Attending: ORTHOPAEDIC SURGERY
Payer: COMMERCIAL

## 2023-07-27 DIAGNOSIS — S83.249A OTHER TEAR OF MEDIAL MENISCUS, CURRENT INJURY, UNSPECIFIED KNEE, INITIAL ENCOUNTER: ICD-10-CM

## 2023-07-27 PROCEDURE — 73721 MRI JNT OF LWR EXTRE W/O DYE: CPT

## 2023-08-17 PROBLEM — J34.3 HYPERTROPHY, NASAL, TURBINATE: Status: ACTIVE | Noted: 2023-08-17

## 2023-08-17 PROBLEM — H90.3 BILATERAL SENSORINEURAL HEARING LOSS: Status: ACTIVE | Noted: 2023-08-17

## 2023-08-17 PROBLEM — S83.411A MCL SPRAIN OF RIGHT KNEE: Status: ACTIVE | Noted: 2023-08-17

## 2023-08-17 PROBLEM — I10 HTN (HYPERTENSION): Status: ACTIVE | Noted: 2023-08-17

## 2023-08-17 PROBLEM — R07.9 CHEST PAIN: Status: ACTIVE | Noted: 2023-08-17

## 2023-08-17 PROBLEM — R00.2 PALPITATIONS: Status: ACTIVE | Noted: 2023-08-17

## 2023-08-17 PROBLEM — J30.9 ALLERGIC RHINITIS: Status: ACTIVE | Noted: 2023-08-17

## 2023-08-17 PROBLEM — H93.8X9 EAR PRESSURE: Status: ACTIVE | Noted: 2023-08-17

## 2023-08-17 PROBLEM — R06.02 SHORTNESS OF BREATH: Status: ACTIVE | Noted: 2023-08-17

## 2023-08-17 PROBLEM — M76.50 PATELLAR TENDONITIS: Status: ACTIVE | Noted: 2023-08-17

## 2023-08-17 PROBLEM — S83.249A MMT (MEDIAL MENISCUS TEAR): Status: ACTIVE | Noted: 2023-08-17

## 2023-08-17 PROBLEM — J34.2 NASAL SEPTAL DEVIATION: Status: ACTIVE | Noted: 2023-08-17

## 2023-08-17 PROBLEM — M23.90 INTERNAL DERANGEMENT OF KNEE: Status: ACTIVE | Noted: 2023-08-17

## 2023-08-17 PROBLEM — S83.519A ACL TEAR: Status: ACTIVE | Noted: 2023-08-17

## 2023-08-17 PROBLEM — R42 DIZZINESS: Status: ACTIVE | Noted: 2023-08-17

## 2023-08-17 PROBLEM — M17.11 OSTEOARTHRITIS OF RIGHT KNEE: Status: ACTIVE | Noted: 2023-08-17

## 2023-08-17 PROBLEM — J34.89 NASAL OBSTRUCTION: Status: ACTIVE | Noted: 2023-08-17

## 2023-08-17 RX ORDER — LORATADINE 10 MG/1
CAPSULE, LIQUID FILLED ORAL
COMMUNITY

## 2023-08-17 RX ORDER — VENLAFAXINE HYDROCHLORIDE 150 MG/1
150 CAPSULE, EXTENDED RELEASE ORAL
COMMUNITY

## 2023-08-17 RX ORDER — OXYCODONE AND ACETAMINOPHEN 5; 325 MG/1; MG/1
1 TABLET ORAL EVERY 6 HOURS
COMMUNITY
Start: 2022-08-01

## 2023-08-17 RX ORDER — LANOLIN ALCOHOL/MO/W.PET/CERES
CREAM (GRAM) TOPICAL
COMMUNITY

## 2023-08-17 RX ORDER — METOPROLOL TARTRATE 25 MG/1
TABLET, FILM COATED ORAL
COMMUNITY

## 2023-08-17 RX ORDER — PREDNISONE 50 MG/1
1 TABLET ORAL DAILY
COMMUNITY
Start: 2022-10-05

## 2023-08-17 RX ORDER — FLUTICASONE PROPIONATE 50 MCG
SPRAY, SUSPENSION (ML) NASAL
COMMUNITY

## 2023-09-21 ENCOUNTER — OFFICE VISIT (OUTPATIENT)
Dept: FAMILY MEDICINE CLINIC | Age: 33
End: 2023-09-21
Payer: COMMERCIAL

## 2023-09-21 VITALS
BODY MASS INDEX: 35.55 KG/M2 | OXYGEN SATURATION: 95 % | SYSTOLIC BLOOD PRESSURE: 128 MMHG | HEART RATE: 85 BPM | RESPIRATION RATE: 16 BRPM | HEIGHT: 74 IN | DIASTOLIC BLOOD PRESSURE: 80 MMHG | WEIGHT: 277 LBS

## 2023-09-21 DIAGNOSIS — F41.9 ANXIETY: Primary | ICD-10-CM

## 2023-09-21 PROCEDURE — 99213 OFFICE O/P EST LOW 20 MIN: CPT | Performed by: FAMILY MEDICINE

## 2023-09-21 ASSESSMENT — ENCOUNTER SYMPTOMS
GASTROINTESTINAL NEGATIVE: 1
ALLERGIC/IMMUNOLOGIC NEGATIVE: 1
RESPIRATORY NEGATIVE: 1
SHORTNESS OF BREATH: 0
EYES NEGATIVE: 1

## 2023-09-21 NOTE — PROGRESS NOTES
please review and add reactions, type, and severity as needed    Pcn [Penicillins] Anaphylaxis    Tessalon [Benzonatate] Anaphylaxis    Amoxicillin Itching     pt finished 10 day course of Amoxicillin and proceeded to have itching to bilateral hands, arms, neck, and top of head without any visible rash     Health Maintenance   Topic Date Due    Hepatitis B vaccine (3 of 3 - 3-dose series) 12/05/2002    Hepatitis C screen  Never done    COVID-19 Vaccine (3 - Pfizer series) 06/24/2021    Flu vaccine (1) 08/01/2023    Depression Screen  03/21/2024    DTaP/Tdap/Td vaccine (2 - Td or Tdap) 05/30/2025    HIV screen  Completed    Hepatitis A vaccine  Aged Out    Hib vaccine  Aged Out    HPV vaccine  Aged Out    Meningococcal (ACWY) vaccine  Aged Out    Pneumococcal 0-64 years Vaccine  Aged Out    Varicella vaccine  Discontinued       Review of Systems     Review of Systems   Constitutional:  Negative for activity change, appetite change, chills, fever and unexpected weight change. HENT: Negative. Eyes: Negative. Respiratory: Negative. Negative for shortness of breath. Cardiovascular: Negative. Negative for chest pain and palpitations. Gastrointestinal: Negative. Endocrine: Negative. Genitourinary: Negative. Musculoskeletal: Negative. Skin: Negative. Allergic/Immunologic: Negative. Neurological: Negative. Hematological: Negative. Psychiatric/Behavioral: Negative. Physical Exam  Vitals:    09/21/23 0800   BP: 128/80   Pulse: 85   Resp: 16   SpO2: 95%   Weight: 277 lb (125.6 kg)   Height: 6' 2\" (1.88 m)       Physical Exam  Constitutional:       Appearance: He is well-developed. HENT:      Right Ear: External ear normal.      Left Ear: External ear normal.   Eyes:      Conjunctiva/sclera: Conjunctivae normal.      Pupils: Pupils are equal, round, and reactive to light. Neck:      Thyroid: No thyromegaly. Cardiovascular:      Rate and Rhythm: Normal rate and regular rhythm.

## 2023-10-17 ENCOUNTER — APPOINTMENT (OUTPATIENT)
Dept: CARDIOLOGY | Facility: CLINIC | Age: 33
End: 2023-10-17
Payer: COMMERCIAL

## 2023-11-16 RX ORDER — VENLAFAXINE HYDROCHLORIDE 37.5 MG/1
37.5 CAPSULE, EXTENDED RELEASE ORAL DAILY
Qty: 30 CAPSULE | Refills: 3 | Status: SHIPPED | OUTPATIENT
Start: 2023-11-16

## 2024-03-13 RX ORDER — VENLAFAXINE HYDROCHLORIDE 75 MG/1
CAPSULE, EXTENDED RELEASE ORAL
Qty: 30 CAPSULE | Refills: 3 | Status: SHIPPED | OUTPATIENT
Start: 2024-03-13

## 2024-03-18 RX ORDER — VENLAFAXINE HYDROCHLORIDE 37.5 MG/1
37.5 CAPSULE, EXTENDED RELEASE ORAL DAILY
Qty: 30 CAPSULE | Refills: 3 | Status: SHIPPED | OUTPATIENT
Start: 2024-03-18

## 2024-07-16 RX ORDER — VENLAFAXINE HYDROCHLORIDE 75 MG/1
75 CAPSULE, EXTENDED RELEASE ORAL DAILY
Qty: 30 CAPSULE | Refills: 0 | Status: SHIPPED | OUTPATIENT
Start: 2024-07-16

## 2024-07-16 RX ORDER — VENLAFAXINE HYDROCHLORIDE 37.5 MG/1
37.5 CAPSULE, EXTENDED RELEASE ORAL DAILY
Qty: 30 CAPSULE | Refills: 0 | Status: SHIPPED | OUTPATIENT
Start: 2024-07-16

## 2024-08-12 ASSESSMENT — PATIENT HEALTH QUESTIONNAIRE - PHQ9
SUM OF ALL RESPONSES TO PHQ QUESTIONS 1-9: 0
SUM OF ALL RESPONSES TO PHQ9 QUESTIONS 1 & 2: 0
2. FEELING DOWN, DEPRESSED OR HOPELESS: NOT AT ALL
SUM OF ALL RESPONSES TO PHQ9 QUESTIONS 1 & 2: 0
1. LITTLE INTEREST OR PLEASURE IN DOING THINGS: NOT AT ALL
1. LITTLE INTEREST OR PLEASURE IN DOING THINGS: NOT AT ALL
2. FEELING DOWN, DEPRESSED OR HOPELESS: NOT AT ALL

## 2024-08-16 ENCOUNTER — OFFICE VISIT (OUTPATIENT)
Dept: FAMILY MEDICINE CLINIC | Age: 34
End: 2024-08-16
Payer: COMMERCIAL

## 2024-08-16 VITALS
RESPIRATION RATE: 17 BRPM | SYSTOLIC BLOOD PRESSURE: 128 MMHG | OXYGEN SATURATION: 98 % | HEART RATE: 77 BPM | HEIGHT: 74 IN | BODY MASS INDEX: 34.39 KG/M2 | DIASTOLIC BLOOD PRESSURE: 76 MMHG | WEIGHT: 268 LBS

## 2024-08-16 DIAGNOSIS — Z13.220 LIPID SCREENING: ICD-10-CM

## 2024-08-16 DIAGNOSIS — Z00.00 ANNUAL PHYSICAL EXAM: Primary | ICD-10-CM

## 2024-08-16 DIAGNOSIS — Z00.00 ANNUAL PHYSICAL EXAM: ICD-10-CM

## 2024-08-16 LAB
ALBUMIN SERPL-MCNC: 4.8 G/DL (ref 3.5–4.6)
ALP SERPL-CCNC: 84 U/L (ref 35–104)
ALT SERPL-CCNC: 33 U/L (ref 0–41)
ANION GAP SERPL CALCULATED.3IONS-SCNC: 13 MEQ/L (ref 9–15)
AST SERPL-CCNC: 32 U/L (ref 0–40)
BASOPHILS # BLD: 0 K/UL (ref 0–0.2)
BASOPHILS NFR BLD: 0.8 %
BILIRUB SERPL-MCNC: 0.6 MG/DL (ref 0.2–0.7)
BUN SERPL-MCNC: 14 MG/DL (ref 6–20)
CALCIUM SERPL-MCNC: 9.8 MG/DL (ref 8.5–9.9)
CHLORIDE SERPL-SCNC: 103 MEQ/L (ref 95–107)
CHOLEST SERPL-MCNC: 161 MG/DL (ref 0–199)
CO2 SERPL-SCNC: 24 MEQ/L (ref 20–31)
CREAT SERPL-MCNC: 0.99 MG/DL (ref 0.7–1.2)
EOSINOPHIL # BLD: 0.2 K/UL (ref 0–0.7)
EOSINOPHIL NFR BLD: 4.9 %
ERYTHROCYTE [DISTWIDTH] IN BLOOD BY AUTOMATED COUNT: 12.1 % (ref 11.5–14.5)
GLOBULIN SER CALC-MCNC: 2.9 G/DL (ref 2.3–3.5)
GLUCOSE SERPL-MCNC: 89 MG/DL (ref 70–99)
HCT VFR BLD AUTO: 46.8 % (ref 42–52)
HDLC SERPL-MCNC: 47 MG/DL (ref 40–59)
HGB BLD-MCNC: 15.9 G/DL (ref 14–18)
LDLC SERPL CALC-MCNC: 99 MG/DL (ref 0–129)
LYMPHOCYTES # BLD: 1.3 K/UL (ref 1–4.8)
LYMPHOCYTES NFR BLD: 27.2 %
MCH RBC QN AUTO: 29 PG (ref 27–31.3)
MCHC RBC AUTO-ENTMCNC: 34 % (ref 33–37)
MCV RBC AUTO: 85.4 FL (ref 79–92.2)
MONOCYTES # BLD: 0.5 K/UL (ref 0.2–0.8)
MONOCYTES NFR BLD: 11 %
NEUTROPHILS # BLD: 2.7 K/UL (ref 1.4–6.5)
NEUTS SEG NFR BLD: 55.5 %
PLATELET # BLD AUTO: 229 K/UL (ref 130–400)
POTASSIUM SERPL-SCNC: 4.2 MEQ/L (ref 3.4–4.9)
PROT SERPL-MCNC: 7.7 G/DL (ref 6.3–8)
RBC # BLD AUTO: 5.48 M/UL (ref 4.7–6.1)
SODIUM SERPL-SCNC: 140 MEQ/L (ref 135–144)
TRIGL SERPL-MCNC: 74 MG/DL (ref 0–150)
WBC # BLD AUTO: 4.9 K/UL (ref 4.8–10.8)

## 2024-08-16 PROCEDURE — 99395 PREV VISIT EST AGE 18-39: CPT | Performed by: FAMILY MEDICINE

## 2024-08-16 RX ORDER — VENLAFAXINE HYDROCHLORIDE 37.5 MG/1
37.5 CAPSULE, EXTENDED RELEASE ORAL DAILY
Qty: 30 CAPSULE | Refills: 11 | Status: SHIPPED | OUTPATIENT
Start: 2024-08-16

## 2024-08-16 RX ORDER — VENLAFAXINE HYDROCHLORIDE 75 MG/1
75 CAPSULE, EXTENDED RELEASE ORAL DAILY
Qty: 30 CAPSULE | Refills: 11 | Status: SHIPPED | OUTPATIENT
Start: 2024-08-16

## 2024-08-16 RX ORDER — NYSTATIN 10B UNIT
1 POWDER (EA) MISCELLANEOUS 2 TIMES DAILY
Qty: 3 EACH | Refills: 3 | Status: SHIPPED | OUTPATIENT
Start: 2024-08-16

## 2024-08-16 SDOH — ECONOMIC STABILITY: FOOD INSECURITY: WITHIN THE PAST 12 MONTHS, YOU WORRIED THAT YOUR FOOD WOULD RUN OUT BEFORE YOU GOT MONEY TO BUY MORE.: NEVER TRUE

## 2024-08-16 SDOH — ECONOMIC STABILITY: FOOD INSECURITY: WITHIN THE PAST 12 MONTHS, THE FOOD YOU BOUGHT JUST DIDN'T LAST AND YOU DIDN'T HAVE MONEY TO GET MORE.: NEVER TRUE

## 2024-08-16 SDOH — ECONOMIC STABILITY: INCOME INSECURITY: HOW HARD IS IT FOR YOU TO PAY FOR THE VERY BASICS LIKE FOOD, HOUSING, MEDICAL CARE, AND HEATING?: NOT HARD AT ALL

## 2024-08-16 ASSESSMENT — ENCOUNTER SYMPTOMS
ALLERGIC/IMMUNOLOGIC NEGATIVE: 1
GASTROINTESTINAL NEGATIVE: 1
RESPIRATORY NEGATIVE: 1
EYES NEGATIVE: 1
SHORTNESS OF BREATH: 0

## 2024-08-16 NOTE — PROGRESS NOTES
Patient is seen in follow up for   Chief Complaint   Patient presents with    Check-Up    Pruritis     Had some groin itching gone now but just wants to discuss     HPIhere for yearly physical  feeling well.    Past Medical History:   Diagnosis Date    Heart palpitations     PONV (postoperative nausea and vomiting)      Patient Active Problem List    Diagnosis Date Noted    Sleep apnea      Past Surgical History:   Procedure Laterality Date    ANTERIOR CRUCIATE LIGAMENT REPAIR Right 10/2/2020    RIGHT: ARTHROSCOPY KNEE ANTERIOR CRUCIATE LIGAMENT RECONSTRUCTION AND  MEDIAL + LATERAL MENISCENTOMY performed by Isacc Leary MD at [facility]    KNEE ARTHROSCOPY Right 2022    RIGHT KNEE ARTHROSCOPY LATERAL AND MEDIAL MENISCECTOMY, ARTHROSCOPY KNEE MEDIAL AND LATERAL MENISCECTOMY performed by Isacc Leary MD at [facility] OR    TONSILLECTOMY AND ADENOIDECTOMY      TYMPANOSTOMY TUBE PLACEMENT      WISDOM TOOTH EXTRACTION       Family History   Problem Relation Age of Onset    High Blood Pressure Father     Cancer Other         GM     Social History     Socioeconomic History    Marital status: Single     Spouse name: None    Number of children: None    Years of education: None    Highest education level: None   Tobacco Use    Smoking status: Former     Current packs/day: 0.00     Average packs/day: 0.3 packs/day for 5.0 years (1.3 ttl pk-yrs)     Types: Cigarettes     Start date: 2010     Quit date: 2015     Years since quittin.8     Passive exposure: Past    Smokeless tobacco: Never   Vaping Use    Vaping status: Every Day    Start date: 2014    Substances: Nicotine    Devices: Refillable tank    Passive vaping exposure: Yes   Substance and Sexual Activity    Alcohol use: No    Drug use: No     Social Determinants of Health     Financial Resource Strain: Low Risk  (2024)    Overall Financial Resource Strain (CARDIA)     Difficulty of Paying Living Expenses: Not hard at all   Food Insecurity: No

## 2025-08-04 RX ORDER — VENLAFAXINE HYDROCHLORIDE 75 MG/1
75 CAPSULE, EXTENDED RELEASE ORAL DAILY
Qty: 30 CAPSULE | Refills: 0 | Status: SHIPPED | OUTPATIENT
Start: 2025-08-04

## 2025-08-04 RX ORDER — VENLAFAXINE HYDROCHLORIDE 37.5 MG/1
37.5 CAPSULE, EXTENDED RELEASE ORAL DAILY
Qty: 30 CAPSULE | Refills: 0 | Status: SHIPPED | OUTPATIENT
Start: 2025-08-04

## (undated) DEVICE — APPLICATOR MEDICATED 26 CC SOLUTION HI LT ORNG CHLORAPREP

## (undated) DEVICE — KIT INSTR TRANSTIBIAL CRUCE W/O SAW BLDE DISP

## (undated) DEVICE — GLOVE ORANGE PI 8   MSG9080

## (undated) DEVICE — COVER,TABLE,44X90,STERILE: Brand: MEDLINE

## (undated) DEVICE — PACK ARTHRO III ST SIRUS

## (undated) DEVICE — KNEE ARTHROSCOPY II-LF: Brand: MEDLINE INDUSTRIES, INC.

## (undated) DEVICE — TUBING, SUCTION, 1/4" X 10', STRAIGHT: Brand: MEDLINE

## (undated) DEVICE — ABSORBENT ROLL ECODRI-SAFE

## (undated) DEVICE — 4-PORT MANIFOLD: Brand: NEPTUNE 2

## (undated) DEVICE — UNDERCAST PADDING: Brand: DEROYAL

## (undated) DEVICE — [TOMCAT CUTTER, ARTHROSCOPIC SHAVER BLADE,  DO NOT RESTERILIZE,  DO NOT USE IF PACKAGE IS DAMAGED,  KEEP DRY,  KEEP AWAY FROM SUNLIGHT]: Brand: FORMULA

## (undated) DEVICE — GOWN,AURORA,NONREINFORCED,LARGE: Brand: MEDLINE

## (undated) DEVICE — YANKAUER,SMOOTH HANDLE,HIGH CAPACITY: Brand: MEDLINE INDUSTRIES, INC.

## (undated) DEVICE — PENCIL SMOKE EVAC PUSH BUTTON COATED

## (undated) DEVICE — 3M™ STERI-DRAPE™ U-DRAPE 1015: Brand: STERI-DRAPE™

## (undated) DEVICE — CONNECTOR,T,STERILE: Brand: MEDLINE

## (undated) DEVICE — 4.5 MM CANNULA AND OBTURATOR,                                    CONICAL TIP, DISTAL HOLES

## (undated) DEVICE — INTENDED FOR TISSUE SEPARATION, AND OTHER PROCEDURES THAT REQUIRE A SHARP SURGICAL BLADE TO PUNCTURE OR CUT.: Brand: BARD-PARKER ® CARBON RIB-BACK BLADES

## (undated) DEVICE — NEEDLE SPNL 18GA L3.5IN W/ QNCKE SHARPER BVL DURA CLICK

## (undated) DEVICE — ELECTRODE PT RET AD L9FT HI MOIST COND ADH HYDRGEL CORDED

## (undated) DEVICE — SET DRL PIN AND GRFT PASS WIRE TRANSFIX II

## (undated) DEVICE — LABEL MED MINI W/ MARKER

## (undated) DEVICE — [RESECTOR CUTTER, ARTHROSCOPIC SHAVER BLADE,  DO NOT RESTERILIZE,  DO NOT USE IF PACKAGE IS DAMAGED,  KEEP DRY,  KEEP AWAY FROM SUNLIGHT]: Brand: FORMULA

## (undated) DEVICE — SUTURE VCRL SZ 0 L36IN ABSRB UD L36MM CT-1 1/2 CIR J946H

## (undated) DEVICE — SPONGE,LAP,18"X18",DLX,XR,ST,5/PK,40/PK: Brand: MEDLINE

## (undated) DEVICE — 3M™ STERI-STRIP™ REINFORCED ADHESIVE SKIN CLOSURES, R1547, 1/2 IN X 4 IN (12 MM X 100 MM), 6 STRIPS/ENVELOPE: Brand: 3M™ STERI-STRIP™

## (undated) DEVICE — BASIC SINGLE BASIN-LF: Brand: MEDLINE INDUSTRIES, INC.

## (undated) DEVICE — GOWN,SIRUS,POLYRNF,BRTHSLV,XLN/XL,20/CS: Brand: MEDLINE

## (undated) DEVICE — PAD,ABDOMINAL,8"X10",ST,LF: Brand: MEDLINE

## (undated) DEVICE — COUNTER NDL 40 COUNT HLD 70 FOAM BLK ADH W/ MAG

## (undated) DEVICE — SUTURE ETHLN SZ 3-0 L18IN NONABSORBABLE BLK PS-2 L19MM 3/8 1669H

## (undated) DEVICE — PADDING CAST W6INXL4YD RAYON UNDERCAST SOF-ROL

## (undated) DEVICE — COVER LT HNDL BLU PLAS

## (undated) DEVICE — SUTURE FIBERLOOP SZ 2-0 L20IN NONABSORBABLE BLU STR NDL AR7234

## (undated) DEVICE — GLOVE ORTHO 7 1/2   MSG9475

## (undated) DEVICE — [AGGRESSIVE PLUS CUTTER, ARTHROSCOPIC SHAVER BLADE,  DO NOT RESTERILIZE,  DO NOT USE IF PACKAGE IS DAMAGED,  KEEP DRY,  KEEP AWAY FROM SUNLIGHT]: Brand: FORMULA

## (undated) DEVICE — 2000CC GUARDIAN II: Brand: GUARDIAN

## (undated) DEVICE — DRAPE EQUIP TRNSPRT CONTAINMENT FOR BK TAB

## (undated) DEVICE — MAT FLR SURG QUICKWICK 28X54 IN DISP

## (undated) DEVICE — SPLINT KNEE UNIV FOR LESS THAN 36IN L20IN FOAM LAM E CNTCT

## (undated) DEVICE — SUTURE MCRYL SZ 4-0 L27IN ABSRB UD L19MM PS-2 1/2 CIR PRIM Y426H

## (undated) DEVICE — BANDAGE COMPR W4INXL5YD BGE HI E W/ REM CLP SURE-WRAP

## (undated) DEVICE — SYRINGE MED 30ML STD CLR PLAS LUERLOCK TIP N CTRL DISP

## (undated) DEVICE — BANDAGE,ELASTIC,ESMARK,STERILE,6"X9',LF: Brand: MEDLINE

## (undated) DEVICE — TUBING PMP L8FT LNG W/ CONN FOR AR-6400 REDEUCE

## (undated) DEVICE — MARKER SURG SKIN GENTIAN VLT REG TIP W/ 6IN RUL

## (undated) DEVICE — CHLORAPREP 26ML ORANGE

## (undated) DEVICE — TUBE IRRIG L8IN LNG PT W/ CONN FOR PMP SYS REDEUCE

## (undated) DEVICE — MEDI-VAC NON-CONDUCTIVE SUCTION TUBING: Brand: CARDINAL HEALTH

## (undated) DEVICE — SUTURE VCRL SZ 2-0 L36IN ABSRB UD L36MM CT-1 1/2 CIR J945H

## (undated) DEVICE — SHEET,DRAPE,53X77,STERILE: Brand: MEDLINE

## (undated) DEVICE — SUTURE FIBERWIRE SZ 2 W/ TAPERED NEEDLE BLUE L38IN NONABSORB BLU L26.5MM 1/2 CIRCLE AR7200

## (undated) DEVICE — GLOVE ORANGE PI 7 1/2   MSG9075

## (undated) DEVICE — SPONGE GZ W4XL4IN COT 12 PLY TYP VII WVN C FLD DSGN

## (undated) DEVICE — ZIMMER® STERILE DISPOSABLE TOURNIQUET CUFF, DUAL PORT, SINGLE BLADDER, 34 IN. (86 CM)